# Patient Record
Sex: FEMALE | Race: WHITE | Employment: OTHER | URBAN - METROPOLITAN AREA
[De-identification: names, ages, dates, MRNs, and addresses within clinical notes are randomized per-mention and may not be internally consistent; named-entity substitution may affect disease eponyms.]

---

## 2018-01-25 ENCOUNTER — TELEPHONE (OUTPATIENT)
Dept: GASTROENTEROLOGY | Facility: CLINIC | Age: 38
End: 2018-01-25

## 2018-04-10 ENCOUNTER — TELEPHONE (OUTPATIENT)
Dept: GASTROENTEROLOGY | Facility: CLINIC | Age: 38
End: 2018-04-10

## 2018-04-11 ENCOUNTER — OFFICE VISIT (OUTPATIENT)
Dept: GASTROENTEROLOGY | Facility: CLINIC | Age: 38
End: 2018-04-11
Payer: MEDICARE

## 2018-04-11 VITALS
WEIGHT: 203.2 LBS | TEMPERATURE: 97.7 F | HEIGHT: 64 IN | BODY MASS INDEX: 34.69 KG/M2 | HEART RATE: 90 BPM | DIASTOLIC BLOOD PRESSURE: 76 MMHG | SYSTOLIC BLOOD PRESSURE: 116 MMHG | OXYGEN SATURATION: 97 %

## 2018-04-11 DIAGNOSIS — E03.9 HYPOTHYROIDISM, UNSPECIFIED TYPE: Primary | ICD-10-CM

## 2018-04-11 DIAGNOSIS — E03.9 HYPOTHYROIDISM, UNSPECIFIED TYPE: ICD-10-CM

## 2018-04-11 DIAGNOSIS — K59.03 DRUG-INDUCED CONSTIPATION: ICD-10-CM

## 2018-04-11 LAB
T4 FREE SERPL-MCNC: 1.29 NG/DL (ref 0.76–1.46)
TSH SERPL DL<=0.005 MIU/L-ACNC: 0.23 MU/L (ref 0.4–4)

## 2018-04-11 RX ORDER — POLYETHYLENE GLYCOL 3350 17 G/17G
1 POWDER, FOR SOLUTION ORAL DAILY
Qty: 510 G | Refills: 1 | Status: SHIPPED | OUTPATIENT
Start: 2018-04-11 | End: 2018-07-23

## 2018-04-11 ASSESSMENT — PAIN SCALES - GENERAL: PAINLEVEL: NO PAIN (0)
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
## 2018-04-11 NOTE — PATIENT INSTRUCTIONS
It was a pleasure taking care of you today.  I've included a brief summary of our discussion and care plan from today's visit below.  Please review this information with your primary care provider.  ______________________________________________________________________    My recommendations are summarized as follows:    -- Recommend a trial of Miralax.  This is not a stimulant laxative and is safe to take on a daily basis.  Try 3 cap(s) every day.  You can titrate this dose (increase or decrease) based on stool frequency and consistency. Do not stop this, just decrease the dose if you are experiencing loose stools.  -- Recommend soluble fiber supplementation on a daily basis (Metamucil, citrucel or benefiber).  Start with 1 tablespoon per day and if tolerated, may increase up to 2-3 tablespoons per day.  You may experience some bloating with initiation of fiber supplementation that will improve over the first month.  A good fiber trial to evaluate the effect is 3-6 months.      Return to GI Clinic in 3 months to review your progress.    ______________________________________________________________________    Who do I call with any questions after my visit?  Please be in touch if there are any further questions that arise following today's visit.  There are multiple ways to contact your gastroenterology care team.        During business hours, you may reach a Gastroenterology nurse at [phone], option 3.       To schedule or reschedule an appointment, please call [phone].       You can always send a secure message through [employer].  [employer] messages are answered by your nurse or doctor typically within 24 hours.  Please allow extra time on weekends and holidays.        For urgent/emergent questions after business hours, you may reach the on-call GI Fellow by contacting the [facility] at [phone].     How will I get the results of any tests ordered?    You will receive all of your  results.  If you have signed up for [employer], any tests ordered at your visit will be available to you after your physician reviews them.  Typically this takes 1-2 weeks.  If there are urgent results that require a change in your care plan, your physician or nurse will call you to discuss the next steps.      What is Privcaphart?  [employer] is a secure way for you to access all of your healthcare records from the [facility].  It is a web based computer program, so you can sign on to it from any location.  It also allows you to send secure messages to your care team.  I recommend signing up for [employer] access if you have not already done so and are comfortable with using a computer.      How to I schedule a follow-up visit?  If you did not schedule a follow-up visit today, please call [phone] to schedule a follow-up office visit.        Sincerely,    Aubrey Holden PA-C  [facility]  Division of Gastroenterology

## 2018-04-11 NOTE — MR AVS SNAPSHOT
After Visit Summary   4/11/2018    Jonathon Broussard    MRN: [id number]           Patient Information     Date Of Birth          1980        Visit Information        Provider Department      4/11/2018 12:00 PM Aubrey Holden PA-C [facility]        Today's Diagnoses     Hypothyroidism, unspecified type    -  1    Drug-induced constipation          Care Instructions    It was a pleasure taking care of you today.  I've included a brief summary of our discussion and care plan from today's visit below.  Please review this information with your primary care provider.  ______________________________________________________________________    My recommendations are summarized as follows:    -- Recommend a trial of Miralax.  This is not a stimulant laxative and is safe to take on a daily basis.  Try 3 cap(s) every day.  You can titrate this dose (increase or decrease) based on stool frequency and consistency. Do not stop this, just decrease the dose if you are experiencing loose stools.  -- Recommend soluble fiber supplementation on a daily basis (Metamucil, citrucel or benefiber).  Start with 1 tablespoon per day and if tolerated, may increase up to 2-3 tablespoons per day.  You may experience some bloating with initiation of fiber supplementation that will improve over the first month.  A good fiber trial to evaluate the effect is 3-6 months.      Return to GI Clinic in 3 months to review your progress.    ______________________________________________________________________    Who do I call with any questions after my visit?  Please be in touch if there are any further questions that arise following today's visit.  There are multiple ways to contact your gastroenterology care team.        During business hours, you may reach a Gastroenterology nurse at [phone], option 3.       To schedule or reschedule an appointment, please call [phone].       You can always  send a secure message through [employer].  [employer] messages are answered by your nurse or doctor typically within 24 hours.  Please allow extra time on weekends and holidays.        For urgent/emergent questions after business hours, you may reach the on-call GI Fellow by contacting the [facility]  at [phone].     How will I get the results of any tests ordered?    You will receive all of your results.  If you have signed up for [employer], any tests ordered at your visit will be available to you after your physician reviews them.  Typically this takes 1-2 weeks.  If there are urgent results that require a change in your care plan, your physician or nurse will call you to discuss the next steps.      What is [employer]?  [employer] is a secure way for you to access all of your healthcare records from the [facility].  It is a web based computer program, so you can sign on to it from any location.  It also allows you to send secure messages to your care team.  I recommend signing up for [employer] access if you have not already done so and are comfortable with using a computer.      How to I schedule a follow-up visit?  If you did not schedule a follow-up visit today, please call [phone] to schedule a follow-up office visit.        Sincerely,    Aubrey Holden PA-C  [facility]  Division of Gastroenterology                Follow-ups after your visit        Future tests that were ordered for you today     Open Future Orders        Priority Expected Expires Ordered    TSH with free T4 reflex Routine 4/11/2018 5/11/2018 4/11/2018            Who to contact     Please call your clinic at [phone] to:    Ask questions about your health    Make or cancel appointments    Discuss your medicines    Learn about your test results    Speak to your doctor            Additional Information About Your Visit        [employer] Information     [employer] is an electronic gateway that provides easy,  "online access to your medical records. With [employer], you can request a clinic appointment, read your test results, renew a prescription or communicate with your care team.     To sign up for [employer] visit the website at [email or web address]   You will be asked to enter the access code listed below, as well as some personal information. Please follow the directions to create your username and password.     Your access code is: [email or web address]  Expires: 4/15/2018  7:30 AM     Your access code will  in 90 days. If you need help or a new code, please contact your [facility] or call [phone] for assistance.        Care EveryWhere ID     This is your Care EveryWhere ID. This could be used by other organizations to access your [facility] medical records  [id number]        Your Vitals Were     Pulse Temperature Height Pulse Oximetry BMI (Body Mass Index)       90 97.7  F (36.5  C) 1.626 m (5' 4\") 97% 34.88 kg/m2        Blood Pressure from Last 3 Encounters:   18 116/76   06/05/15 112/81   06/02/15 132/81    Weight from Last 3 Encounters:   18 92.2 kg (203 lb 3.2 oz)   06/05/15 132.2 kg (291 lb 7.2 oz)   06/02/15 133.1 kg (293 lb 6.4 oz)                 Today's Medication Changes          These changes are accurate as of 18 12:32 PM.  If you have any questions, ask your nurse or doctor.               Start taking these medicines.        Dose/Directions    polyethylene glycol powder   Commonly known as:  MIRALAX   Used for:  Drug-induced constipation   Started by:  Aubrey Holden PA-C        Dose:  1 capful   Take 17 g (1 capful) by mouth daily   Quantity:  510 g   Refills:  1            Where to get your medicines      These medications were sent to [facility] - [place], [place] - [address]  [address], [place] [address]     Phone:  [phone]     polyethylene glycol powder                Primary Care Provider " Office Phone # Fax #    Eugenio Renny Levy PA-C [phone]       [facility] [address]  [place] [address]        Equal Access to Services     TYSON EUGENE : Hadii nathalie ku jareno Sojordiali, waaxda luqadaha, qaybta kaalmada adeegyada, tish alanizn josémichelle mendez viry mcdonald. So [facility] [phone].    ATENCIÓN: Si habla español, tiene a guevara disposición servicios gratuitos de asistencia lingüística. Llame al [phone].    We comply with applicable federal civil rights laws and [place] laws. We do not discriminate on the basis of race, color, national origin, age, disability, sex, sexual orientation, or gender identity.            Thank you!     Thank you for choosing [facility]  for your care. Our goal is always to provide you with excellent care. Hearing back from our patients is one way we can continue to improve our services. Please take a few minutes to complete the written survey that you may receive in the mail after your visit with us. Thank you!             Your Updated Medication List - Protect others around you: Learn how to safely use, store and throw away your medicines at www.disposemymeds.org.          This list is accurate as of 4/11/18 12:32 PM.  Always use your most recent med list.                   Brand Name Dispense Instructions for use Diagnosis    acetaminophen 500 MG tablet    TYLENOL     2 tablets po TID PRN        AMPHETAMINE SULFATE PO      Take 50 mg by mouth daily        BUSPIRONE HCL PO      Take 30 mg by mouth daily        CERTA-KONSTANTIN PO           DOXEPIN HCL PO      Take 25 mg by mouth        FLEXERIL 10 MG tablet   Generic drug:  cyclobenzaprine     20    ONE 3 TIMES DAILY prn muscle spasm, no driving or drinking on this medication    Arthritis chronic, rheumatoid, juvenile       GABAPENTIN PO      Take 1,200 mg by mouth 2 times daily Two tablets twice daily        INVEGA PO      Take 9 mg by mouth         LEVOTHYROXINE SODIUM PO      Take 112 mcg by mouth        LITHIUM CARBONATE PO      Take 1,200 mg by mouth At Bedtime        NALTREXONE HCL PO      Take 50 mg by mouth        omega 3 1000 MG Caps      Take 2 capsules by mouth 2 times daily        PANTOPRAZOLE SODIUM PO      Take 40 mg by mouth every morning (before breakfast)        polyethylene glycol powder    MIRALAX    510 g    Take 17 g (1 capful) by mouth daily    Drug-induced constipation       PROPRANOLOL HCL PO      Take 80 mg by mouth 2 times daily        VALTREX 500 MG tablet   Generic drug:  valACYclovir     60    2 TABLETS DAILY    Herpes simplex without mention of complication       VENLAFAXINE HCL PO      Take 375 mg by mouth 2 times daily ER        VITAMIN D (CHOLECALCIFEROL) PO      Take 2,000 Units by mouth daily
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
## 2018-04-11 NOTE — NURSING NOTE
"No chief complaint on file.      Vitals:    04/11/18 1206   BP: 116/76   BP Location: Left arm   Patient Position: Chair   Cuff Size: Adult Regular   Pulse: 90   Temp: 97.7  F (36.5  C)   SpO2: 97%   Weight: 203 lb 3.2 oz   Height: 5' 4\"       Body mass index is 34.88 kg/(m^2).    Angela Romero                          "

## 2018-04-12 ENCOUNTER — TELEPHONE (OUTPATIENT)
Dept: GASTROENTEROLOGY | Facility: CLINIC | Age: 38
End: 2018-04-12

## 2018-04-12 RX ORDER — POLYETHYLENE GLYCOL 3350 17 G/17G
1 POWDER, FOR SOLUTION ORAL 3 TIMES DAILY
Qty: 510 G | Refills: 1 | Status: SHIPPED | OUTPATIENT
Start: 2018-04-12 | End: 2018-12-05 | Stop reason: ALTCHOICE

## 2018-04-12 NOTE — TELEPHONE ENCOUNTER
Nurse calling from [facility] in [place]. State Rx for Miralax was sent for once daily, but pt is saying needed TID. Confirmed that KRAIG Casillas intended for Rx to be TID per her note. Nurse states he cannot take verbal order and will need a new Rx sent for TID. Yoselyn WILSON LPN

## 2018-07-17 ENCOUNTER — TELEPHONE (OUTPATIENT)
Dept: GASTROENTEROLOGY | Facility: CLINIC | Age: 38
End: 2018-07-17

## 2018-07-18 ENCOUNTER — OFFICE VISIT (OUTPATIENT)
Dept: GASTROENTEROLOGY | Facility: CLINIC | Age: 38
End: 2018-07-18
Payer: MEDICARE

## 2018-07-18 VITALS
SYSTOLIC BLOOD PRESSURE: 112 MMHG | BODY MASS INDEX: 33.59 KG/M2 | HEIGHT: 65 IN | OXYGEN SATURATION: 95 % | WEIGHT: 201.6 LBS | TEMPERATURE: 97.6 F | RESPIRATION RATE: 16 BRPM | DIASTOLIC BLOOD PRESSURE: 86 MMHG | HEART RATE: 99 BPM

## 2018-07-18 DIAGNOSIS — K59.03 DRUG-INDUCED CONSTIPATION: Primary | ICD-10-CM

## 2018-07-18 RX ORDER — OXYBUTYNIN CHLORIDE 10 MG/1
TABLET, EXTENDED RELEASE ORAL
Refills: 99 | COMMUNITY
Start: 2018-07-06

## 2018-07-18 RX ORDER — TRAZODONE HYDROCHLORIDE 50 MG/1
TABLET, FILM COATED ORAL
Refills: 4 | COMMUNITY
Start: 2018-07-03 | End: 2020-02-05

## 2018-07-18 RX ORDER — ATOMOXETINE 100 MG/1
CAPSULE ORAL
COMMUNITY
Start: 2018-04-12

## 2018-07-18 RX ORDER — NICOTINE POLACRILEX 2 MG/1
GUM, CHEWING ORAL
Refills: 16 | COMMUNITY
Start: 2018-07-17

## 2018-07-18 RX ORDER — CLOZAPINE 100 MG/1
TABLET ORAL
COMMUNITY
Start: 2018-07-05 | End: 2019-03-05

## 2018-07-18 RX ORDER — LACTOBACILLUS ACIDOPHILUS
CAPSULE ORAL
Refills: 11 | COMMUNITY
Start: 2018-06-27 | End: 2019-03-05

## 2018-07-18 RX ORDER — DIPHENHYDRAMINE HCL 50 MG/1
CAPSULE ORAL
Refills: 4 | COMMUNITY
Start: 2018-04-12 | End: 2020-02-05

## 2018-07-18 RX ORDER — CARVEDILOL 12.5 MG/1
TABLET ORAL
Refills: 11 | COMMUNITY
Start: 2018-07-17

## 2018-07-18 RX ORDER — CARBOXYMETHYLCELLULOSE SODIUM 5 MG/ML
1 SOLUTION/ DROPS OPHTHALMIC
COMMUNITY
Start: 2018-04-26 | End: 2020-02-05

## 2018-07-18 RX ORDER — DIAZEPAM 10 MG
TABLET ORAL
Refills: 4 | COMMUNITY
Start: 2018-07-11

## 2018-07-18 RX ORDER — OLOPATADINE HYDROCHLORIDE 1 MG/ML
SOLUTION/ DROPS OPHTHALMIC
Refills: 5 | COMMUNITY
Start: 2018-07-09

## 2018-07-18 RX ORDER — LISINOPRIL 5 MG/1
TABLET ORAL
Refills: 11 | COMMUNITY
Start: 2018-06-27

## 2018-07-18 RX ORDER — CLOZAPINE 100 MG/1
TABLET ORAL
COMMUNITY
Start: 2018-04-12

## 2018-07-18 RX ORDER — DOCUSATE SODIUM 100 MG/1
CAPSULE, LIQUID FILLED ORAL
Refills: 11 | COMMUNITY
Start: 2018-04-03 | End: 2020-02-05

## 2018-07-18 RX ORDER — LORAZEPAM 2 MG/1
TABLET ORAL
Refills: 4 | COMMUNITY
Start: 2018-01-30

## 2018-07-18 RX ORDER — AMLODIPINE BESYLATE 10 MG/1
TABLET ORAL
Refills: 11 | COMMUNITY
Start: 2018-07-17

## 2018-07-18 RX ORDER — CARBOXYMETHYLCELLULOSE SODIUM 0.5 G/100ML
SOLUTION/ DROPS OPHTHALMIC
Refills: 11 | COMMUNITY
Start: 2018-07-09

## 2018-07-18 RX ORDER — QUETIAPINE FUMARATE 25 MG/1
TABLET, FILM COATED ORAL
Refills: 4 | COMMUNITY
Start: 2018-07-06 | End: 2020-02-05

## 2018-07-18 RX ORDER — BENZTROPINE MESYLATE 0.5 MG/1
0.5 TABLET ORAL
COMMUNITY
Start: 2018-04-12 | End: 2019-03-05

## 2018-07-18 RX ORDER — ATOMOXETINE 25 MG/1
CAPSULE ORAL
COMMUNITY
Start: 2018-04-12

## 2018-07-18 ASSESSMENT — PAIN SCALES - GENERAL: PAINLEVEL: NO PAIN (0)

## 2018-07-18 NOTE — PATIENT INSTRUCTIONS
It was a pleasure taking care of you today.  I've included a brief summary of our discussion and care plan from today's visit below.  Please review this information with your primary care provider.  ______________________________________________________________________    My recommendations are summarized as follows:    --  stool kit today and submit sample as soon as possible  --  ONE Miralax bottle (510 g) and TWO 32 once Gatorade (64 ounces). Mix the two. Drink mixture over the course of 3-4 hours.  You should experience loose bowel movements that are watery in consistency when complete.  -- The day after the cleanout, immediately resume Miralax 2 caps full daily   -- This is not a stimulant laxative and is safe to take on a daily basis.  You can titrate this dose (increase or decrease) based on stool frequency and consistency.     -- Once you have moved to twice daily dosing of Miralax then add in the soluble fiber supplementation.  -- Recommend soluble fiber supplementation on a daily basis (Metamucil, citrucel or benefiber).  Start with 1 tablespoon per day and if tolerated, may increase up to 2-3 tablespoons per day.  You may experience some bloating with initiation of fiber supplementation that will improve over the first month.  A good fiber trial to evaluate the effect is 3-6 months.    -- Can stop Colace (continue daily Miralax)  -- Recommend your primary adjust your synthroid (thyroid medication)    Return to GI Clinic in 3 months to review your progress.    ______________________________________________________________________    Who do I call with any questions after my visit?  Please be in touch if there are any further questions that arise following today's visit.  There are multiple ways to contact your gastroenterology care team.        During business hours, you may reach a Gastroenterology nurse at [phone], option 3.       To schedule or reschedule an appointment, please call  [phone].       You can always send a secure message through codebender.  codebender messages are answered by your nurse or doctor typically within 24 hours.  Please allow extra time on weekends and holidays.        For urgent/emergent questions after business hours, you may reach the on-call GI Fellow by contacting the [facility]  at [phone].     How will I get the results of any tests ordered?    You will receive all of your results.  If you have signed up for [facility], any tests ordered at your visit will be available to you after your physician reviews them.  Typically this takes 1-2 weeks.  If there are urgent results that require a change in your care plan, your physician or nurse will call you to discuss the next steps.      What is codebender?  codebender is a secure way for you to access all of your healthcare records from the [facility].  It is a web based computer program, so you can sign on to it from any location.  It also allows you to send secure messages to your care team.  I recommend signing up for codebender access if you have not already done so and are comfortable with using a computer.      How to I schedule a follow-up visit?  If you did not schedule a follow-up visit today, please call [phone] to schedule a follow-up office visit.        Sincerely,    Aubrey Holden PA-C  [facility]  Division of Gastroenterology
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
## 2018-07-18 NOTE — NURSING NOTE
"Chief Complaint   Patient presents with     Gastrointestinal Problem     Abdominal pain and Diarrhea       Vitals:    07/18/18 1138   BP: 112/86   BP Location: Left arm   Patient Position: Sitting   Cuff Size: Adult Large   Pulse: 99   Resp: 16   Temp: 97.6  F (36.4  C)   TempSrc: Oral   SpO2: 95%   Weight: 91.4 kg (201 lb 9.6 oz)   Height: 1.65 m (5' 4.96\")       Body mass index is 33.59 kg/(m^2).      Shirley Marcum LPN                          "
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
## 2018-07-18 NOTE — MR AVS SNAPSHOT
After Visit Summary   7/18/2018    Jonathon Broussard    MRN: [id number]           Patient Information     Date Of Birth          1980        Visit Information        Provider Department      7/18/2018 12:20 PM Aubrey Holden PA-C [facility]        Care Instructions    It was a pleasure taking care of you today.  I've included a brief summary of our discussion and care plan from today's visit below.  Please review this information with your primary care provider.  ______________________________________________________________________    My recommendations are summarized as follows:    --  stool kit today and submit sample as soon as possible  --  ONE Miralax bottle (510 g) and TWO 32 once Gatorade (64 ounces). Mix the two. Drink mixture over the course of 3-4 hours.  You should experience loose bowel movements that are watery in consistency when complete.  -- The day after the cleanout, immediately resume Miralax 2 caps full daily   -- This is not a stimulant laxative and is safe to take on a daily basis.  You can titrate this dose (increase or decrease) based on stool frequency and consistency.     -- Once you have moved to twice daily dosing of Miralax then add in the soluble fiber supplementation.  -- Recommend soluble fiber supplementation on a daily basis (Metamucil, citrucel or benefiber).  Start with 1 tablespoon per day and if tolerated, may increase up to 2-3 tablespoons per day.  You may experience some bloating with initiation of fiber supplementation that will improve over the first month.  A good fiber trial to evaluate the effect is 3-6 months.    -- Can stop Colace (continue daily Miralax)  -- Recommend your primary adjust your synthroid (thyroid medication)    Return to GI Clinic in 3 months to review your progress.    ______________________________________________________________________    Who do I call with any questions after my  visit?  Please be in touch if there are any further questions that arise following today's visit.  There are multiple ways to contact your gastroenterology care team.        During business hours, you may reach a Gastroenterology nurse at [phone], option 3.       To schedule or reschedule an appointment, please call [phone].       You can always send a secure message through [employer].  [employer] messages are answered by your nurse or doctor typically within 24 hours.  Please allow extra time on weekends and holidays.        For urgent/emergent questions after business hours, you may reach the on-call GI Fellow by contacting the [facility]  at [phone].     How will I get the results of any tests ordered?    You will receive all of your results.  If you have signed up for [email or web address], any tests ordered at your visit will be available to you after your physician reviews them.  Typically this takes 1-2 weeks.  If there are urgent results that require a change in your care plan, your physician or nurse will call you to discuss the next steps.      What is [employer]?  [employer] is a secure way for you to access all of your healthcare records from the [facility].  It is a web based computer program, so you can sign on to it from any location.  It also allows you to send secure messages to your care team.  I recommend signing up for [employer] access if you have not already done so and are comfortable with using a computer.      How to I schedule a follow-up visit?  If you did not schedule a follow-up visit today, please call [phone] to schedule a follow-up office visit.        Sincerely,    Aubrey Holden PA-C  [facility]  Division of Gastroenterology                Follow-ups after your visit        Follow-up notes from your care team     Return in about 3 months (around 10/18/2018).      Who to contact     Please call your clinic at [phone] to:    Ask questions about  "your health    Make or cancel appointments    Discuss your medicines    Learn about your test results    Speak to your doctor            Additional Information About Your Visit        Care EveryWhere ID     This is your Care EveryWhere ID. This could be used by other organizations to access your [facility] medical records  [id number]        Your Vitals Were     Pulse Temperature Respirations Height Pulse Oximetry BMI (Body Mass Index)    99 97.6  F (36.4  C) (Oral) 16 1.65 m (5' 4.96\") 95% 33.59 kg/m2       Blood Pressure from Last 3 Encounters:   07/18/18 112/86   04/11/18 116/76   06/05/15 112/81    Weight from Last 3 Encounters:   07/18/18 91.4 kg (201 lb 9.6 oz)   04/11/18 92.2 kg (203 lb 3.2 oz)   06/05/15 132.2 kg (291 lb 7.2 oz)              Today, you had the following     No orders found for display       Primary Care Provider    None Specified       No primary provider on file.        Equal Access to Services     [facility]: Hadii nathalie Herrera, wapriyada kaya, qaybta kaalmada barbara, tish baires . So [facility] [phone].    ATENCIÓN: Si habla español, tiene a guevara disposición servicios gratuitos de asistencia lingüística. Llame al [phone].    We comply with applicable federal civil rights laws and [place] laws. We do not discriminate on the basis of race, color, national origin, age, disability, sex, sexual orientation, or gender identity.            Thank you!     Thank you for choosing [facility]  for your care. Our goal is always to provide you with excellent care. Hearing back from our patients is one way we can continue to improve our services. Please take a few minutes to complete the written survey that you may receive in the mail after your visit with us. Thank you!             Your Updated Medication List - Protect others around you: Learn how to safely use, store and throw away your medicines at www.disposemymeds.org. "          This list is accurate as of 7/18/18 12:36 PM.  Always use your most recent med list.                   Brand Name Dispense Instructions for use Diagnosis    acetaminophen 500 MG tablet    TYLENOL     2 tablets po TID PRN        amLODIPine 10 MG tablet    NORVASC     TAKE 1 TABLET BY MOUTH EVERY DAY DX HIGH BLOOD PRESSURE        AMPHETAMINE SULFATE PO      Take 50 mg by mouth daily        * atomoxetine 100 MG capsule    STRATTERA     One cap QAM (with one 25mg cap for total of 125mg QAM). Take after food.        * atomoxetine 25 MG capsule    STRATTERA     One cap QAM (with one 100mg cap for total of 125mg QAM). Take after food.        benztropine 0.5 MG tablet    COGENTIN     Take 0.5 mg by mouth        BUSPIRONE HCL PO      Take 30 mg by mouth daily        carvedilol 12.5 MG tablet    COREG     TAKE 3 TABLETS (37.5mg) BY MOUTH TWICE DAILY DX HIGH BLOOD PRESSURE        CERTA-KONSTANTIN PO           * cloZAPine 100 MG tablet    CLOZARIL     400mg QHS. (4 tabs QHS)        * cloZAPine 100 MG tablet    CLOZARIL          diazepam 10 MG tablet    VALIUM     TAKE 1 TAB BY MOUTH THREE TIMES DAILY.IF TOO SEDATING ,CAN TAKE 1 2 TAB (5MG)        * diphenhydrAMINE HCl 50 MG Tabs      One tab QID prn.        * BANOPHEN 50 MG capsule   Generic drug:  diphenhydrAMINE      TAKE 1 CAPSULE BY MOUTH FOUR TIMES DAILY AS NEEDED         MG capsule   Generic drug:  docusate sodium      TAKE 1 CAP BY MOUTH ONCE DAILY        DOXEPIN HCL PO      Take 25 mg by mouth        FLEXERIL 10 MG tablet   Generic drug:  cyclobenzaprine     20    ONE 3 TIMES DAILY prn muscle spasm, no driving or drinking on this medication    Arthritis chronic, rheumatoid, juvenile       FREEZE DRIED ACIDOPHILUS Caps      TAKE 1 CAP BY MOUTH ONCE DAILY        GABAPENTIN PO      Take 1,200 mg by mouth 2 times daily Two tablets twice daily        INVEGA PO      Take 9 mg by mouth        LEVOTHYROXINE SODIUM PO      Take 112 mcg by mouth        lisinopril 5 MG  tablet    PRINIVIL/ZESTRIL     TAKE 1 TAB BY MOUTH ONCE DAILY        LITHIUM CARBONATE PO      Take 1,200 mg by mouth At Bedtime        LORazepam 2 MG tablet    ATIVAN     TAKE 1 TAB BY MOUTH THREE TIMES DAILY        * carboxymethylcellulose 0.5 % Soln ophthalmic solution    REFRESH PLUS     1 drop        * LUBRICATING PLUS EYE DROPS 0.5 % Soln ophthalmic solution   Generic drug:  Carboxymethylcellulose Sod PF      INSTILL 1 DROP INTO EACH EYE FOUR TIMES DAILY        NALTREXONE HCL PO      Take 50 mg by mouth        olopatadine 0.1 % ophthalmic solution    PATANOL     INSTILL 1 DROP TO BOTH EYES TWICE A DAY        omega 3 1000 MG Caps      Take 2 capsules by mouth 2 times daily        oxybutynin 10 MG 24 hr tablet    DITROPAN-XL     TAKE 1 TAB BY MOUTH ONCE DAILY        paliperidone 234 MG/1.5ML Susp    INVEGA SUSTENNA     Inject 234 mg into the muscle        PANTOPRAZOLE SODIUM PO      Take 40 mg by mouth every morning (before breakfast)        * polyethylene glycol powder    MIRALAX    510 g    Take 17 g (1 capful) by mouth daily    Drug-induced constipation       * polyethylene glycol powder    MIRALAX    510 g    Take 17 g (1 capful) by mouth 3 times daily    Drug-induced constipation, Hypothyroidism, unspecified type       PROPRANOLOL HCL PO      Take 80 mg by mouth 2 times daily        QUEtiapine 25 MG tablet    SEROquel     TAKE 1 TAB BY MOUTH AT BEDTIME        SM NICOTINE POLACRILEX 2 MG gum   Generic drug:  nicotine polacrilex      CHEW 1 PIECE EVERY HOUR AS NEEDED FOR NICOTINE CRAVINGS        traZODone 50 MG tablet    DESYREL     TAKE 1 2 TAB (25MG) BY MOUTH AT BEDTIME        VALTREX 500 MG tablet   Generic drug:  valACYclovir     60    2 TABLETS DAILY    Herpes simplex without mention of complication       VENLAFAXINE HCL PO      Take 375 mg by mouth 2 times daily ER        VITAMIN D (CHOLECALCIFEROL) PO      Take 2,000 Units by mouth daily        * Notice:  This list has 10 medication(s) that are the  same as other medications prescribed for you. Read the directions carefully, and ask your doctor or other care provider to review them with you.

## 2018-07-20 ENCOUNTER — TELEPHONE (OUTPATIENT)
Dept: GASTROENTEROLOGY | Facility: CLINIC | Age: 38
End: 2018-07-20

## 2018-07-20 NOTE — TELEPHONE ENCOUNTER
[facility] Call Center    Phone Message    May a detailed message be left on voicemail: yes    Reason for Call: Dariusz @ [facility] called about instructions on AVS as a certain part was changed by provider and now says  ONE Miralax bottle (510 g) and TWO 32 once Gatorade (64 ounces). Mix the two. Drink mixture.   IT NOW SAYS PER DARIUSZ GET TWO BOTTLES ONE BOTTLE TO MIX AND ANOTHER ONE TO CONTINUE TO TAKE     Action Taken: Message routed to:  [facility] BREANNA

## 2018-07-23 DIAGNOSIS — R19.7 DIARRHEA, UNSPECIFIED TYPE: Primary | ICD-10-CM

## 2018-07-23 DIAGNOSIS — R19.7 DIARRHEA, UNSPECIFIED TYPE: ICD-10-CM

## 2018-07-23 DIAGNOSIS — K59.03 DRUG-INDUCED CONSTIPATION: ICD-10-CM

## 2018-07-23 LAB
C DIFF TOX B STL QL: POSITIVE
SPECIMEN SOURCE: ABNORMAL

## 2018-07-23 PROCEDURE — 87493 C DIFF AMPLIFIED PROBE: CPT | Performed by: PHYSICIAN ASSISTANT

## 2018-07-23 RX ORDER — POLYETHYLENE GLYCOL 3350 17 G/17G
1 POWDER, FOR SOLUTION ORAL DAILY
Qty: 510 G | Refills: 1 | Status: SHIPPED | OUTPATIENT
Start: 2018-07-23 | End: 2020-02-05

## 2018-07-25 ENCOUNTER — TELEPHONE (OUTPATIENT)
Dept: GASTROENTEROLOGY | Facility: CLINIC | Age: 38
End: 2018-07-25

## 2018-07-25 NOTE — TELEPHONE ENCOUNTER
Called [facility]ia [facility] to clarify vancomycin.  Pt was to take 4 times a day for 2  Weeks. Wrong quantity on rx.  Called [facility] to correct the quantity.  Order given to [facility] pharmacist.

## 2018-07-25 NOTE — TELEPHONE ENCOUNTER
[facility] Call Center    Phone Message    May a detailed message be left on voicemail: yes    Reason for Call: Other: Please follow up with Maia to verify the quanity of polyethylene glycol (MIRALAX) powder.     Action Taken: Message routed to:  [facility] ([facility]): armen gipson

## 2018-08-07 ENCOUNTER — TELEPHONE (OUTPATIENT)
Dept: GASTROENTEROLOGY | Facility: CLINIC | Age: 38
End: 2018-08-07

## 2018-08-07 NOTE — TELEPHONE ENCOUNTER
Called out to [facility] to speak with Dariusz, spoke with other nurse Maia also working with patient. Advised if patient still having loose stools, would then retest stool. Maia advised patient is still completing vancomycin dose and will call back if patient continues to have loose stools once abx is completed.

## 2018-08-27 ENCOUNTER — TELEPHONE (OUTPATIENT)
Dept: GASTROENTEROLOGY | Facility: CLINIC | Age: 38
End: 2018-08-27

## 2018-08-27 NOTE — TELEPHONE ENCOUNTER
[facility] Call Center    Phone Message    May a detailed message be left on voicemail: yes    Reason for Call: Other: Maia from pts assisted living called as pt would like another stool sample kit mailed to her, to see if the c-diff is gone.  Please mail kit to pt.   Follow up with nurse line at [phone], to verify if a stool kit will be sent to pt, as they will drop off the stool sample to [facility] [place] [place] lab.  Thank you!     Action Taken: Other: [facility]

## 2018-08-29 NOTE — TELEPHONE ENCOUNTER
Called and spoke to the nurse on call at the patient home.  Patient is reporting having 1-2 soft/ loose stools per day and wanting to know if she needs to have another c-diff test to determine if the c-diff is gone. IT was reported top the nurse that at this time another stool sample is not warranted, but if she does increase her stools to always diarrhea and 5-6 a day she should call back into the clinic. Patient nursing staffverbalizes understanding of plan and will relay the message to the patient.

## 2018-10-17 ENCOUNTER — TELEPHONE (OUTPATIENT)
Dept: GASTROENTEROLOGY | Facility: CLINIC | Age: 38
End: 2018-10-17

## 2018-10-17 NOTE — TELEPHONE ENCOUNTER
Diana-nurse from residential treatment facility where patient currently resides called to provide this information, to clarify appointment and location, and to advise patient will be coming to appointment unless transportation unavailable. Advised to call back if needing to reschedule.

## 2018-10-19 ENCOUNTER — OFFICE VISIT (OUTPATIENT)
Dept: GASTROENTEROLOGY | Facility: CLINIC | Age: 38
End: 2018-10-19
Payer: MEDICARE

## 2018-10-19 VITALS
SYSTOLIC BLOOD PRESSURE: 106 MMHG | TEMPERATURE: 97.6 F | OXYGEN SATURATION: 96 % | BODY MASS INDEX: 32.11 KG/M2 | DIASTOLIC BLOOD PRESSURE: 70 MMHG | HEART RATE: 99 BPM | WEIGHT: 192.7 LBS

## 2018-10-19 DIAGNOSIS — R19.7 DIARRHEA, UNSPECIFIED TYPE: Primary | ICD-10-CM

## 2018-10-19 RX ORDER — HYDROXYZINE HYDROCHLORIDE 50 MG/1
TABLET, FILM COATED ORAL
Refills: 4 | COMMUNITY
Start: 2018-09-13 | End: 2020-02-05

## 2018-10-19 NOTE — NURSING NOTE
Chief Complaint   Patient presents with     RECHECK     3 months follow up       Vitals:    10/19/18 1302   BP: 106/70   Pulse: 99   Temp: 97.6  F (36.4  C)   TempSrc: Oral   SpO2: 96%   Weight: 87.4 kg (192 lb 11.2 oz)       Body mass index is 32.11 kg/(m^2).      NATASHA Altman
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
## 2018-10-19 NOTE — MR AVS SNAPSHOT
After Visit Summary   10/19/2018    Jonathon Broussard    MRN: [id number]           Patient Information     Date Of Birth          1980        Visit Information        Provider Department      10/19/2018 1:00 PM Aubrey Holden PA-C [facility]        Today's Diagnoses     Diarrhea, unspecified type    -  1      Care Instructions    It was a pleasure taking care of you today.  I've included a brief summary of our discussion and care plan from today's visit below.  Please review this information with your primary care provider.  ______________________________________________________________________    My recommendations are summarized as follows:    -- Try linzess 1 pill per day (145 mcg) for constipation  -- If this is not effective, then try to add Miralax back in (1-2 caps full per day)  -- If the above is not effective, call or message the clinic and we can increase dose of Linzess  -- Ensure stay hydrated!!    Return to GI Clinic in 4-6 weeks to review your progress.    ______________________________________________________________________    Who do I call with any questions after my visit?  Please be in touch if there are any further questions that arise following today's visit.  There are multiple ways to contact your gastroenterology care team.        During business hours, you may reach a Gastroenterology nurse at [phone], option 3.       To schedule or reschedule an appointment, please call [phone].       You can always send a secure message through Dr. Scribbles.  Dr. Scribbles messages are answered by your nurse or doctor typically within 24 hours.  Please allow extra time on weekends and holidays.        For urgent/emergent questions after business hours, you may reach the on-call GI Fellow by contacting the [facility] at [phone].      In order for your refill to be processed in a timely fashion, it is your responsibility to ensure  you follow the recommendations from your provider regarding your laboratory studies and follow up appointments.       How will I get the results of any tests ordered?    You will receive all of your results.  If you have signed up for [employer], any tests ordered at your visit will be available to you after your physician reviews them.  Typically this takes 1-2 weeks.  If there are urgent results that require a change in your care plan, your physician or nurse will call you to discuss the next steps.      What is [employer]?  The [employer] is a secure way for you to access all of your healthcare records from the [facility].  It is a web based computer program, so you can sign on to it from any location.  It also allows you to send secure messages to your care team.  I recommend signing up for [employer] access if you have not already done so and are comfortable with using a computer.      How to I schedule a follow-up visit?  If you did not schedule a follow-up visit today, please call [phone] to schedule a follow-up office visit.        Sincerely,    Aubrey Holden PA-C  [facility]  Division of Gastroenterology                Follow-ups after your visit        Follow-up notes from your care team     Return in about 4 weeks (around 11/16/2018).      Who to contact     Please call your clinic at [phone] to:    Ask questions about your health    Make or cancel appointments    Discuss your medicines    Learn about your test results    Speak to your doctor            Additional Information About Your Visit        Care EveryWhere ID     This is your Care EveryWhere ID. This could be used by other organizations to access your [place] medical records  [id number]        Your Vitals Were     Pulse Temperature Pulse Oximetry BMI (Body Mass Index)          99 97.6  F (36.4  C) (Oral) 96% 32.11 kg/m2         Blood Pressure from Last 3 Encounters:   10/19/18 106/70   07/18/18 112/86   04/11/18 116/76     Weight from Last 3 Encounters:   10/19/18 87.4 kg (192 lb 11.2 oz)   07/18/18 91.4 kg (201 lb 9.6 oz)   04/11/18 92.2 kg (203 lb 3.2 oz)              Today, you had the following     No orders found for display         Today's Medication Changes          These changes are accurate as of 10/19/18  1:43 PM.  If you have any questions, ask your nurse or doctor.               Start taking these medicines.        Dose/Directions    linaclotide 145 MCG capsule   Commonly known as:  LINZESS   Used for:  Diarrhea, unspecified type   Started by:  Aubrey Holden PA-C        Dose:  145 mcg   Take 1 capsule (145 mcg) by mouth every morning (before breakfast)   Quantity:  30 capsule   Refills:  1            Where to get your medicines      These medications were sent to [id number] - [place], [place] - [address]  [address], [facility] [address]     Phone:  [phone]     linaclotide 145 MCG capsule                Primary Care Provider    None Specified       No primary provider on file.        Equal Access to Services     [facility]: Hadii nathalie Herrera, waaxda luchandra, qaybta kaaldylon jimenez, tish baires . So [facility] [phone].    ATENCIÓN: Si habla español, tiene a guevara disposición servicios gratuitos de asistencia lingüística. [facility] [phone].    We comply with applicable federal civil rights laws and [place] laws. We do not discriminate on the basis of race, color, national origin, age, disability, sex, sexual orientation, or gender identity.            Thank you!     Thank you for choosing [facility]  for your care. Our goal is always to provide you with excellent care. Hearing back from our patients is one way we can continue to improve our services. Please take a few minutes to complete the written survey that you may receive in the mail after your visit with us. Thank you!             Your  Updated Medication List - Protect others around you: Learn how to safely use, store and throw away your medicines at www.disposemymeds.org.          This list is accurate as of 10/19/18  1:43 PM.  Always use your most recent med list.                   Brand Name Dispense Instructions for use Diagnosis    acetaminophen 500 MG tablet    TYLENOL     2 tablets po TID PRN        amLODIPine 10 MG tablet    NORVASC     TAKE 1 TABLET BY MOUTH EVERY DAY DX HIGH BLOOD PRESSURE        AMPHETAMINE SULFATE PO      Take 50 mg by mouth daily        * atomoxetine 100 MG capsule    STRATTERA     One cap QAM (with one 25mg cap for total of 125mg QAM). Take after food.        * atomoxetine 25 MG capsule    STRATTERA     One cap QAM (with one 100mg cap for total of 125mg QAM). Take after food.        benztropine 0.5 MG tablet    COGENTIN     Take 0.5 mg by mouth        BUSPIRONE HCL PO      Take 30 mg by mouth daily        carvedilol 12.5 MG tablet    COREG     TAKE 3 TABLETS (37.5mg) BY MOUTH TWICE DAILY DX HIGH BLOOD PRESSURE        CERTA-KONSTANTIN PO           * cloZAPine 100 MG tablet    CLOZARIL     400mg QHS. (4 tabs QHS)        * cloZAPine 100 MG tablet    CLOZARIL          diazepam 10 MG tablet    VALIUM     TAKE 1 TAB BY MOUTH THREE TIMES DAILY.IF TOO SEDATING ,CAN TAKE 1 2 TAB (5MG)        * diphenhydrAMINE HCl 50 MG Tabs      One tab QID prn.        * BANOPHEN 50 MG capsule   Generic drug:  diphenhydrAMINE      TAKE 1 CAPSULE BY MOUTH FOUR TIMES DAILY AS NEEDED         MG capsule   Generic drug:  docusate sodium      TAKE 1 CAP BY MOUTH ONCE DAILY        DOXEPIN HCL PO      Take 25 mg by mouth        FLEXERIL 10 MG tablet   Generic drug:  cyclobenzaprine     20    ONE 3 TIMES DAILY prn muscle spasm, no driving or drinking on this medication    Arthritis chronic, rheumatoid, juvenile       FREEZE DRIED ACIDOPHILUS Caps      TAKE 1 CAP BY MOUTH ONCE DAILY        GABAPENTIN PO      Take 1,200 mg by mouth 2 times daily Two  tablets twice daily        hydrOXYzine 50 MG tablet    ATARAX     TAKE 1 TABLET BY MOUTH EVERY 4 HOURS AS NEEDED FOR ANXIETY,RESTLESSNESS,STIFF- NESS        INVEGA PO      Take 9 mg by mouth        LEVOTHYROXINE SODIUM PO      Take 112 mcg by mouth        linaclotide 145 MCG capsule    LINZESS    30 capsule    Take 1 capsule (145 mcg) by mouth every morning (before breakfast)    Diarrhea, unspecified type       lisinopril 5 MG tablet    PRINIVIL/ZESTRIL     TAKE 1 TAB BY MOUTH ONCE DAILY        LITHIUM CARBONATE PO      Take 1,200 mg by mouth At Bedtime        LORazepam 2 MG tablet    ATIVAN     TAKE 1 TAB BY MOUTH THREE TIMES DAILY        * carboxymethylcellulose 0.5 % Soln ophthalmic solution    REFRESH PLUS     1 drop        * LUBRICATING PLUS EYE DROPS 0.5 % Soln ophthalmic solution   Generic drug:  Carboxymethylcellulose Sod PF      INSTILL 1 DROP INTO EACH EYE FOUR TIMES DAILY        NALTREXONE HCL PO      Take 50 mg by mouth        olopatadine 0.1 % ophthalmic solution    PATANOL     INSTILL 1 DROP TO BOTH EYES TWICE A DAY        omega 3 1000 MG Caps      Take 2 capsules by mouth 2 times daily        oxybutynin 10 MG 24 hr tablet    DITROPAN-XL     TAKE 1 TAB BY MOUTH ONCE DAILY        paliperidone 234 MG/1.5ML Susp    INVEGA SUSTENNA     Inject 234 mg into the muscle        PANTOPRAZOLE SODIUM PO      Take 40 mg by mouth every morning (before breakfast)        * polyethylene glycol powder    MIRALAX    510 g    Take 17 g (1 capful) by mouth 3 times daily    Drug-induced constipation, Hypothyroidism, unspecified type       * polyethylene glycol powder    MIRALAX    510 g    Take 17 g (1 capful) by mouth daily    Drug-induced constipation       PROPRANOLOL HCL PO      Take 80 mg by mouth 2 times daily        QUEtiapine 25 MG tablet    SEROquel     TAKE 1 TAB BY MOUTH AT BEDTIME        SM NICOTINE POLACRILEX 2 MG gum   Generic drug:  nicotine polacrilex      CHEW 1 PIECE EVERY HOUR AS NEEDED FOR NICOTINE  CRAVINGS        traZODone 50 MG tablet    DESYREL     TAKE 1 2 TAB (25MG) BY MOUTH AT BEDTIME        VALTREX 500 MG tablet   Generic drug:  valACYclovir     60    2 TABLETS DAILY    Herpes simplex without mention of complication       vancomycin 125 MG capsule    VANCOCIN    28 capsule    Take 1 capsule (125 mg) by mouth 4 times daily    Colitis due to Clostridium difficile       VENLAFAXINE HCL PO      Take 375 mg by mouth 2 times daily ER        VITAMIN D (CHOLECALCIFEROL) PO      Take 2,000 Units by mouth daily        * Notice:  This list has 10 medication(s) that are the same as other medications prescribed for you. Read the directions carefully, and ask your doctor or other care provider to review them with you.

## 2018-10-19 NOTE — PATIENT INSTRUCTIONS
It was a pleasure taking care of you today.  I've included a brief summary of our discussion and care plan from today's visit below.  Please review this information with your primary care provider.  ______________________________________________________________________    My recommendations are summarized as follows:    -- Try linzess 1 pill per day (145 mcg) for constipation  -- If this is not effective, then try to add Miralax back in (1-2 caps full per day)  -- If the above is not effective, call or message the clinic and we can increase dose of Linzess  -- Ensure stay hydrated!!    Return to GI Clinic in 4-6 weeks to review your progress.    ______________________________________________________________________    Who do I call with any questions after my visit?  Please be in touch if there are any further questions that arise following today's visit.  There are multiple ways to contact your gastroenterology care team.        During business hours, you may reach a Gastroenterology nurse at [phone], option [phone].       To schedule or reschedule an appointment, please call [phone].       You can always send a secure message through [employer].  [employer] messages are answered by your nurse or doctor typically within 24 hours.  Please allow extra time on weekends and holidays.        For urgent/emergent questions after business hours, you may reach the on-call GI Fellow by contacting the [facility] at [phone].      In order for your refill to be processed in a timely fashion, it is your responsibility to ensure you follow the recommendations from your provider regarding your laboratory studies and follow up appointments.       How will I get the results of any tests ordered?    You will receive all of your results.  If you have signed up for [employer], any tests ordered at your visit will be available to you after your physician reviews them.  Typically this takes 1-2 weeks.  If there are  urgent results that require a change in your care plan, your physician or nurse will call you to discuss the next steps.      What is [employer]?  [email or web address] is a secure way for you to access all of your healthcare records from the [facility].  It is a web based computer program, so you can sign on to it from any location.  It also allows you to send secure messages to your care team.  I recommend signing up for [email or web address] access if you have not already done so and are comfortable with using a computer.      How to I schedule a follow-up visit?  If you did not schedule a follow-up visit today, please call [phone] to schedule a follow-up office visit.        Sincerely,    Aubrey Holden PA-C  [facility]  Division of Gastroenterology

## 2018-11-05 ENCOUNTER — TELEPHONE (OUTPATIENT)
Dept: GASTROENTEROLOGY | Facility: CLINIC | Age: 38
End: 2018-11-05

## 2018-11-05 NOTE — TELEPHONE ENCOUNTER
Health Call Center    Phone Message    May a detailed message be left on voicemail: yes    Reason for Call: Other: Ulisses, the RN at the pt's assisted living, is calling about linaclotide (LINZESS) 145 MCG capsule. The pt has had severe diarrhea with the med, so they have held the RX for Sat-Sun-Mon, pending your recomendation. The pt reports she is slightly better. Please call Ulisses at [phone]. Thanks.     Action Taken: Message routed to:  [facility]

## 2018-11-06 DIAGNOSIS — K59.03 DRUG-INDUCED CONSTIPATION: Primary | ICD-10-CM

## 2018-11-06 DIAGNOSIS — R19.7 DIARRHEA, UNSPECIFIED TYPE: ICD-10-CM

## 2018-11-06 NOTE — PROGRESS NOTES
Patient had significant diarrhea with Linzess 145 mcg (previous to starting was having one stool per week).  Will plan to decrease dose to Linzess to 72 mcg.      Aubrey Holden PA-C  Division of Gastroenterology, Hepatology and Nutrition  [facility]

## 2018-11-06 NOTE — TELEPHONE ENCOUNTER
Called patient assisted living home and reported to them that Aubrey Holden would like to decrease the dose of her medication to 72 mcg. It was also suggested to the assissted living facility that if patient has not had a bowel movement in the last day she should have a dose of miralax until she can start on the medication. Saff of the facility verbalized understanding.

## 2018-11-29 ENCOUNTER — TELEPHONE (OUTPATIENT)
Dept: GASTROENTEROLOGY | Facility: CLINIC | Age: 38
End: 2018-11-29

## 2018-12-05 ENCOUNTER — OFFICE VISIT (OUTPATIENT)
Dept: GASTROENTEROLOGY | Facility: CLINIC | Age: 38
End: 2018-12-05
Payer: MEDICARE

## 2018-12-05 VITALS
HEART RATE: 95 BPM | WEIGHT: 181.2 LBS | BODY MASS INDEX: 30.19 KG/M2 | TEMPERATURE: 97.6 F | OXYGEN SATURATION: 96 % | DIASTOLIC BLOOD PRESSURE: 65 MMHG | HEIGHT: 65 IN | SYSTOLIC BLOOD PRESSURE: 103 MMHG

## 2018-12-05 DIAGNOSIS — K59.03 DRUG-INDUCED CONSTIPATION: Primary | ICD-10-CM

## 2018-12-05 RX ORDER — MAGNESIUM CARB/ALUMINUM HYDROX 105-160MG
296 TABLET,CHEWABLE ORAL ONCE
Qty: 296 ML | Refills: 1 | Status: SHIPPED | OUTPATIENT
Start: 2018-12-05 | End: 2018-12-05

## 2018-12-05 RX ORDER — ATROPINE SULFATE 10 MG/ML
1-2 SOLUTION/ DROPS OPHTHALMIC 4 TIMES DAILY
COMMUNITY
End: 2019-03-05

## 2018-12-05 ASSESSMENT — PAIN SCALES - GENERAL: PAINLEVEL: NO PAIN (0)
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
## 2018-12-05 NOTE — NURSING NOTE
"  Chief Complaint   Patient presents with     RECHECK     4-5 week follow up     Vitals:    12/05/18 1336   BP: 103/65   Pulse: 95   Temp: 97.6  F (36.4  C)   TempSrc: Oral   SpO2: 96%   Weight: 181 lb 3.2 oz   Height: 5' 4.96\"     Body mass index is 30.19 kg/(m^2).  Janet Trinh CMA    "

## 2018-12-05 NOTE — MR AVS SNAPSHOT
After Visit Summary   12/5/2018    Jonathon Broussard    MRN: [id number]           Patient Information     Date Of Birth          1980        Visit Information        Provider Department      12/5/2018 1:40 PM Aubrey Holden PA-C [facility]        Today's Diagnoses     Drug-induced constipation    -  1      Care Instructions    It was a pleasure taking care of you today.  I've included a brief summary of our discussion and care plan from today's visit below.  Please review this information with your primary care provider.  ______________________________________________________________________    My recommendations are summarized as follows:    -- Recommend Miralax.  This is not a stimulant laxative and is safe to take on a daily basis.  Try 3 cap(s) full every day.  You can titrate this dose (increase or decrease) based on stool frequency and consistency. Do not stop, just decrease the dose if needed.    -- If not bowel movements in 72 hours, then try 1 bottle magnesium citrate.    -- Recommend soluble fiber supplementation on a daily basis (Metamucil, citrucel or benefiber).  Start with 1 tablespoon per day and if tolerated, may increase up to 2-3 tablespoons per day.  You may experience some bloating with initiation of fiber supplementation that will improve over the first month.  A good fiber trial to evaluate the effect is 3-6 months.    Return to GI Clinic in 3 months to review your progress.    ______________________________________________________________________    Who do I call with any questions after my visit?  Please be in touch if there are any further questions that arise following today's visit.  There are multiple ways to contact your gastroenterology care team.        During business hours, you may reach a Gastroenterology nurse at [phone], option 3.       To schedule or reschedule an appointment, please call [phone].       You can always  send a secure message through [employer].  [employer] messages are answered by your nurse or doctor typically within 24 hours.  Please allow extra time on weekends and holidays.        For urgent/emergent questions after business hours, you may reach the on-call GI Fellow by contacting the [facility]  at [phone].      In order for your refill to be processed in a timely fashion, it is your responsibility to ensure you follow the recommendations from your provider regarding your laboratory studies and follow up appointments.       How will I get the results of any tests ordered?    You will receive all of your results.  If you have signed up for [employer], any tests ordered at your visit will be available to you after your physician reviews them.  Typically this takes 1-2 weeks.  If there are urgent results that require a change in your care plan, your physician or nurse will call you to discuss the next steps.      What is [employer]?  [employer] is a secure way for you to access all of your healthcare records from the [facility].  It is a web based computer program, so you can sign on to it from any location.  It also allows you to send secure messages to your care team.  I recommend signing up for [employer] access if you have not already done so and are comfortable with using a computer.      How to I schedule a follow-up visit?  If you did not schedule a follow-up visit today, please call [phone] to schedule a follow-up office visit.        Sincerely,    Aubrey Holden PA-C  [facility]  Division of Gastroenterology                Follow-ups after your visit        Follow-up notes from your care team     Return in about 3 months (around 3/5/2019).      Who to contact     Please call your clinic at [phone] to:    Ask questions about your health    Make or cancel appointments    Discuss your medicines    Learn about your test results    Speak to your doctor             "Additional Information About Your Visit        Care EveryWhere ID     This is your Care EveryWhere ID. This could be used by other organizations to access your [facility] medical records  [id number]        Your Vitals Were     Pulse Temperature Height Pulse Oximetry BMI (Body Mass Index)       95 97.6  F (36.4  C) (Oral) 1.65 m (5' 4.96\") 96% 30.19 kg/m2        Blood Pressure from Last 3 Encounters:   12/05/18 103/65   10/19/18 106/70   07/18/18 112/86    Weight from Last 3 Encounters:   12/05/18 82.2 kg (181 lb 3.2 oz)   10/19/18 87.4 kg (192 lb 11.2 oz)   07/18/18 91.4 kg (201 lb 9.6 oz)              Today, you had the following     No orders found for display         Today's Medication Changes          These changes are accurate as of 12/5/18  2:13 PM.  If you have any questions, ask your nurse or doctor.               Start taking these medicines.        Dose/Directions    magnesium citrate 1.745 GM/30ML solution   Used for:  Drug-induced constipation   Started by:  Aubrey Holden PA-C        Dose:  296 mL   Take 296 mLs by mouth once for 1 dose   Quantity:  296 mL   Refills:  1            Where to get your medicines      These medications were sent to [id number] - [place], [place] - [address]  [address] [place], [address] [facility] [address], [facility] [address]     Phone:  [phone]     magnesium citrate 1.745 GM/30ML solution                Primary Care Provider    None Specified       No primary provider on file.        Equal Access to Services     CHINO EUGENE : Hadmary Herrera, swathi kirkpatrick, qawill katish louis. So [facility] [phone].    ATENCIÓN: Si habla español, tiene a guevara disposición servicios gratuitos de asistencia lingüística. Llame al [phone].    We comply with applicable federal civil rights laws and [place] laws. We do not discriminate on the basis of race, color, national origin, age, disability, sex, sexual " orientation, or gender identity.            Thank you!     Thank you for choosing [facility]  for your care. Our goal is always to provide you with excellent care. Hearing back from our patients is one way we can continue to improve our services. Please take a few minutes to complete the written survey that you may receive in the mail after your visit with us. Thank you!             Your Updated Medication List - Protect others around you: Learn how to safely use, store and throw away your medicines at www.disposemymeds.org.          This list is accurate as of 12/5/18  2:13 PM.  Always use your most recent med list.                   Brand Name Dispense Instructions for use Diagnosis    acetaminophen 500 MG tablet    TYLENOL     2 tablets po TID PRN        amLODIPine 10 MG tablet    NORVASC     TAKE 1 TABLET BY MOUTH EVERY DAY DX HIGH BLOOD PRESSURE        AMPHETAMINE SULFATE PO      Take 50 mg by mouth daily        * atomoxetine 100 MG capsule    STRATTERA     One cap QAM (with one 25mg cap for total of 125mg QAM). Take after food.        * atomoxetine 25 MG capsule    STRATTERA     One cap QAM (with one 100mg cap for total of 125mg QAM). Take after food.        atropine 1 % ophthalmic solution      1-2 drops 4 times daily        benztropine 0.5 MG tablet    COGENTIN     Take 0.5 mg by mouth        BUSPIRONE HCL PO      Take 30 mg by mouth daily        carvedilol 12.5 MG tablet    COREG     TAKE 3 TABLETS (37.5mg) BY MOUTH TWICE DAILY DX HIGH BLOOD PRESSURE        CERTA-KONSTANTIN PO           * cloZAPine 100 MG tablet    CLOZARIL     400mg QHS. (4 tabs QHS)        * cloZAPine 100 MG tablet    CLOZARIL          diazepam 10 MG tablet    VALIUM     TAKE 1 TAB BY MOUTH THREE TIMES DAILY.IF TOO SEDATING ,CAN TAKE 1 2 TAB (5MG)        * diphenhydrAMINE 50 MG tablet    BENADRYL     One tab QID prn.        * BANOPHEN 50 MG capsule   Generic drug:  diphenhydrAMINE      TAKE 1 CAPSULE BY MOUTH FOUR  TIMES DAILY AS NEEDED         MG capsule   Generic drug:  docusate sodium      TAKE 1 CAP BY MOUTH ONCE DAILY        DOXEPIN HCL PO      Take 25 mg by mouth        FLEXERIL 10 MG tablet   Generic drug:  cyclobenzaprine     20    ONE 3 TIMES DAILY prn muscle spasm, no driving or drinking on this medication    Arthritis chronic, rheumatoid, juvenile       FREEZE DRIED ACIDOPHILUS Caps      TAKE 1 CAP BY MOUTH ONCE DAILY        GABAPENTIN PO      Take 1,200 mg by mouth 2 times daily Two tablets twice daily        hydrOXYzine 50 MG tablet    ATARAX     TAKE 1 TABLET BY MOUTH EVERY 4 HOURS AS NEEDED FOR ANXIETY,RESTLESSNESS,STIFF- NESS        INVEGA PO      Take 9 mg by mouth        LEVOTHYROXINE SODIUM PO      Take 112 mcg by mouth        * linaclotide 145 MCG capsule    LINZESS    30 capsule    Take 1 capsule (145 mcg) by mouth every morning (before breakfast)    Diarrhea, unspecified type       * linaclotide 72 MCG capsule    LINZESS    30 capsule    Take 1 capsule (72 mcg) by mouth every morning (before breakfast)    Drug-induced constipation       lisinopril 5 MG tablet    PRINIVIL/ZESTRIL     TAKE 1 TAB BY MOUTH ONCE DAILY        LITHIUM CARBONATE PO      Take 1,200 mg by mouth At Bedtime        LORazepam 2 MG tablet    ATIVAN     TAKE 1 TAB BY MOUTH THREE TIMES DAILY        * carboxymethylcellulose 0.5 % Soln ophthalmic solution    REFRESH PLUS     1 drop        * LUBRICATING PLUS EYE DROPS 0.5 % Soln ophthalmic solution   Generic drug:  Carboxymethylcellulose Sod PF      INSTILL 1 DROP INTO EACH EYE FOUR TIMES DAILY        magnesium citrate 1.745 GM/30ML solution     296 mL    Take 296 mLs by mouth once for 1 dose    Drug-induced constipation       NALTREXONE HCL PO      Take 50 mg by mouth        olopatadine 0.1 % ophthalmic solution    PATANOL     INSTILL 1 DROP TO BOTH EYES TWICE A DAY        omega 3 1000 MG Caps      Take 2 capsules by mouth 2 times daily        oxybutynin ER 10 MG 24 hr tablet     DITROPAN-XL     TAKE 1 TAB BY MOUTH ONCE DAILY        paliperidone 234 MG/1.5ML Susp    INVEGA SUSTENNA     Inject 234 mg into the muscle        PANTOPRAZOLE SODIUM PO      Take 40 mg by mouth every morning (before breakfast)        * polyethylene glycol powder    MIRALAX    510 g    Take 17 g (1 capful) by mouth 3 times daily    Drug-induced constipation, Hypothyroidism, unspecified type       * polyethylene glycol powder    MIRALAX    510 g    Take 17 g (1 capful) by mouth daily    Drug-induced constipation       PROPRANOLOL HCL PO      Take 80 mg by mouth 2 times daily        psyllium 58.6 % powder    METAMUCIL/KONSYL     Take by mouth daily        QUEtiapine 25 MG tablet    SEROquel     TAKE 1 TAB BY MOUTH AT BEDTIME        SM NICOTINE POLACRILEX 2 MG gum   Generic drug:  nicotine      CHEW 1 PIECE EVERY HOUR AS NEEDED FOR NICOTINE CRAVINGS        traZODone 50 MG tablet    DESYREL     TAKE 1 2 TAB (25MG) BY MOUTH AT BEDTIME        VALTREX 500 MG tablet   Generic drug:  valACYclovir     60    2 TABLETS DAILY    Herpes simplex without mention of complication       vancomycin 125 MG capsule    VANCOCIN    28 capsule    Take 1 capsule (125 mg) by mouth 4 times daily    Colitis due to Clostridium difficile       VENLAFAXINE HCL PO      Take 375 mg by mouth 2 times daily ER        VITAMIN D (CHOLECALCIFEROL) PO      Take 2,000 Units by mouth daily        * Notice:  This list has 12 medication(s) that are the same as other medications prescribed for you. Read the directions carefully, and ask your doctor or other care provider to review them with you.
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
## 2019-03-04 ENCOUNTER — TELEPHONE (OUTPATIENT)
Dept: GASTROENTEROLOGY | Facility: CLINIC | Age: 39
End: 2019-03-04

## 2019-03-05 ENCOUNTER — OFFICE VISIT (OUTPATIENT)
Dept: GASTROENTEROLOGY | Facility: CLINIC | Age: 39
End: 2019-03-05
Payer: MEDICARE

## 2019-03-05 VITALS
DIASTOLIC BLOOD PRESSURE: 77 MMHG | HEIGHT: 65 IN | HEART RATE: 103 BPM | WEIGHT: 182.3 LBS | BODY MASS INDEX: 30.37 KG/M2 | SYSTOLIC BLOOD PRESSURE: 111 MMHG | RESPIRATION RATE: 16 BRPM | TEMPERATURE: 98.4 F | OXYGEN SATURATION: 94 %

## 2019-03-05 DIAGNOSIS — K58.1 IRRITABLE BOWEL SYNDROME WITH CONSTIPATION: Primary | ICD-10-CM

## 2019-03-05 RX ORDER — ATOMOXETINE 25 MG/1
25 CAPSULE ORAL
COMMUNITY
Start: 2018-12-13 | End: 2019-03-05

## 2019-03-05 RX ORDER — TRIAMCINOLONE ACETONIDE 0.25 MG/G
CREAM TOPICAL
Refills: 1 | COMMUNITY
Start: 2019-02-15

## 2019-03-05 RX ORDER — BENZOCAINE/MENTHOL 6 MG-10 MG
LOZENGE MUCOUS MEMBRANE
COMMUNITY
Start: 2017-09-28

## 2019-03-05 RX ORDER — CLOZAPINE 100 MG/1
400 TABLET ORAL
COMMUNITY
Start: 2018-12-13 | End: 2019-03-05

## 2019-03-05 RX ORDER — IBUPROFEN 600 MG/1
1 TABLET, FILM COATED ORAL
COMMUNITY
Start: 2011-03-11 | End: 2020-02-05

## 2019-03-05 RX ORDER — SENNOSIDES 8.6 MG
8.6 TABLET ORAL
COMMUNITY
Start: 2017-04-18 | End: 2020-02-05

## 2019-03-05 RX ORDER — CARVEDILOL 12.5 MG/1
37.5 TABLET ORAL
COMMUNITY
Start: 2017-04-18 | End: 2019-03-05

## 2019-03-05 RX ORDER — BENZTROPINE MESYLATE 0.5 MG/1
0.5 TABLET ORAL
COMMUNITY
Start: 2018-12-13

## 2019-03-05 RX ORDER — NORGESTIMATE AND ETHINYL ESTRADIOL 0.25-0.035
1 KIT ORAL EVERY 24 HOURS
COMMUNITY
Start: 2011-06-28 | End: 2019-03-05 | Stop reason: ALTCHOICE

## 2019-03-05 RX ORDER — AMMONIUM LACTATE 12 G/100G
LOTION TOPICAL
COMMUNITY
Start: 2017-04-18 | End: 2020-02-05

## 2019-03-05 RX ORDER — ULTRASOUND COUPLING MEDIUM
GEL (GRAM) TOPICAL
COMMUNITY

## 2019-03-05 RX ORDER — LANSOPRAZOLE 30 MG/1
1 CAPSULE, DELAYED RELEASE ORAL EVERY 24 HOURS
COMMUNITY
Start: 2011-06-28 | End: 2020-02-05

## 2019-03-05 RX ORDER — LACTOBACILLUS RHAMNOSUS GG 15B CELL
1 CAPSULE, SPRINKLE ORAL
COMMUNITY
End: 2020-02-05

## 2019-03-05 RX ORDER — CARBOXYMETHYLCELLULOSE SODIUM 5 MG/ML
1 SOLUTION/ DROPS OPHTHALMIC
COMMUNITY
Start: 2018-04-26 | End: 2019-03-05

## 2019-03-05 RX ORDER — ATROPINE SULFATE 10 MG/ML
SOLUTION/ DROPS OPHTHALMIC
COMMUNITY
Start: 2019-02-27 | End: 2020-02-05

## 2019-03-05 RX ORDER — ATOMOXETINE 100 MG/1
100 CAPSULE ORAL
COMMUNITY
Start: 2018-12-13 | End: 2019-03-05

## 2019-03-05 RX ORDER — SIMETHICONE 125 MG/1
125 GEL ORAL
Refills: 0 | COMMUNITY
Start: 2019-02-08 | End: 2020-02-05

## 2019-03-05 RX ORDER — ALBUTEROL SULFATE 90 UG/1
2 AEROSOL, METERED RESPIRATORY (INHALATION)
COMMUNITY
Start: 2017-04-18 | End: 2020-02-05

## 2019-03-05 RX ORDER — MULTIVITAMIN
1 TABLET ORAL EVERY 24 HOURS
COMMUNITY
Start: 2011-06-28 | End: 2019-03-05

## 2019-03-05 ASSESSMENT — ENCOUNTER SYMPTOMS
ABDOMINAL PAIN: 1
NAIL CHANGES: 0
TROUBLE SWALLOWING: 0
RECTAL PAIN: 0
NAUSEA: 0
SINUS PAIN: 0
HEARTBURN: 0
INSOMNIA: 0
DIARRHEA: 1
BACK PAIN: 0
POOR WOUND HEALING: 0
BLOATING: 1
SINUS CONGESTION: 0
BLOOD IN STOOL: 0
CONSTIPATION: 1
SKIN CHANGES: 0
DEPRESSION: 1
NERVOUS/ANXIOUS: 1
VOMITING: 0
JAUNDICE: 0
SORE THROAT: 0
BOWEL INCONTINENCE: 1
HOARSE VOICE: 1

## 2019-03-05 ASSESSMENT — MIFFLIN-ST. JEOR: SCORE: 1500.91

## 2019-03-05 ASSESSMENT — PAIN SCALES - GENERAL: PAINLEVEL: NO PAIN (0)

## 2019-03-05 NOTE — PATIENT INSTRUCTIONS
It was a pleasure taking care of you today.  I've included a brief summary of our discussion and care plan from today's visit below.  Please review this information with your primary care provider.  ______________________________________________________________________    My recommendations are summarized as follows:    -- Recommend a trial of Miralax.  This is not a stimulant laxative and is safe to take on a daily basis.  Try 1-3 cap(s) full every day.  You can titrate this dose (increase or decrease) based on stool frequency and consistency.    -- Recommend soluble fiber supplementation on a daily basis (Metamucil, citrucel or benefiber).  Start with 1 tablespoon per day and if tolerated, may increase up to 2-3 tablespoons per day.  You may experience some bloating with initiation of fiber supplementation that will improve over the first month.  A good fiber trial to evaluate the effect is 3-6 months.    Return to GI Clinic in 6-9 months to review your progress.    ______________________________________________________________________    Who do I call with any questions after my visit?  Please be in touch if there are any further questions that arise following today's visit.  There are multiple ways to contact your gastroenterology care team.        During business hours, you may reach a Gastroenterology nurse at [phone], option 3.       To schedule or reschedule an appointment, please call [phone].       You can always send a secure message through [employer].  [employer] messages are answered by your nurse or doctor typically within 24 hours.  Please allow extra time on weekends and holidays.        For urgent/emergent questions after business hours, you may reach the on-call GI Fellow by contacting the [facility] at [phone].      In order for your refill to be processed in a timely fashion, it is your responsibility to ensure you follow the recommendations from your provider  regarding your laboratory studies and follow up appointments.       How will I get the results of any tests ordered?    You will receive all of your results.  If you have signed up for [employer], any tests ordered at your visit will be available to you after your physician reviews them.  Typically this takes 1-2 weeks.  If there are urgent results that require a change in your care plan, your physician or nurse will call you to discuss the next steps.      What is [employer]?  [employer] is a secure way for you to access all of your healthcare records from the [facility].  It is a web based computer program, so you can sign on to it from any location.  It also allows you to send secure messages to your care team.  I recommend signing up for [employer]rooms access if you have not already done so and are comfortable with using a computer.      How to I schedule a follow-up visit?  If you did not schedule a follow-up visit today, please call [phone] to schedule a follow-up office visit.        Sincerely,    Aubrey Holden PA-C  [facility]  Division of Gastroenterology

## 2019-03-05 NOTE — NURSING NOTE
"Chief Complaint   Patient presents with     Gastrointestinal Problem     constipation and abdominal pain       Vitals:    03/05/19 1353   BP: 111/77   BP Location: Left arm   Patient Position: Sitting   Cuff Size: Adult Regular   Pulse: 103   Resp: 16   Temp: 98.4  F (36.9  C)   TempSrc: Oral   SpO2: 94%   Weight: 82.7 kg (182 lb 4.8 oz)   Height: 1.64 m (5' 4.57\")       Body mass index is 30.74 kg/m .      Shirley Marcum LPN                          "
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
## 2019-11-05 ENCOUNTER — OFFICE VISIT (OUTPATIENT)
Dept: GASTROENTEROLOGY | Facility: CLINIC | Age: 39
End: 2019-11-05
Payer: MEDICARE

## 2019-11-05 ENCOUNTER — DOCUMENTATION ONLY (OUTPATIENT)
Dept: CARE COORDINATION | Facility: CLINIC | Age: 39
End: 2019-11-05

## 2019-11-05 VITALS
OXYGEN SATURATION: 96 % | TEMPERATURE: 97.5 F | HEART RATE: 99 BPM | WEIGHT: 186.6 LBS | HEIGHT: 65 IN | DIASTOLIC BLOOD PRESSURE: 69 MMHG | SYSTOLIC BLOOD PRESSURE: 111 MMHG | BODY MASS INDEX: 31.09 KG/M2

## 2019-11-05 DIAGNOSIS — K59.03 DRUG-INDUCED CONSTIPATION: Primary | ICD-10-CM

## 2019-11-05 ASSESSMENT — PAIN SCALES - GENERAL: PAINLEVEL: MODERATE PAIN (4)

## 2019-11-05 ASSESSMENT — MIFFLIN-ST. JEOR: SCORE: 1515.46
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
## 2019-11-05 NOTE — PATIENT INSTRUCTIONS
It was a pleasure taking care of you today.  I've included a brief summary of our discussion and care plan from today's visit below.  Please review this information with your primary care provider.  ______________________________________________________________________    My recommendations are summarized as follows:     -- Start Linzess 72 mcg daily. Anticipate loose stool when she starts this. Do not stop the medication because needs to clear out stool and will take some time to regulate. Please call our office with questions or concerns about this.     -- Recommend a trial of Miralax.  This is not a stimulant laxative and is safe to take on a daily basis.  Try 1-3 cap(s) full every day. She can take this every day if needed if linzess is not allowing for regular bowel movements. She can have this in her apartment. You can titrate this dose (increase or decrease) based on stool frequency and consistency.     -- Recommend soluble fiber supplementation on a daily basis (Metamucil, citrucel or benefiber).  Start with 1 tablespoon per day and if tolerated, may increase up to 2-3 tablespoons per day.  You may experience some bloating with initiation of fiber supplementation that will improve over the first month.  A good fiber trial to evaluate the effect is 3-6 months.    Return to GI Clinic in 3 months to review your progress.    ______________________________________________________________________    Who do I call with any questions after my visit?  Please be in touch if there are any further questions that arise following today's visit.  There are multiple ways to contact your gastroenterology care team.        During business hours, you may reach a Gastroenterology nurse at [phone], option 3.       To schedule or reschedule an appointment, please call [phone].       You can always send a secure message through [employer].  [employer] messages are answered by your nurse or doctor typically within 24 hours.   Please allow extra time on weekends and holidays.        For urgent/emergent questions after business hours, you may reach the on-call GI Fellow by contacting the [facility]  at [phone].      In order for your refill to be processed in a timely fashion, it is your responsibility to ensure you follow the recommendations from your provider regarding your laboratory studies and follow up appointments.       How will I get the results of any tests ordered?    You will receive all of your results.  If you have signed up for [employer], any tests ordered at your visit will be available to you after your physician reviews them.  Typically this takes 1-2 weeks.  If there are urgent results that require a change in your care plan, your physician or nurse will call you to discuss the next steps.      What is [employer]?  [employer] is a secure way for you to access all of your healthcare records from the [facility].  It is a web based computer program, so you can sign on to it from any location.  It also allows you to send secure messages to your care team.  I recommend signing up for [employer] access if you have not already done so and are comfortable with using a computer.      How to I schedule a follow-up visit?  If you did not schedule a follow-up visit today, please call [phone] to schedule a follow-up office visit.        Sincerely,    Aubrey Holden PA-C  [facility]  Division of Gastroenterology

## 2019-11-05 NOTE — NURSING NOTE
"Chief Complaint   Patient presents with     RECHECK     follow up        Vitals:    11/05/19 1312   BP: 111/69   Pulse: 99   Temp: 97.5  F (36.4  C)   TempSrc: Oral   SpO2: 96%   Weight: 84.6 kg (186 lb 9.6 oz)   Height: 1.64 m (5' 4.57\")       Body mass index is 31.47 kg/m .    Renee Chandler CMA    "

## 2019-11-08 ENCOUNTER — TELEPHONE (OUTPATIENT)
Dept: GASTROENTEROLOGY | Facility: CLINIC | Age: 39
End: 2019-11-08

## 2019-11-08 DIAGNOSIS — K58.1 IRRITABLE BOWEL SYNDROME WITH CONSTIPATION: Primary | ICD-10-CM

## 2019-11-08 NOTE — TELEPHONE ENCOUNTER
Spoke with pt's assisted living facility to confirm the fiber desired. Metamucil will be ordered accordingly.

## 2019-11-08 NOTE — TELEPHONE ENCOUNTER
[facility] Call Center    Phone Message    May a detailed message be left on voicemail: yes    Reason for Call: Medication Refill Request    Has the patient contacted the pharmacy for the refill? Yes   Name of medication being requested: Aubrey Holden recommend that the patient starting taking soluble fiber supplementation on a daily basis at her last appt. Jessica RN at patient's assisted living stated that the patient's insurance will cover this if is is prescribed. They are requesting a Rx be written, and sent to [id number] [place] [facility] [address]. Please call [phone] to verify with nurses.  Provider who prescribed the medication: Aubrey Holden  Pharmacy: [id number] [place] [facility] [address].  Date medication is needed: asap     Action Taken: Message routed to:  [facility] ([facility] GI

## 2019-11-13 NOTE — TELEPHONE ENCOUNTER
Patients assisted living facility called stating that medication for fiber is not covered and it needs to be exactly Fiber Therapy Powder Orange flavor generic name is methylcellulose. Please send correct order, Supplier is [facility]. Please return call to Jessica at [phone] thank you. National Drug Code for medication is 70820372779.

## 2020-02-04 ENCOUNTER — TELEPHONE (OUTPATIENT)
Dept: GASTROENTEROLOGY | Facility: CLINIC | Age: 40
End: 2020-02-04

## 2020-02-04 NOTE — TELEPHONE ENCOUNTER
Called and left message for patient reminding of appointment scheduled on 2/5/20 at 1220 with [facility] GI clinic. Patient to arrive 15 min early. To reschedule or cancel patient to call [phone].      NATASHA Altman

## 2020-02-05 ENCOUNTER — OFFICE VISIT (OUTPATIENT)
Dept: GASTROENTEROLOGY | Facility: CLINIC | Age: 40
End: 2020-02-05
Payer: MEDICAID

## 2020-02-05 VITALS
BODY MASS INDEX: 33.55 KG/M2 | TEMPERATURE: 98.9 F | HEART RATE: 103 BPM | OXYGEN SATURATION: 95 % | SYSTOLIC BLOOD PRESSURE: 110 MMHG | DIASTOLIC BLOOD PRESSURE: 63 MMHG | HEIGHT: 65 IN | WEIGHT: 201.4 LBS

## 2020-02-05 DIAGNOSIS — K59.03 DRUG-INDUCED CONSTIPATION: ICD-10-CM

## 2020-02-05 DIAGNOSIS — K21.9 GASTROESOPHAGEAL REFLUX DISEASE, ESOPHAGITIS PRESENCE NOT SPECIFIED: Primary | ICD-10-CM

## 2020-02-05 RX ORDER — POLYETHYLENE GLYCOL 3350 17 G/17G
1 POWDER, FOR SOLUTION ORAL DAILY
Qty: 510 G | Refills: 11 | Status: SHIPPED | OUTPATIENT
Start: 2020-02-05

## 2020-02-05 RX ORDER — LANSOPRAZOLE 30 MG/1
30 CAPSULE, DELAYED RELEASE ORAL EVERY 24 HOURS
Qty: 90 CAPSULE | Refills: 3 | Status: SHIPPED | OUTPATIENT
Start: 2020-02-05 | End: 2021-02-05

## 2020-02-05 ASSESSMENT — PAIN SCALES - GENERAL: PAINLEVEL: NO PAIN (0)

## 2020-02-05 ASSESSMENT — MIFFLIN-ST. JEOR: SCORE: 1582.59

## 2020-02-05 NOTE — NURSING NOTE
"Chief Complaint   Patient presents with     RECHECK     3 month follow up       Vitals:    02/05/20 1206   BP: 110/63   Pulse: 103   Temp: 98.9  F (37.2  C)   TempSrc: Oral   SpO2: 95%   Weight: 91.4 kg (201 lb 6.4 oz)   Height: 1.64 m (5' 4.57\")       Body mass index is 33.96 kg/m .    Renee Chandler CMA    "

## 2020-02-05 NOTE — PATIENT INSTRUCTIONS
It was a pleasure taking care of you today.  I've included a brief summary of our discussion and care plan from today's visit below.  Please review this information with your primary care provider.  ______________________________________________________________________    My recommendations are summarized as follows:    -- Recommend a trial of Miralax.  This is not a stimulant laxative and is safe to take on a daily basis. Take at least 2 cap(s) full every single day. She can have this in her apartment. You can titrate this dose (increase or decrease) based on stool frequency and consistency.    -- Start lansoprazole 30 mg daily, take this on an empty stomach for heartburn relief.     Return to GI Clinic in 3 months to review your progress.    ______________________________________________________________________    Who do I call with any questions after my visit?  Please be in touch if there are any further questions that arise following today's visit.  There are multiple ways to contact your gastroenterology care team.        During business hours, you may reach a Gastroenterology nurse at [phone], option [phone].       To schedule or reschedule an appointment, please call [phone].       You can always send a secure message through [employer].  [employer] messages are answered by your nurse or doctor typically within 24 hours.  Please allow extra time on weekends and holidays.        For urgent/emergent questions after business hours, you may reach the on-call GI Fellow by contacting the [facility] at [phone].      In order for your refill to be processed in a timely fashion, it is your responsibility to ensure you follow the recommendations from your provider regarding your laboratory studies and follow up appointments.       How will I get the results of any tests ordered?    You will receive all of your results.  If you have signed up for [employer], any tests ordered at your visit will be  available to you after your physician reviews them.  Typically this takes 1-2 weeks.  If there are urgent results that require a change in your care plan, your physician or nurse will call you to discuss the next steps.      What is [employer]?  [employer] is a secure way for you to access all of your healthcare records from the [facility].  It is a web based computer program, so you can sign on to it from any location.  It also allows you to send secure messages to your care team.  I recommend signing up for [employer] access if you have not already done so and are comfortable with using a computer.      How to I schedule a follow-up visit?  If you did not schedule a follow-up visit today, please call [phone] to schedule a follow-up office visit.        Sincerely,    Aubrey Holden PA-C  [facility]  Division of Gastroenterology
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
## 2020-04-03 NOTE — TELEPHONE ENCOUNTER
Continue home medications    Monitor closely [facility] Call Center    Phone Message    May a detailed message be left on voicemail: yes    Reason for Call: Other: Dariusz at [facility] would like to know if Pt should do another stool test to make sure the infection is gone     Action Taken: Message routed to:  [facility] ([facility]): Please call him at the nurse  line [phone]

## 2020-05-05 ENCOUNTER — TELEPHONE (OUTPATIENT)
Dept: GASTROENTEROLOGY | Facility: CLINIC | Age: 40
End: 2020-05-05

## 2020-05-12 ENCOUNTER — TRANSFERRED RECORDS (OUTPATIENT)
Dept: HEALTH INFORMATION MANAGEMENT | Facility: CLINIC | Age: 40
End: 2020-05-12

## 2020-05-12 ENCOUNTER — VIRTUAL VISIT (OUTPATIENT)
Dept: GASTROENTEROLOGY | Facility: CLINIC | Age: 40
End: 2020-05-12
Payer: MEDICAID

## 2020-05-12 DIAGNOSIS — K59.03 DRUG-INDUCED CONSTIPATION: Primary | ICD-10-CM

## 2020-05-12 ASSESSMENT — PAIN SCALES - GENERAL: PAINLEVEL: NO PAIN (0)

## 2020-05-12 NOTE — PATIENT INSTRUCTIONS
It was a pleasure taking care of you today.  I've included a brief summary of our discussion and care plan from today's visit below.  Please review this information with your primary care provider.  ______________________________________________________________________    My recommendations are summarized as follows:    -- if you are skipping days without Bowel movements, restart miralax on regular basis.     Return to GI Clinic in 6 months to review your progress.    ______________________________________________________________________    Who do I call with any questions after my visit?  Please be in touch if there are any further questions that arise following today's visit.  There are multiple ways to contact your gastroenterology care team.        During business hours, you may reach a Gastroenterology nurse at [phone], option [phone].       To schedule or reschedule an appointment, please call [phone].       You can always send a secure message through [employer].  [employer] messages are answered by your nurse or doctor typically within 24 hours.  Please allow extra time on weekends and holidays.        For urgent/emergent questions after business hours, you may reach the on-call GI Fellow by contacting the [facility]  at [phone].      In order for your refill to be processed in a timely fashion, it is your responsibility to ensure you follow the recommendations from your provider regarding your laboratory studies and follow up appointments.       How will I get the results of any tests ordered?    You will receive all of your results.  If you have signed up for [employer], any tests ordered at your visit will be available to you after your physician reviews them.  Typically this takes 1-2 weeks.  If there are urgent results that require a change in your care plan, your physician or nurse will call you to discuss the next steps.      What is [employer]?  [employer] is a secure way for you to  access all of your healthcare records from the [facility].  It is a web based computer program, so you can sign on to it from any location.  It also allows you to send secure messages to your care team.  I recommend signing up for [employer] access if you have not already done so and are comfortable with using a computer.      How to I schedule a follow-up visit?  If you did not schedule a follow-up visit today, please call [phone] to schedule a follow-up office visit.        Sincerely,    Aubrey Holden PA-C  [facility]  Division of Gastroenterology

## 2020-05-12 NOTE — NURSING NOTE
Chief Complaint   Patient presents with     RECHECK     follow up       There were no vitals filed for this visit.    There is no height or weight on file to calculate BMI.    Renee Hedrick, CMA
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
## 2020-06-23 ENCOUNTER — TELEPHONE (OUTPATIENT)
Dept: SURGERY | Facility: CLINIC | Age: 40
End: 2020-06-23

## 2020-06-23 NOTE — TELEPHONE ENCOUNTER
Patient interested in weight loss surgery    Jonathon Broussard    Scheduled to see CNP or FIDE at 1015.    Spoke to patient: regarding appt on wed June 24th.    Can find information on [employer]atrix products at: [email or web address]    Instructed to view seminar and complete pre-visit questionnaire prior to visit at [email or web address].    [phone] contact center phone number      Lupe Colon, EMT

## 2020-06-24 ENCOUNTER — VIRTUAL VISIT (OUTPATIENT)
Dept: SURGERY | Facility: CLINIC | Age: 40
End: 2020-06-24
Payer: MEDICAID

## 2020-06-24 VITALS — BODY MASS INDEX: 37.99 KG/M2 | HEIGHT: 65 IN | WEIGHT: 228 LBS

## 2020-06-24 DIAGNOSIS — E66.01 CLASS 2 SEVERE OBESITY DUE TO EXCESS CALORIES WITH SERIOUS COMORBIDITY AND BODY MASS INDEX (BMI) OF 38.0 TO 38.9 IN ADULT (H): Primary | ICD-10-CM

## 2020-06-24 DIAGNOSIS — E66.812 CLASS 2 SEVERE OBESITY DUE TO EXCESS CALORIES WITH SERIOUS COMORBIDITY AND BODY MASS INDEX (BMI) OF 38.0 TO 38.9 IN ADULT (H): Primary | ICD-10-CM

## 2020-06-24 ASSESSMENT — MIFFLIN-ST. JEOR: SCORE: 1698.25

## 2020-06-24 ASSESSMENT — PAIN SCALES - GENERAL: PAINLEVEL: NO PAIN (0)

## 2020-06-24 NOTE — Clinical Note
Can you please call pt to schedule RD visit soon and in 1 month following?  She also needs to see me in 1-2 months. Thanks Jennifer

## 2020-06-24 NOTE — Clinical Note
Maegan can you fax labs to this pts assisted living? [facility].  [phone] is fax number.  Please ask them to fax results back to us.  Thanks!

## 2020-06-24 NOTE — NURSING NOTE
"Chief Complaint   Patient presents with     Consult     New appointment.       Vitals:    06/24/20 0826   Weight: 103.4 kg (228 lb)   Height: 1.64 m (5' 4.57\")       Body mass index is 38.45 kg/m .                            CHRISTINA MAURER, EMT      "

## 2020-06-24 NOTE — LETTER
"2020       RE: Jonathon Broussard  [address]  [address]  [facility] [address]     Dear Colleague,    Thank you for referring your patient, Jonathon Broussadr, to the [facility] SURGICAL WEIGHT MANAGEMENT at [facility]. Please see a copy of my visit note below.    Jonathon Broussard is a 40 year old female who is being evaluated via a billable telephone visit.        Phone call duration: 15 minutes    Jennifer Sanchez PA-C         New Medical Weight Management Consult    PATIENT:  Jonathon Broussard  MRN:         [id number]  :         1980  PO:         2020    Dear Primary Care Provider,    I had the pleasure of seeing your patient, Jonathon Broussard. Full intake/assessment was done to determine barriers to weight loss success and develop a treatment plan. Jonathon Broussard is a 40 year old female interested in treatment of medical problems associated with excess weight. She has a height of 5' 4.57\", a weight of 228 lbs 0 oz, and the calculated Body mass index is 38.45 kg/m .    Interested in Ozempic.  Her friend is taking it and lost 50 lbs.  She has had weight gain due to psychiatric medications.      She has the following co-morbidities:       2020   I have the following health issues associated with obesity: High Blood Pressure, Sleep Apnea, GERD (Reflux), Asthma   I have the following symptoms associated with obesity: None of the above       Patient Goals 2020   I am interested in having a healthier weight to diminish current health problems: Yes   I am interested in having a healthier weight in order to prevent future health problems: Yes   I am interested in having a healthier weight in order to have a future surgery: No       Referring Provider 2020   Please name the provider who referred you to Medical Weight Management.  If you do not know, please answer: \"I Don't Know\". friend       Weight History 2020   How concerned are " you about your weight? Very Concerned   Would you describe your weight gain as gradual? Yes   I became overweight: As an Adult   The following factors have contributed to my weight gain:  Started on Medication that Caused Weight Gain, A Health Crisis   I have tried the following methods to lose weight: Fasting   My lowest weight since age 18 was: 140   My highest weight since age 18 was: 265   The most weight I have ever lost was: (lbs) 30   Has anyone in your family had weight loss surgery? No   How has your weight changed over the last year?  Gained   How many pounds? 27       Diet Recall Review with Patient 6/24/2020   Do you typically eat breakfast? No   Do you typically eat lunch? Yes   If you do eat lunch, what types of food do you typically eat?  sandwich   Do you typically eat supper? Yes   If you do eat supper, what types of food do you typically eat? meat, casserole   Do you typically eat snacks? Yes   If you do snack, what types of food do you typically eat? chips or chocolate   Do you like vegetables?  Yes   Do you drink water? Yes   How many glasses of juice do you drink in a typical day? 0   How many of glasses of milk do you drink in a typical day? 0   How many 8oz glasses of sugar containing drinks such as Carlos-Aid/sweet tea do you drink in a day? 0   How many cans/bottles of sugar pop/soda/tea/sports drinks do you drink in a day? 3   How many cans/bottles of diet pop/soda/tea or sports drink do you drink in a day? 0   How often do you have a drink of alcohol? Never       Eating Habits 6/24/2020   Generally, my meals include foods like these: bread, pasta, rice, potatoes, corn, crackers, sweet dessert, pop, or juice. Almost Everyday   Generally, my meals include foods like these: fried meats, brats, burgers, french fries, pizza, cheese, chips, or ice cream. Never   Eat fast food (like [employer], [employer], [employer]). Never   Eat at a buffet or sit-down restaurant. Less Than Weekly   Eat most of my  meals in front of the TV or computer. Everyday   Often skip meals, eat at random times, have no regular eating times. Never   Rarely sit down for a meal but snack or graze throughout.  Never   Eat extra snacks between meals. Almost Everyday   Eat most of my food at the end of the day. Never   Eat in the middle of the night or wake up at night to eat. A Few Times a Week   Eat extra snacks to prevent or correct low blood sugar. Never   Eat to prevent acid reflux or stomach pain. Never   Worry about not having enough food to eat. Never   I eat when I am depressed. A Few Times a Week   I eat when I am stressed. A Few Times a Week   I eat when I am bored. A Few Times a Week   I eat when I am anxious. A Few Times a Week   I eat when I am happy or as a reward. A Few Times a Week   I feel hungry all the time even if I just have eaten. A Few Times a Week   Feeling full is important to me. Almost Everyday   I finish all the food on my plate even if I am already full. Almost Everyday   I can't resist eating delicious food or walk past the good food/smell. Never   I eat/snack without noticing that I am eating. Never   I eat when I am preparing the meal. A Few Times a Week   I eat more than usual when I see others eating. Never   I have trouble not eating sweets, ice cream, cookies, or chips if they are around the house. A Few Times a Week   I think about food all day. Almost Everyday   What foods, if any, do you crave? Sweets/Candy/Chocolate   Please list any other foods you crave? pasta       Amount of Food 6/24/2020   I make myself vomit what I have eaten or use laxatives to get rid of food. Never   I eat a large amount of food, like a loaf of bread, a box of cookies, a pint/quart of ice cream, all at once. Monthly   I eat a large amount of food even when I am not hungry. Monthly   I eat rapidly. Monthly   I eat alone because I feel embarrassed and do not want others to see how much I have eaten. Never   I eat until I am  uncomfortably full. Monthly   I feel bad, disgusted, or guilty after I overeat. Weekly   I make myself vomit what I have eaten or use laxatives to get rid of food. Never       Activity/Exercise History 6/24/2020   How much of a typical 12 hour day do you spend sitting? Most of the Day   How much of a typical 12 hour day do you spend lying down? Less Than Half the Day   How much of a typical day do you spend walking/standing? Less Than Half the Day   How many hours (not including work) do you spend on the TV/Video Games/Computer/Tablet/Phone? 6 Hours or More   How many times a week are you active for the purpose of exercise? 2-3 Times a Week   What keeps you from being more active? Other   How many total minutes do you spend doing some activity for the purpose of exercising when you exercise? 15-30 Minutes       PAST MEDICAL HISTORY:  Past Medical History:   Diagnosis Date     ADHD (attention deficit hyperactivity disorder)      Bipolar I disorder, most recent episode (or current) unspecified '98    initially dx as depression, then bipolar     Calculus of gallbladder without mention of cholecystitis or obstruction 5/2015     Cannabis dependence, in remission (H) 3/04    treatment x2, last use 6/06     Fibromyalgia      Gastro-oesophageal reflux disease      HTN (hypertension)      Juvenile idiopathic arthritis (H)      OCD (obsessive compulsive disorder)      Snores      Thyroid disease     hypothyroidism       Work/Social History Reviewed With Patient 6/24/2020   My employment status is: Disabled   What is your marital status? Single   If in a relationship, is your significant other overweight? N/A   If you have children, are they overweight? No   Who do you live with?  self   Who does the food shopping?  self       Mental Health History Reviewed With Patient 6/24/2020   Have you ever been physically or sexually abused? No   If yes, do you feel that the abuse is affecting your weight? N/A   If yes, would you like to  talk to a counselor about the abuse? N/A   How often in the past 2 weeks have you felt little interest or pleasure in doing things? Not at all   Over the past 2 weeks how often have you felt down, depressed, or hopeless? Not at all       Sleep History Reviewed With Patient 6/24/2020   How many hours do you sleep at night? 11   Do you think that you snore loudly or has anybody ever heard you snore loudly (louder than talking or so loud it can be heard behind a shut door)? No   Has anyone seen or heard you stop breathing during your sleep? No   Do you often feel tired, fatigued, or sleepy during the day? No   Do you have a TV/Computer in your bedroom? No       MEDICATIONS:   Current Outpatient Medications   Medication Sig Dispense Refill     amLODIPine (NORVASC) 10 MG tablet TAKE 1 TABLET BY MOUTH EVERY DAY DX HIGH BLOOD PRESSURE  11     atomoxetine (STRATTERA) 100 MG capsule One cap QAM (with one 25mg cap for total of 125mg QAM). Take after food.       atomoxetine (STRATTERA) 25 MG capsule One cap QAM (with one 100mg cap for total of 125mg QAM). Take after food.       benztropine (COGENTIN) 0.5 MG tablet Take 0.5 mg by mouth       carvedilol (COREG) 12.5 MG tablet TAKE 3 TABLETS (37.5mg) BY MOUTH TWICE DAILY DX HIGH BLOOD PRESSURE  11     cholecalciferol (VITAMIN D-1000 MAX ST) 1000 units TABS Take 1 tablet by mouth every 24 hours       cloZAPine (CLOZARIL) 100 MG tablet 400mg QHS. (4 tabs QHS)       diazepam (VALIUM) 10 MG tablet TAKE 1 TAB BY MOUTH THREE TIMES DAILY.IF TOO SEDATING ,CAN TAKE 1 2 TAB (5MG)  4     diphenhydrAMINE HCl 50 MG TABS One tab QID prn.       GABAPENTIN PO Take 1,200 mg by mouth 2 times daily Two tablets twice daily       hydrocortisone (CORTAID) 1 % external cream        LANsoprazole (PREVACID) 30 MG DR capsule Take 1 capsule (30 mg) by mouth every 24 hours 90 capsule 3     LEVOTHYROXINE SODIUM PO Take 112 mcg by mouth       lisinopril (PRINIVIL/ZESTRIL) 5 MG tablet TAKE 1 TAB BY MOUTH ONCE  DAILY  11     LORazepam (ATIVAN) 2 MG tablet TAKE 1 TAB BY MOUTH THREE TIMES DAILY  4     Lubricants (SURGICAL LUBRICANT) external gel        LUBRICATING PLUS EYE DROPS 0.5 % SOLN ophthalmic solution INSTILL 1 DROP INTO EACH EYE FOUR TIMES DAILY  11     Multiple Vitamins-Minerals (CERTA-KONSTANTIN PO)        olopatadine (PATANOL) 0.1 % ophthalmic solution INSTILL 1 DROP TO BOTH EYES TWICE A DAY  5     oxybutynin (DITROPAN-XL) 10 MG 24 hr tablet TAKE 1 TAB BY MOUTH ONCE DAILY  99     Paliperidone (INVEGA PO) Take 9 mg by mouth       paliperidone (INVEGA SUSTENNA) 234 MG/1.5ML SUSP Inject 234 mg into the muscle       polyethylene glycol (MIRALAX) powder Take 17 g (1 capful) by mouth daily 510 g 11     SM NICOTINE POLACRILEX 2 MG gum CHEW 1 PIECE EVERY HOUR AS NEEDED FOR NICOTINE CRAVINGS  16     triamcinolone (KENALOG) 0.025 % cream APPLY TWICE DAILY TO RASH ON WRIST AND UNDER ARMPITS  1     VENLAFAXINE HCL PO Take 375 mg by mouth 2 times daily ER       VITAMIN D, CHOLECALCIFEROL, PO Take 2,000 Units by mouth daily          ALLERGIES:   Allergies   Allergen Reactions     Haldol [Haloperidol] Tinnitus     Zyprexa [Olanzapine] Visual Disturbance and Other (See Comments)     akathesia       ASSESSMENT/PLAN: New MWM consult with BMI 38.  She is interested in medical weight management.  She has struggled with weight gain since starting psychiatric medications years ago. She struggles with increased hunger all day.  Her food is provided by assisted living facility and delivered to her room.  She also has access to many snacks and high carbohydrate/processed foods.    Consider Ozempic 0.25mg weekly for 4 week and then 0.5 mg after that.  Will recheck A1C first  Lab orders sent to assisted living facility today  If A1C preDM or DM start Ozempic.  Nursing facility can administer weekly injections  Follow up Jennifer Sanchez 1 month after starting medication. Call [phone] to schedule  Schedule phone visit with dietitian as soon as  possible and in 1 month. Call [phone] to schedule  Discussed minimizing/eliminating high carbohydrate and processed foods and snacks.  When given meals focus on meats and non starchy vegetables and eliminate potatoes/rice/pasta/corn.  Avoid having snacks in your apartment which makes it difficult to avoid eating them.          FOLLOW-UP:   4 weeks.      Sincerely,    Jennifer Sanchez PA-C

## 2020-06-24 NOTE — PROGRESS NOTES
"Jonathon Broussard is a 40 year old female who is being evaluated via a billable telephone visit.      The patient has been notified of following:     \"This telephone visit will be conducted via a call between you and your physician/provider. We have found that certain health care needs can be provided without the need for a physical exam.  This service lets us provide the care you need with a short phone conversation.  If a prescription is necessary we can send it directly to your pharmacy.  If lab work is needed we can place an order for that and you can then stop by our lab to have the test done at a later time.    Telephone visits are billed at different rates depending on your insurance coverage. During this emergency period, for some insurers they may be billed the same as an in-person visit.  Please reach out to your insurance provider with any questions.    If during the course of the call the physician/provider feels a telephone visit is not appropriate, you will not be charged for this service.\"    Patient has given verbal consent for Telephone visit?  Yes    What phone number would you like to be contacted at? [phone]    How would you like to obtain your AVS? Mail a copy    Phone call duration: 15 minutes    Jennifer Sanchez PA-C         New Medical Weight Management Consult    PATIENT:  Jonathon Broussard  MRN:         [id number]  :         1980  PO:         2020    Dear Primary Care Provider,    I had the pleasure of seeing your patient, Jonathon Broussard. Full intake/assessment was done to determine barriers to weight loss success and develop a treatment plan. Jonathon Broussard is a 40 year old female interested in treatment of medical problems associated with excess weight. She has a height of 5' 4.57\", a weight of 228 lbs 0 oz, and the calculated Body mass index is 38.45 kg/m .    Interested in Ozempic.  Her friend is taking it and lost 50 lbs.  She has had weight gain due to " "psychiatric medications.      She has the following co-morbidities:       6/24/2020   I have the following health issues associated with obesity: High Blood Pressure, Sleep Apnea, GERD (Reflux), Asthma   I have the following symptoms associated with obesity: None of the above       Patient Goals 6/24/2020   I am interested in having a healthier weight to diminish current health problems: Yes   I am interested in having a healthier weight in order to prevent future health problems: Yes   I am interested in having a healthier weight in order to have a future surgery: No       Referring Provider 6/24/2020   Please name the provider who referred you to Medical Weight Management.  If you do not know, please answer: \"I Don't Know\". friend       Weight History 6/24/2020   How concerned are you about your weight? Very Concerned   Would you describe your weight gain as gradual? Yes   I became overweight: As an Adult   The following factors have contributed to my weight gain:  Started on Medication that Caused Weight Gain, A Health Crisis   I have tried the following methods to lose weight: Fasting   My lowest weight since age 18 was: 140   My highest weight since age 18 was: 265   The most weight I have ever lost was: (lbs) 30   Has anyone in your family had weight loss surgery? No   How has your weight changed over the last year?  Gained   How many pounds? 27       Diet Recall Review with Patient 6/24/2020   Do you typically eat breakfast? No   Do you typically eat lunch? Yes   If you do eat lunch, what types of food do you typically eat?  sandwich   Do you typically eat supper? Yes   If you do eat supper, what types of food do you typically eat? meat, casserole   Do you typically eat snacks? Yes   If you do snack, what types of food do you typically eat? chips or chocolate   Do you like vegetables?  Yes   Do you drink water? Yes   How many glasses of juice do you drink in a typical day? 0   How many of glasses of milk do " you drink in a typical day? 0   How many 8oz glasses of sugar containing drinks such as [employer]-Aid/sweet tea do you drink in a day? 0   How many cans/bottles of sugar pop/soda/tea/sports drinks do you drink in a day? 3   How many cans/bottles of diet pop/soda/tea or sports drink do you drink in a day? 0   How often do you have a drink of alcohol? Never       Eating Habits 6/24/2020   Generally, my meals include foods like these: bread, pasta, rice, potatoes, corn, crackers, sweet dessert, pop, or juice. Almost Everyday   Generally, my meals include foods like these: fried meats, brats, burgers, french fries, pizza, cheese, chips, or ice cream. Never   Eat fast food (like [employer], [employer], [employer]aco [employer]). Never   Eat at a buffet or sit-down restaurant. Less Than Weekly   Eat most of my meals in front of the TV or computer. Everyday   Often skip meals, eat at random times, have no regular eating times. Never   Rarely sit down for a meal but snack or graze throughout.  Never   Eat extra snacks between meals. Almost Everyday   Eat most of my food at the end of the day. Never   Eat in the middle of the night or wake up at night to eat. A Few Times a Week   Eat extra snacks to prevent or correct low blood sugar. Never   Eat to prevent acid reflux or stomach pain. Never   Worry about not having enough food to eat. Never   I eat when I am depressed. A Few Times a Week   I eat when I am stressed. A Few Times a Week   I eat when I am bored. A Few Times a Week   I eat when I am anxious. A Few Times a Week   I eat when I am happy or as a reward. A Few Times a Week   I feel hungry all the time even if I just have eaten. A Few Times a Week   Feeling full is important to me. Almost Everyday   I finish all the food on my plate even if I am already full. Almost Everyday   I can't resist eating delicious food or walk past the good food/smell. Never   I eat/snack without noticing that I am eating. Never   I eat when I am preparing the  meal. A Few Times a Week   I eat more than usual when I see others eating. Never   I have trouble not eating sweets, ice cream, cookies, or chips if they are around the house. A Few Times a Week   I think about food all day. Almost Everyday   What foods, if any, do you crave? Sweets/Candy/Chocolate   Please list any other foods you crave? pasta       Amount of Food 6/24/2020   I make myself vomit what I have eaten or use laxatives to get rid of food. Never   I eat a large amount of food, like a loaf of bread, a box of cookies, a pint/quart of ice cream, all at once. Monthly   I eat a large amount of food even when I am not hungry. Monthly   I eat rapidly. Monthly   I eat alone because I feel embarrassed and do not want others to see how much I have eaten. Never   I eat until I am uncomfortably full. Monthly   I feel bad, disgusted, or guilty after I overeat. Weekly   I make myself vomit what I have eaten or use laxatives to get rid of food. Never       Activity/Exercise History 6/24/2020   How much of a typical 12 hour day do you spend sitting? Most of the Day   How much of a typical 12 hour day do you spend lying down? Less Than Half the Day   How much of a typical day do you spend walking/standing? Less Than Half the Day   How many hours (not including work) do you spend on the TV/Video Games/Computer/Tablet/Phone? 6 Hours or More   How many times a week are you active for the purpose of exercise? 2-3 Times a Week   What keeps you from being more active? Other   How many total minutes do you spend doing some activity for the purpose of exercising when you exercise? 15-30 Minutes       PAST MEDICAL HISTORY:  Past Medical History:   Diagnosis Date     ADHD (attention deficit hyperactivity disorder)      Bipolar I disorder, most recent episode (or current) unspecified '98    initially dx as depression, then bipolar     Calculus of gallbladder without mention of cholecystitis or obstruction 5/2015     Cannabis  dependence, in remission (H) 3/04    treatment x2, last use 6/06     Fibromyalgia      Gastro-oesophageal reflux disease      HTN (hypertension)      Juvenile idiopathic arthritis (H)      OCD (obsessive compulsive disorder)      Snores      Thyroid disease     hypothyroidism       Work/Social History Reviewed With Patient 6/24/2020   My employment status is: Disabled   What is your marital status? Single   If in a relationship, is your significant other overweight? N/A   If you have children, are they overweight? No   Who do you live with?  self   Who does the food shopping?  self       Mental Health History Reviewed With Patient 6/24/2020   Have you ever been physically or sexually abused? No   If yes, do you feel that the abuse is affecting your weight? N/A   If yes, would you like to talk to a counselor about the abuse? N/A   How often in the past 2 weeks have you felt little interest or pleasure in doing things? Not at all   Over the past 2 weeks how often have you felt down, depressed, or hopeless? Not at all       Sleep History Reviewed With Patient 6/24/2020   How many hours do you sleep at night? 11   Do you think that you snore loudly or has anybody ever heard you snore loudly (louder than talking or so loud it can be heard behind a shut door)? No   Has anyone seen or heard you stop breathing during your sleep? No   Do you often feel tired, fatigued, or sleepy during the day? No   Do you have a TV/Computer in your bedroom? No       MEDICATIONS:   Current Outpatient Medications   Medication Sig Dispense Refill     amLODIPine (NORVASC) 10 MG tablet TAKE 1 TABLET BY MOUTH EVERY DAY DX HIGH BLOOD PRESSURE  11     atomoxetine (STRATTERA) 100 MG capsule One cap QAM (with one 25mg cap for total of 125mg QAM). Take after food.       atomoxetine (STRATTERA) 25 MG capsule One cap QAM (with one 100mg cap for total of 125mg QAM). Take after food.       benztropine (COGENTIN) 0.5 MG tablet Take 0.5 mg by mouth        carvedilol (COREG) 12.5 MG tablet TAKE 3 TABLETS (37.5mg) BY MOUTH TWICE DAILY DX HIGH BLOOD PRESSURE  11     cholecalciferol (VITAMIN D-1000 MAX ST) 1000 units TABS Take 1 tablet by mouth every 24 hours       cloZAPine (CLOZARIL) 100 MG tablet 400mg QHS. (4 tabs QHS)       diazepam (VALIUM) 10 MG tablet TAKE 1 TAB BY MOUTH THREE TIMES DAILY.IF TOO SEDATING ,CAN TAKE 1 2 TAB (5MG)  4     diphenhydrAMINE HCl 50 MG TABS One tab QID prn.       GABAPENTIN PO Take 1,200 mg by mouth 2 times daily Two tablets twice daily       hydrocortisone (CORTAID) 1 % external cream        LANsoprazole (PREVACID) 30 MG DR capsule Take 1 capsule (30 mg) by mouth every 24 hours 90 capsule 3     LEVOTHYROXINE SODIUM PO Take 112 mcg by mouth       lisinopril (PRINIVIL/ZESTRIL) 5 MG tablet TAKE 1 TAB BY MOUTH ONCE DAILY  11     LORazepam (ATIVAN) 2 MG tablet TAKE 1 TAB BY MOUTH THREE TIMES DAILY  4     Lubricants (SURGICAL LUBRICANT) external gel        LUBRICATING PLUS EYE DROPS 0.5 % SOLN ophthalmic solution INSTILL 1 DROP INTO EACH EYE FOUR TIMES DAILY  11     Multiple Vitamins-Minerals (CERTA-KONSTANTIN PO)        olopatadine (PATANOL) 0.1 % ophthalmic solution INSTILL 1 DROP TO BOTH EYES TWICE A DAY  5     oxybutynin (DITROPAN-XL) 10 MG 24 hr tablet TAKE 1 TAB BY MOUTH ONCE DAILY  99     Paliperidone (INVEGA PO) Take 9 mg by mouth       paliperidone (INVEGA SUSTENNA) 234 MG/1.5ML SUSP Inject 234 mg into the muscle       polyethylene glycol (MIRALAX) powder Take 17 g (1 capful) by mouth daily 510 g 11     SM NICOTINE POLACRILEX 2 MG gum CHEW 1 PIECE EVERY HOUR AS NEEDED FOR NICOTINE CRAVINGS  16     triamcinolone (KENALOG) 0.025 % cream APPLY TWICE DAILY TO RASH ON WRIST AND UNDER ARMPITS  1     VENLAFAXINE HCL PO Take 375 mg by mouth 2 times daily ER       VITAMIN D, CHOLECALCIFEROL, PO Take 2,000 Units by mouth daily          ALLERGIES:   Allergies   Allergen Reactions     Haldol [Haloperidol] Tinnitus     Zyprexa [Olanzapine] Visual  Disturbance and Other (See Comments)     akathesia       ASSESSMENT/PLAN: New MWM consult with BMI 38.  She is interested in medical weight management.  She has struggled with weight gain since starting psychiatric medications years ago. She struggles with increased hunger all day.  Her food is provided by assisted living facility and delivered to her room.  She also has access to many snacks and high carbohydrate/processed foods.    Consider Ozempic 0.25mg weekly for 4 week and then 0.5 mg after that.  Will recheck A1C first  Lab orders sent to assisted living facility today  If A1C preDM or DM start Ozempic.  Nursing facility can administer weekly injections  Follow up Jennifer Sanchez 1 month after starting medication. Call [phone] to schedule  Schedule phone visit with dietitian as soon as possible and in 1 month. Call [phone] to schedule  Discussed minimizing/eliminating high carbohydrate and processed foods and snacks.  When given meals focus on meats and non starchy vegetables and eliminate potatoes/rice/pasta/corn.  Avoid having snacks in your apartment which makes it difficult to avoid eating them.          FOLLOW-UP:   4 weeks.      Sincerely,    Jennifer Sanchez PA-C

## 2020-06-25 ENCOUNTER — TRANSFERRED RECORDS (OUTPATIENT)
Dept: HEALTH INFORMATION MANAGEMENT | Facility: CLINIC | Age: 40
End: 2020-06-25

## 2020-06-26 ENCOUNTER — TELEPHONE (OUTPATIENT)
Dept: SURGERY | Facility: CLINIC | Age: 40
End: 2020-06-26

## 2020-06-26 NOTE — TELEPHONE ENCOUNTER
LVM to schedule appts (RD visit soon and in 1 month following?  She also needs to see me in 1-2 months) provided call center number -[facility]

## 2020-06-26 NOTE — TELEPHONE ENCOUNTER
Dariusz, nurse, at assisted living facility would like pts lab orders faxed to them at [phone]    [facility] direct number  [phone]

## 2020-07-03 ENCOUNTER — TELEPHONE (OUTPATIENT)
Dept: ENDOCRINOLOGY | Facility: CLINIC | Age: 40
End: 2020-07-03

## 2020-08-05 ENCOUNTER — TELEPHONE (OUTPATIENT)
Dept: SURGERY | Facility: CLINIC | Age: 40
End: 2020-08-05

## 2020-08-05 ENCOUNTER — VIRTUAL VISIT (OUTPATIENT)
Dept: SURGERY | Facility: CLINIC | Age: 40
End: 2020-08-05
Payer: MEDICAID

## 2020-08-05 ENCOUNTER — TELEPHONE (OUTPATIENT)
Dept: ENDOCRINOLOGY | Facility: CLINIC | Age: 40
End: 2020-08-05

## 2020-08-05 VITALS — BODY MASS INDEX: 36.32 KG/M2 | HEIGHT: 65 IN | WEIGHT: 218 LBS

## 2020-08-05 DIAGNOSIS — E66.01 CLASS 2 SEVERE OBESITY DUE TO EXCESS CALORIES WITH SERIOUS COMORBIDITY AND BODY MASS INDEX (BMI) OF 38.0 TO 38.9 IN ADULT (H): Primary | ICD-10-CM

## 2020-08-05 DIAGNOSIS — E66.812 CLASS 2 SEVERE OBESITY DUE TO EXCESS CALORIES WITH SERIOUS COMORBIDITY AND BODY MASS INDEX (BMI) OF 38.0 TO 38.9 IN ADULT (H): Primary | ICD-10-CM

## 2020-08-05 ASSESSMENT — PAIN SCALES - GENERAL: PAINLEVEL: NO PAIN (0)

## 2020-08-05 ASSESSMENT — MIFFLIN-ST. JEOR: SCORE: 1652.89

## 2020-08-05 NOTE — TELEPHONE ENCOUNTER
Call to Senthil Montez's office to request approval for patient to start Topiramate for weight loss.  LM for office to call back with decision.

## 2020-08-05 NOTE — NURSING NOTE
"Chief Complaint   Patient presents with     RECHECK     Follow up appointment.       Vitals:    08/05/20 1102   Weight: 98.9 kg (218 lb)   Height: 1.64 m (5' 4.57\")       Body mass index is 36.76 kg/m .                            CHRISTINA MAURER, EMT    "

## 2020-08-05 NOTE — PATIENT INSTRUCTIONS
Varinder Holt,     It was nice to talk to you again!    Call for an appt at any [facility] lab to get labs checked. To find a lab location near you, please call [phone].    See dietitian soon.  Schedule should call you but if they don't reach you please call [phone] to schedule your next appointments    See [facility] pharmacist in 1 month to follow up medications.

## 2020-08-05 NOTE — PROGRESS NOTES
"Jonathon Broussard is a 40 year old female who is being evaluated via a billable telephone visit.      The patient has been notified of following:     \"This telephone visit will be conducted via a call between you and your physician/provider. We have found that certain health care needs can be provided without the need for a physical exam.  This service lets us provide the care you need with a short phone conversation.  If a prescription is necessary we can send it directly to your pharmacy.  If lab work is needed we can place an order for that and you can then stop by our lab to have the test done at a later time.    Telephone visits are billed at different rates depending on your insurance coverage. During this emergency period, for some insurers they may be billed the same as an in-person visit.  Please reach out to your insurance provider with any questions.    If during the course of the call the physician/provider feels a telephone visit is not appropriate, you will not be charged for this service.\"    Patient has given verbal consent for Telephone visit?  Yes    What phone number would you like to be contacted at? [phone]    How would you like to obtain your AVS? Mail a copy    Phone call duration: 11 minutes    Jennifer Sanchez PA-C         Return Medical Weight Management Note     Jonathon Broussard  MRN:  [id number]  :  1980  PO:  2020    Dear Primary Care Provider,    I had the pleasure of seeing your patient Jonathon Broussard. She is a 40 year old female who I am continuing to see for treatment of obesity related to:       2020   I have the following health issues associated with obesity: High Blood Pressure, Sleep Apnea, GERD (Reflux), Asthma   I have the following symptoms associated with obesity: None of the above       INTERVAL HISTORY:  New MWM consult 20 and discussed possible ozempic.  Hasn't been able to get labs checked yet to check A1C, plans to do soon  Wants " "to discuss other options.  Never tried topiramate.  She has lost 10 lbs by cutting down on snacks and soda.  No hx of kidney stones or glaucoma    CURRENT WEIGHT:   218 lbs 0 oz    Initial Weight (lbs): 228 lbs  Last Visits Weight: 103.4 kg (228 lb)  Cumulative weight loss (lbs): 10  Weight Loss Percentage: 4.39%    Changes and Difficulties 8/5/2020   I have made the following changes to my diet since my last visit: no changes   With regards to my diet, I am still struggling with: none   I have made the following changes to my activity/exercise since my last visit: walking on weekends wih boyfriend   With regards to my activity/exercise, I am still struggling with: exercise room closed in building, trying to get out to walk more       VITALS:  Ht 1.64 m (5' 4.57\")   Wt 98.9 kg (218 lb)   BMI 36.76 kg/m      MEDICATIONS:   Current Outpatient Medications   Medication Sig Dispense Refill     amLODIPine (NORVASC) 10 MG tablet TAKE 1 TABLET BY MOUTH EVERY DAY DX HIGH BLOOD PRESSURE  11     atomoxetine (STRATTERA) 100 MG capsule One cap QAM (with one 25mg cap for total of 125mg QAM). Take after food.       atomoxetine (STRATTERA) 25 MG capsule One cap QAM (with one 100mg cap for total of 125mg QAM). Take after food.       benztropine (COGENTIN) 0.5 MG tablet Take 0.5 mg by mouth       carvedilol (COREG) 12.5 MG tablet TAKE 3 TABLETS (37.5mg) BY MOUTH TWICE DAILY DX HIGH BLOOD PRESSURE  11     cholecalciferol (VITAMIN D-1000 MAX ST) 1000 units TABS Take 1 tablet by mouth every 24 hours       cloZAPine (CLOZARIL) 100 MG tablet 400mg QHS. (4 tabs QHS)       diazepam (VALIUM) 10 MG tablet TAKE 1 TAB BY MOUTH THREE TIMES DAILY.IF TOO SEDATING ,CAN TAKE 1 2 TAB (5MG)  4     diphenhydrAMINE HCl 50 MG TABS One tab QID prn.       GABAPENTIN PO Take 1,200 mg by mouth 2 times daily Two tablets twice daily       hydrocortisone (CORTAID) 1 % external cream        LANsoprazole (PREVACID) 30 MG DR capsule Take 1 capsule (30 mg) by " mouth every 24 hours 90 capsule 3     LEVOTHYROXINE SODIUM PO Take 112 mcg by mouth       lisinopril (PRINIVIL/ZESTRIL) 5 MG tablet TAKE 1 TAB BY MOUTH ONCE DAILY  11     LORazepam (ATIVAN) 2 MG tablet TAKE 1 TAB BY MOUTH THREE TIMES DAILY  4     Lubricants (SURGICAL LUBRICANT) external gel        LUBRICATING PLUS EYE DROPS 0.5 % SOLN ophthalmic solution INSTILL 1 DROP INTO EACH EYE FOUR TIMES DAILY  11     Multiple Vitamins-Minerals (CERTA-KONSTANTIN PO)        olopatadine (PATANOL) 0.1 % ophthalmic solution INSTILL 1 DROP TO BOTH EYES TWICE A DAY  5     oxybutynin (DITROPAN-XL) 10 MG 24 hr tablet TAKE 1 TAB BY MOUTH ONCE DAILY  99     Paliperidone (INVEGA PO) Take 9 mg by mouth       paliperidone (INVEGA SUSTENNA) 234 MG/1.5ML SUSP Inject 234 mg into the muscle       polyethylene glycol (MIRALAX) powder Take 17 g (1 capful) by mouth daily 510 g 11     SM NICOTINE POLACRILEX 2 MG gum CHEW 1 PIECE EVERY HOUR AS NEEDED FOR NICOTINE CRAVINGS  16     triamcinolone (KENALOG) 0.025 % cream APPLY TWICE DAILY TO RASH ON WRIST AND UNDER ARMPITS  1     VENLAFAXINE HCL PO Take 375 mg by mouth 2 times daily ER       VITAMIN D, CHOLECALCIFEROL, PO Take 2,000 Units by mouth daily          Weight Loss Medication History Reviewed With Patient 8/5/2020   Which weight loss medications are you currently taking on a regular basis?  None       ASSESSMENT/PLAN:  MWM follow up.  No meds yet, needs to get labs.  She is interested in ozempic but we need A1C check.      Call for an appt at any [place] lab to get labs checked. To find a lab location near you, please call [phone].  See dietitian soon.   should call you but if they don't reach you please call [phone] to schedule your next appointments  See [facility] pharmacist in 1 month to follow up medications.  Will call psychiatrist to ask about possibly using topiramate. Dr Senthil Montez       Sincerely,    Jennifer Sanchez PA-C

## 2020-08-05 NOTE — TELEPHONE ENCOUNTER
[facility] would like lab order for diabetes test which pt said was talked about today at appt  [phone], fax

## 2020-08-05 NOTE — LETTER
"2020       RE: Jonathon Broussard  [address]  [address]  [facility] [address]     Dear Colleague,    Thank you for referring your patient, Jonathon Broussard, to the [facility] SURGICAL WEIGHT MANAGEMENT at [facility]. Please see a copy of my visit note below.    Jonathon Broussard is a 40 year old female who is being evaluated via a billable telephone visit.        Phone call duration: 11 minutes    Jennifer Sanchez PA-C         Return Medical Weight Management Note     Jonathon Broussard  MRN:  [id number]  :  1980  PO:  2020    Dear Primary Care Provider,    I had the pleasure of seeing your patient Jonathon Broussard. She is a 40 year old female who I am continuing to see for treatment of obesity related to:       2020   I have the following health issues associated with obesity: High Blood Pressure, Sleep Apnea, GERD (Reflux), Asthma   I have the following symptoms associated with obesity: None of the above       INTERVAL HISTORY:  New MWM consult 20 and discussed possible ozempic.  Hasn't been able to get labs checked yet to check A1C, plans to do soon  Wants to discuss other options.  Never tried topiramate.  She has lost 10 lbs by cutting down on snacks and soda.  No hx of kidney stones or glaucoma    CURRENT WEIGHT:   218 lbs 0 oz    Initial Weight (lbs): 228 lbs  Last Visits Weight: 103.4 kg (228 lb)  Cumulative weight loss (lbs): 10  Weight Loss Percentage: 4.39%    Changes and Difficulties 2020   I have made the following changes to my diet since my last visit: no changes   With regards to my diet, I am still struggling with: none   I have made the following changes to my activity/exercise since my last visit: walking on weekends wih boyfriend   With regards to my activity/exercise, I am still struggling with: exercise room closed in building, trying to get out to walk more       VITALS:  Ht 1.64 m (5' 4.57\")   Wt 98.9 kg (218 lb)  "  BMI 36.76 kg/m      MEDICATIONS:   Current Outpatient Medications   Medication Sig Dispense Refill     amLODIPine (NORVASC) 10 MG tablet TAKE 1 TABLET BY MOUTH EVERY DAY DX HIGH BLOOD PRESSURE  11     atomoxetine (STRATTERA) 100 MG capsule One cap QAM (with one 25mg cap for total of 125mg QAM). Take after food.       atomoxetine (STRATTERA) 25 MG capsule One cap QAM (with one 100mg cap for total of 125mg QAM). Take after food.       benztropine (COGENTIN) 0.5 MG tablet Take 0.5 mg by mouth       carvedilol (COREG) 12.5 MG tablet TAKE 3 TABLETS (37.5mg) BY MOUTH TWICE DAILY DX HIGH BLOOD PRESSURE  11     cholecalciferol (VITAMIN D-1000 MAX ST) 1000 units TABS Take 1 tablet by mouth every 24 hours       cloZAPine (CLOZARIL) 100 MG tablet 400mg QHS. (4 tabs QHS)       diazepam (VALIUM) 10 MG tablet TAKE 1 TAB BY MOUTH THREE TIMES DAILY.IF TOO SEDATING ,CAN TAKE 1 2 TAB (5MG)  4     diphenhydrAMINE HCl 50 MG TABS One tab QID prn.       GABAPENTIN PO Take 1,200 mg by mouth 2 times daily Two tablets twice daily       hydrocortisone (CORTAID) 1 % external cream        LANsoprazole (PREVACID) 30 MG DR capsule Take 1 capsule (30 mg) by mouth every 24 hours 90 capsule 3     LEVOTHYROXINE SODIUM PO Take 112 mcg by mouth       lisinopril (PRINIVIL/ZESTRIL) 5 MG tablet TAKE 1 TAB BY MOUTH ONCE DAILY  11     LORazepam (ATIVAN) 2 MG tablet TAKE 1 TAB BY MOUTH THREE TIMES DAILY  4     Lubricants (SURGICAL LUBRICANT) external gel        LUBRICATING PLUS EYE DROPS 0.5 % SOLN ophthalmic solution INSTILL 1 DROP INTO EACH EYE FOUR TIMES DAILY  11     Multiple Vitamins-Minerals (CERTA-KONSTANTIN PO)        olopatadine (PATANOL) 0.1 % ophthalmic solution INSTILL 1 DROP TO BOTH EYES TWICE A DAY  5     oxybutynin (DITROPAN-XL) 10 MG 24 hr tablet TAKE 1 TAB BY MOUTH ONCE DAILY  99     Paliperidone (INVEGA PO) Take 9 mg by mouth       paliperidone (INVEGA SUSTENNA) 234 MG/1.5ML SUSP Inject 234 mg into the muscle       polyethylene glycol  (MIRALAX) powder Take 17 g (1 capful) by mouth daily 510 g 11     SM NICOTINE POLACRILEX 2 MG gum CHEW 1 PIECE EVERY HOUR AS NEEDED FOR NICOTINE CRAVINGS  16     triamcinolone (KENALOG) 0.025 % cream APPLY TWICE DAILY TO RASH ON WRIST AND UNDER ARMPITS  1     VENLAFAXINE HCL PO Take 375 mg by mouth 2 times daily ER       VITAMIN D, CHOLECALCIFEROL, PO Take 2,000 Units by mouth daily          Weight Loss Medication History Reviewed With Patient 8/5/2020   Which weight loss medications are you currently taking on a regular basis?  None       ASSESSMENT/PLAN:  MWM follow up.  No meds yet, needs to get labs.  She is interested in ozempic but we need A1C check.      Call for an appt at any [place] lab to get labs checked. To find a lab location near you, please call [phone].  See dietitian soon.   should call you but if they don't reach you please call [phone] to schedule your next appointments  See Roya DEL REAL pharmacist in 1 month to follow up medications.  Will call psychiatrist to ask about possibly using topiramate. Dr Senthil Montez       Sincerely,    Jennifer Sanchez PA-C

## 2020-08-14 ENCOUNTER — TELEPHONE (OUTPATIENT)
Dept: SURGERY | Facility: CLINIC | Age: 40
End: 2020-08-14

## 2020-08-14 NOTE — TELEPHONE ENCOUNTER
Pt has questions about a prescription Topiramate    [phone]  **ok to leave a detailed message

## 2020-08-17 NOTE — TELEPHONE ENCOUNTER
Called and left message for patient in regards to Topiramate prescription. Advised that a message was out to Senthil Montez's office to get approval to start medication. Gave contact center number for call back if patient has update on that or further questions.

## 2020-08-21 ENCOUNTER — TELEPHONE (OUTPATIENT)
Dept: ENDOCRINOLOGY | Facility: CLINIC | Age: 40
End: 2020-08-21

## 2020-08-21 NOTE — TELEPHONE ENCOUNTER
"Sorry in advance if not the most concise TE you get today, but this was left as a VM so...    Pt seems to be asking for Jennifer to sent a script (maybe refill order?)   For: \"Zoperamate'  To be faxed to her psychologist, 'Mary Montez\" at:  FX: [phone]    'and if he approves it, please use [facility] in [place]\".      Hope that makes some sense.  "

## 2020-08-26 ENCOUNTER — TELEPHONE (OUTPATIENT)
Dept: SURGERY | Facility: CLINIC | Age: 40
End: 2020-08-26

## 2020-08-26 NOTE — TELEPHONE ENCOUNTER
Not sure how she only gets our voicemail, but pt has called again about this issue. Can someone please call her soon?    [phone]  *OK to leave detailed VM**

## 2020-08-27 ENCOUNTER — TELEPHONE (OUTPATIENT)
Dept: SURGERY | Facility: CLINIC | Age: 40
End: 2020-08-27

## 2020-08-27 DIAGNOSIS — E66.812 CLASS 2 SEVERE OBESITY DUE TO EXCESS CALORIES WITH SERIOUS COMORBIDITY AND BODY MASS INDEX (BMI) OF 38.0 TO 38.9 IN ADULT (H): Primary | ICD-10-CM

## 2020-08-27 DIAGNOSIS — E66.01 CLASS 2 SEVERE OBESITY DUE TO EXCESS CALORIES WITH SERIOUS COMORBIDITY AND BODY MASS INDEX (BMI) OF 38.0 TO 38.9 IN ADULT (H): Primary | ICD-10-CM

## 2020-08-27 RX ORDER — TOPIRAMATE 25 MG/1
TABLET, FILM COATED ORAL
Qty: 90 TABLET | Refills: 1 | Status: SHIPPED | OUTPATIENT
Start: 2020-08-27 | End: 2020-09-23

## 2020-08-27 NOTE — PATIENT INSTRUCTIONS
MEDICATION STARTED AT THIS APPOINTMENT  We are starting topiramate at bedtime.  Start one tab, 25 mg, for a week. Go up to 50 mg (2 tabs) for the next week. At the third week, take   3 tabs (75 mg).  Stay at 3 tabs until you are seen again. Call the nurse at [phone] if you have any questions or concerns. (Do not stop taking it if you don't think it's working. For some people it works even though they do not feel much different.)    Topiramate (Topamax) is a medication that is used most often to treat migraine headaches or for seizures. It has also been found to help with weight loss. Although it's not currently FDA approved for weight loss, it has been used safely for a number of years to help people who are carrying extra weight.     Just how topiramate helps with weight loss has not been exactly determined. However it seems to work on areas of the brain to quiet down signals related to eating.      Topiramate may make you:    >feel less interest in eating in between meals   >think less about food and eating   >find it easier to push the plate away   >find giving up pop easier    >have an easier time eating less    For some of our patients, the pills work right away. They feel and think quite differently about food. Other patients don't feel much of a change but find in fact they have lost weight! Like all weight loss medications, topiramate works best when you help it work.  This means:    1) Have less tempting high calorie (fattening) food around the house or office    2) Have lower calorie food (fruits, vegetables,low fat meats and dairy) for snacks    3) Eat out only one time or less each week.   4) Eat your meals at a table with the TV or computer off.    Side-effects. Topiramate is generally well tolerated. The main side-effects we see are:   Tingling in hands,feet, or face (usually not very troublesome)   Mental confusion and word finding trouble (about 10% of patients have this.)     Feeling sleepy  or a bit dopey- this goes away very soon after starting.    One of the dangers of topiramate is the possibility of birth defects--if you get pregnant when you are on it, there is the risk that your baby will be born with a cleft lip or palate.  If you are on topiramate and of child bearing age, you need to be on a reliable form of birth control or refrain from sexual intercourse.     Please refer to the pharmacy insert for more information on side-effects. Since many pharmacists are not familiar with the use of topiramate in weight loss, calling the clinic will get you the most accurate information on the use of this medication for weight loss.     In order to get refills of this or any medication we prescribe you must be seen in the medical weight mgmt clinic every 2-3 months. Please have your pharmacy fax a refill request to [phone].

## 2020-08-27 NOTE — TELEPHONE ENCOUNTER
Dr. Senthil Montez is okay with patient starting on Topiramate.  Information relayed to Jennifer Sanchez PA-C for approval.

## 2020-09-02 ENCOUNTER — VIRTUAL VISIT (OUTPATIENT)
Dept: SURGERY | Facility: CLINIC | Age: 40
End: 2020-09-02
Payer: MEDICAID

## 2020-09-02 DIAGNOSIS — E66.812 CLASS 2 SEVERE OBESITY DUE TO EXCESS CALORIES WITH SERIOUS COMORBIDITY AND BODY MASS INDEX (BMI) OF 38.0 TO 38.9 IN ADULT (H): Primary | ICD-10-CM

## 2020-09-02 DIAGNOSIS — E66.01 CLASS 2 SEVERE OBESITY DUE TO EXCESS CALORIES WITH SERIOUS COMORBIDITY AND BODY MASS INDEX (BMI) OF 38.0 TO 38.9 IN ADULT (H): Primary | ICD-10-CM

## 2020-09-02 NOTE — PATIENT INSTRUCTIONS
Nutrition Goals  1) Increase fruits and vegetable   - Ask the kitchen to send meals with more vegetables than past or potatoes   - Try to have a piece of fruit for breakfast or as a snack instead of chips   2) Decrease soda intake by 50%  3) Decrease Chocolate intake by 50%   4) continues to go for walks on the weekends.     Initial Consult / Weight Loss    My Plate Planner_English - Pt Education  [email or web address]    Protein Sources for Weight Loss  [email or web address]     Carbohydrates  [email or web address]     Follow up with RD in one month    Alis Ascencio, MS, RD, LD  If you need to schedule or reschedule with a dietitian please call [phone].

## 2020-09-02 NOTE — PROGRESS NOTES
"Jonathon Broussard is a 40 year old female who is being evaluated via a billable telephone visit.      The patient has been notified of following:     \"This telephone visit will be conducted via a call between you and your physician/provider. We have found that certain health care needs can be provided without the need for a physical exam.  This service lets us provide the care you need with a short phone conversation.  If a prescription is necessary we can send it directly to your pharmacy.  If lab work is needed we can place an order for that and you can then stop by our lab to have the test done at a later time.    Telephone visits are billed at different rates depending on your insurance coverage. During this emergency period, for some insurers they may be billed the same as an in-person visit.  Please reach out to your insurance provider with any questions.    If during the course of the call the physician/provider feels a telephone visit is not appropriate, you will not be charged for this service.\"    Patient has given verbal consent for Telephone visit?  Yes    What phone number would you like to be contacted at? [phone]    How would you like to obtain your AVS? [employer]    Phone call duration: 21 minutes      New Weight Management Nutrition Consultation    Jonathon Broussard is a 40 year old female presents today for new weight management nutrition consultation.  Patient referred by KRAIG Zepeda on September 2, 2020.    Patient with Co-morbidities of obesity including:  Type II DM no  Renal Failure no  Sleep apnea yes  Hypertension yes   Dyslipidemia no  Joint pain no  Back pain no  GERD yes     Anthropometrics:  Estimated body mass index is 36.76 kg/m  as calculated from the following:    Height as of 8/5/20: 1.64 m (5' 4.57\").    Weight as of 8/5/20: 98.9 kg (218 lb).    Medications for Weight Loss:  Topamax    NUTRITION HISTORY  See PA note for further details.    Get meals delivered to her " apartment, from her apartment complex. She does not choose her meals.     Recent food recall:  Breakfast: skip   Lunch: tuna salad   Dinner: meat and pasta/potatoes  Snacks: chips, chocolate  Beverages: Milk (1%) and water, regular pop    Alcohol: None  Dining out: None    Physical Activity:  Walking the week    MALNUTRITION  Telephone visit: NFPE unable to be performed at this time.    % Intake: No decreased intake noted  % Weight Loss: None noted  Subcutaneous Fat Loss: Unable to assess  Muscle Loss: Unable to assess  Fluid Accumulation/Edema: Unable to assess  Malnutrition Diagnosis: Unable to determine due to inability to perform NFPE    Nutrition Prescription  Recommended energy/nutrient modification.     Nutrition Diagnosis  Obesity r/t long history of self-monitoring deficit and excessive energy intake aeb BMI >30.    Nutrition Intervention  Materials/education provided on Volumetric eating to help satiety level on fewer calories; portion control and healthy food choices, recommended decreasing soda intake, dicussed increasing fruits and vegetable intake.     Patient Understanding: good  Expected Compliance: fair-good  Follow-Up Plans: the plate method      Nutrition Goals  1) Increase fruits and vegetable   - Ask the kitchen to send meals with more vegetables than past or potatoes   - Try to have a piece of fruit for breakfast or as a snack instead of chips   2) Decrease soda intake by 50%  3) Decrease Chocolate intake by 50%   4) continues to go for walks on the weekends.     Initial Consult / Weight Loss    My Plate Planner_English - Pt Education  [email or web address]    Protein Sources for Weight Loss  [email or web address]     Carbohydrates  [email or web address]       Follow-Up:4-6 weeks     Time spent with patient: 21 minutes.  Alis Ascencio, MS, RD, LD

## 2020-09-02 NOTE — LETTER
"9/2/2020       RE: Jonathon Broussard  [address]  [address]  [facility] [address]     Dear Colleague,    Thank you for referring your patient, Jonathon Broussard, to the [facility] SURGICAL WEIGHT MANAGEMENT at [facility]. Please see a copy of my visit note below.    Jonathon Broussard is a 40 year old female who is being evaluated via a billable telephone visit.      The patient has been notified of following:     \"This telephone visit will be conducted via a call between you and your physician/provider. We have found that certain health care needs can be provided without the need for a physical exam.  This service lets us provide the care you need with a short phone conversation.  If a prescription is necessary we can send it directly to your pharmacy.  If lab work is needed we can place an order for that and you can then stop by our lab to have the test done at a later time.    Telephone visits are billed at different rates depending on your insurance coverage. During this emergency period, for some insurers they may be billed the same as an in-person visit.  Please reach out to your insurance provider with any questions.    If during the course of the call the physician/provider feels a telephone visit is not appropriate, you will not be charged for this service.\"    Patient has given verbal consent for Telephone visit?  Yes    What phone number would you like to be contacted at? [phone]    How would you like to obtain your AVS? [employer]    Phone call duration: 21 minutes      New Weight Management Nutrition Consultation    Jonathon Broussard is a 40 year old female presents today for new weight management nutrition consultation.  Patient referred by KRAIG Zepeda on September 2, 2020.    Patient with Co-morbidities of obesity including:  Type II DM no  Renal Failure no  Sleep apnea yes  Hypertension yes   Dyslipidemia no  Joint pain no  Back pain no  GERD yes " "    Anthropometrics:  Estimated body mass index is 36.76 kg/m  as calculated from the following:    Height as of 8/5/20: 1.64 m (5' 4.57\").    Weight as of 8/5/20: 98.9 kg (218 lb).    Medications for Weight Loss:  Topamax    NUTRITION HISTORY  See PA note for further details.    Get meals delivered to her apartment, from her apartment complex. She does not choose her meals.     Recent food recall:  Breakfast: skip   Lunch: tuna salad   Dinner: meat and pasta/potatoes  Snacks: chips, chocolate  Beverages: Milk (1%) and water, regular pop    Alcohol: None  Dining out: None    Physical Activity:  Walking the week    MALNUTRITION  Telephone visit: NFPE unable to be performed at this time.    % Intake: No decreased intake noted  % Weight Loss: None noted  Subcutaneous Fat Loss: Unable to assess  Muscle Loss: Unable to assess  Fluid Accumulation/Edema: Unable to assess  Malnutrition Diagnosis: Unable to determine due to inability to perform NFPE    Nutrition Prescription  Recommended energy/nutrient modification.     Nutrition Diagnosis  Obesity r/t long history of self-monitoring deficit and excessive energy intake aeb BMI >30.    Nutrition Intervention  Materials/education provided on Volumetric eating to help satiety level on fewer calories; portion control and healthy food choices, recommended decreasing soda intake, dicussed increasing fruits and vegetable intake.     Patient Understanding: good  Expected Compliance: fair-good  Follow-Up Plans: the plate method      Nutrition Goals  1) Increase fruits and vegetable   - Ask the kitchen to send meals with more vegetables than past or potatoes   - Try to have a piece of fruit for breakfast or as a snack instead of chips   2) Decrease soda intake by 50%  3) Decrease Chocolate intake by 50%   4) continues to go for walks on the weekends.     Initial Consult / Weight Loss    My Plate Planner_English - Pt Education  [email or web address]    Protein Sources " for Weight Loss  [email or web address]     Carbohydrates  [email or web address]       Follow-Up:4-6 weeks     Time spent with patient: 21 minutes.  Alis Ascencio MS, RD, LD            Again, thank you for allowing me to participate in the care of your patient.      Sincerely,    Alis Ascencio RD

## 2020-09-02 NOTE — LETTER
"9/2/2020       RE: Jonathon Broussard  [address]  [address]  [facility] [address]     Dear Colleague,    Thank you for referring your patient, Jonathon Broussard, to the [facility] SURGICAL WEIGHT MANAGEMENT at [facility]. Please see a copy of my visit note below.    New Weight Management Nutrition Consultation    Jonathon Broussard is a 40 year old female presents today for new weight management nutrition consultation.  Patient referred by KRAIG Zepeda on September 2, 2020.    Patient with Co-morbidities of obesity including:  Type II DM no  Renal Failure no  Sleep apnea yes  Hypertension yes   Dyslipidemia no  Joint pain no  Back pain no  GERD yes     Anthropometrics:  Estimated body mass index is 36.76 kg/m  as calculated from the following:    Height as of 8/5/20: 1.64 m (5' 4.57\").    Weight as of 8/5/20: 98.9 kg (218 lb).    Medications for Weight Loss:  Topamax    NUTRITION HISTORY  See PA note for further details.    Get meals delivered to her apartment, from her apartment complex. She does not choose her meals.     Recent food recall:  Breakfast: skip   Lunch: tuna salad   Dinner: meat and pasta/potatoes  Snacks: chips, chocolate  Beverages: Milk (1%) and water, regular pop    Alcohol: None  Dining out: None    Physical Activity:  Walking the week    MALNUTRITION  Telephone visit: NFPE unable to be performed at this time.    % Intake: No decreased intake noted  % Weight Loss: None noted  Subcutaneous Fat Loss: Unable to assess  Muscle Loss: Unable to assess  Fluid Accumulation/Edema: Unable to assess  Malnutrition Diagnosis: Unable to determine due to inability to perform NFPE    Nutrition Prescription  Recommended energy/nutrient modification.     Nutrition Diagnosis  Obesity r/t long history of self-monitoring deficit and excessive energy intake aeb BMI >30.    Nutrition Intervention  Materials/education provided on Volumetric eating to help satiety level on " fewer calories; portion control and healthy food choices, recommended decreasing soda intake, dicussed increasing fruits and vegetable intake.     Patient Understanding: good  Expected Compliance: fair-good  Follow-Up Plans: the plate method      Nutrition Goals  1) Increase fruits and vegetable   - Ask the kitchen to send meals with more vegetables than past or potatoes   - Try to have a piece of fruit for breakfast or as a snack instead of chips   2) Decrease soda intake by 50%  3) Decrease Chocolate intake by 50%   4) continues to go for walks on the weekends.     Initial Consult / Weight Loss    My Plate Planner_English - Pt Education  [email or web address]    Protein Sources for Weight Loss  [email or web address]     Carbohydrates  [email or web address]       Follow-Up:4-6 weeks     Time spent with patient: 21 minutes.      Again, thank you for allowing me to participate in the care of your patient.  Sincerely,    Alis Ascencio, MS, RD, LD

## 2020-09-08 ENCOUNTER — TRANSFERRED RECORDS (OUTPATIENT)
Dept: HEALTH INFORMATION MANAGEMENT | Facility: CLINIC | Age: 40
End: 2020-09-08

## 2020-09-23 ENCOUNTER — TELEPHONE (OUTPATIENT)
Dept: SURGERY | Facility: CLINIC | Age: 40
End: 2020-09-23

## 2020-09-23 ENCOUNTER — VIRTUAL VISIT (OUTPATIENT)
Dept: SURGERY | Facility: CLINIC | Age: 40
End: 2020-09-23
Payer: MEDICAID

## 2020-09-23 VITALS — BODY MASS INDEX: 36.32 KG/M2 | HEIGHT: 65 IN | WEIGHT: 218 LBS

## 2020-09-23 DIAGNOSIS — E66.812 CLASS 2 SEVERE OBESITY DUE TO EXCESS CALORIES WITH SERIOUS COMORBIDITY AND BODY MASS INDEX (BMI) OF 38.0 TO 38.9 IN ADULT (H): ICD-10-CM

## 2020-09-23 DIAGNOSIS — E66.01 CLASS 2 SEVERE OBESITY DUE TO EXCESS CALORIES WITH SERIOUS COMORBIDITY AND BODY MASS INDEX (BMI) OF 38.0 TO 38.9 IN ADULT (H): ICD-10-CM

## 2020-09-23 RX ORDER — TOPIRAMATE 25 MG/1
50 TABLET, FILM COATED ORAL 2 TIMES DAILY
Qty: 120 TABLET | Refills: 3 | Status: SHIPPED | OUTPATIENT
Start: 2020-09-23 | End: 2020-10-29

## 2020-09-23 ASSESSMENT — MIFFLIN-ST. JEOR: SCORE: 1652.84

## 2020-09-23 ASSESSMENT — PAIN SCALES - GENERAL: PAINLEVEL: NO PAIN (0)

## 2020-09-23 NOTE — Clinical Note
Can you please send AVS to pt by mail? Can you also call to schedule one month with me by phone.  Thanks

## 2020-09-23 NOTE — NURSING NOTE
"(   Chief Complaint   Patient presents with     Follow Up     7 week weight management follow up.    )    ( Weight: 98.9 kg (218 lb)(Pt reported) )  ( Height: 164 cm (5' 4.57\") )  ( BMI (Calculated): 36.76 )  (   )  ( Cumulative weight loss (lbs): 10 )  ( Last Visits Weight: 98.9 kg (218 lb)(Pt reported) )  ( Wt change since last visit (lbs): 0 )  (   )  (   )    (   )  (   )  (   )  (   )  (   )  (   )  (   )    (   Patient Active Problem List   Diagnosis     Bipolar I disorder, most recent episode (or current) unspecified     Cannabis dependence in remission (H)     Herpes simplex virus (HSV) infection     Arthritis     Juvenile chronic arthritis (H)     HTN (hypertension)     Heartburn     CARDIOVASCULAR SCREENING; LDL GOAL LESS THAN 130     Gallstones    )  (   Current Outpatient Medications   Medication Sig Dispense Refill     amLODIPine (NORVASC) 10 MG tablet TAKE 1 TABLET BY MOUTH EVERY DAY DX HIGH BLOOD PRESSURE  11     atomoxetine (STRATTERA) 100 MG capsule One cap QAM (with one 25mg cap for total of 125mg QAM). Take after food.       atomoxetine (STRATTERA) 25 MG capsule One cap QAM (with one 100mg cap for total of 125mg QAM). Take after food.       benztropine (COGENTIN) 0.5 MG tablet Take 0.5 mg by mouth       carvedilol (COREG) 12.5 MG tablet TAKE 3 TABLETS (37.5mg) BY MOUTH TWICE DAILY DX HIGH BLOOD PRESSURE  11     cholecalciferol (VITAMIN D-1000 MAX ST) 1000 units TABS Take 1 tablet by mouth every 24 hours       cloZAPine (CLOZARIL) 100 MG tablet 400mg QHS. (4 tabs QHS)       diazepam (VALIUM) 10 MG tablet TAKE 1 TAB BY MOUTH THREE TIMES DAILY.IF TOO SEDATING ,CAN TAKE 1 2 TAB (5MG)  4     diphenhydrAMINE HCl 50 MG TABS One tab QID prn.       GABAPENTIN PO Take 1,200 mg by mouth 2 times daily Two tablets twice daily       hydrocortisone (CORTAID) 1 % external cream        LANsoprazole (PREVACID) 30 MG DR capsule Take 1 capsule (30 mg) by mouth every 24 hours 90 capsule 3     LEVOTHYROXINE SODIUM PO " Take 112 mcg by mouth       lisinopril (PRINIVIL/ZESTRIL) 5 MG tablet TAKE 1 TAB BY MOUTH ONCE DAILY  11     LORazepam (ATIVAN) 2 MG tablet TAKE 1 TAB BY MOUTH THREE TIMES DAILY  4     Lubricants (SURGICAL LUBRICANT) external gel        LUBRICATING PLUS EYE DROPS 0.5 % SOLN ophthalmic solution INSTILL 1 DROP INTO EACH EYE FOUR TIMES DAILY  11     Multiple Vitamins-Minerals (CERTA-KONSTANTIN PO)        olopatadine (PATANOL) 0.1 % ophthalmic solution INSTILL 1 DROP TO BOTH EYES TWICE A DAY  5     oxybutynin (DITROPAN-XL) 10 MG 24 hr tablet TAKE 1 TAB BY MOUTH ONCE DAILY  99     Paliperidone (INVEGA PO) Take 9 mg by mouth       paliperidone (INVEGA SUSTENNA) 234 MG/1.5ML SUSP Inject 234 mg into the muscle       polyethylene glycol (MIRALAX) powder Take 17 g (1 capful) by mouth daily 510 g 11     SM NICOTINE POLACRILEX 2 MG gum CHEW 1 PIECE EVERY HOUR AS NEEDED FOR NICOTINE CRAVINGS  16     topiramate (TOPAMAX) 25 MG tablet 25mg at bedtime for week 1, 50mg at bedtime for 1 week, and 75mg at bedtime thereafter 90 tablet 1     triamcinolone (KENALOG) 0.025 % cream APPLY TWICE DAILY TO RASH ON WRIST AND UNDER ARMPITS  1     VENLAFAXINE HCL PO Take 375 mg by mouth 2 times daily ER       VITAMIN D, CHOLECALCIFEROL, PO Take 2,000 Units by mouth daily       )  ( Diabetes Eval:    )    ( Pain Eval:  No Pain (0) )    ( Wound Eval:       )    (   History   Smoking Status     Former Smoker     Years: 8.00     Types: Cigarettes     Start date: 1/1/1998     Quit date: 12/1/2018   Smokeless Tobacco     Former User     Comment: 3/4 ppd/E CIG    )    ( Signed By:  Annemarie Rivers CMA; September 23, 2020; 12:41 PM )

## 2020-09-23 NOTE — LETTER
"2020       RE: Jonathon Broussard  [address]  [address]  [facility] [address]     Dear Colleague,    Thank you for referring your patient, Jonathon Broussard, to the [facility] SURGICAL WEIGHT MANAGEMENT at [facility]. Please see a copy of my visit note below.  Return Medical Weight Management Note     Jonathon Broussard  MRN:  [id number]  :  1980  PO:  2020    Dear No primary care provider on file.,    I had the pleasure of seeing your patient Jonathon Broussard. She is a 40 year old female who I am continuing to see for treatment of obesity related to:       2020   I have the following health issues associated with obesity: High Blood Pressure, Sleep Apnea, GERD (Reflux), Asthma   I have the following symptoms associated with obesity: None of the above       INTERVAL HISTORY:  Last seen 20 and started topiramate, approved on psychiatrist Senthil Montez  She is taking topiramate 75mg   No mood changes, mood good recently      CURRENT WEIGHT:   218 lbs 0 oz    Initial Weight (lbs): 228 lbs  Last Visits Weight: 98.9 kg (218 lb)(Pt reported)  Cumulative weight loss (lbs): 10  Weight Loss Percentage: 4.39%    Changes and Difficulties 2020   I have made the following changes to my diet since my last visit: no changes   With regards to my diet, I am still struggling with: none   I have made the following changes to my activity/exercise since my last visit: walking on weekends wih boyfriend   With regards to my activity/exercise, I am still struggling with: exercise room closed in building, trying to get out to walk more     VITALS:  Ht 1.64 m (5' 4.57\")   Wt 98.9 kg (218 lb)   BMI 36.77 kg/m      MEDICATIONS:   Current Outpatient Medications   Medication Sig Dispense Refill     amLODIPine (NORVASC) 10 MG tablet TAKE 1 TABLET BY MOUTH EVERY DAY DX HIGH BLOOD PRESSURE  11     atomoxetine (STRATTERA) 100 MG capsule One cap QAM (with one 25mg cap for total " of 125mg QAM). Take after food.       atomoxetine (STRATTERA) 25 MG capsule One cap QAM (with one 100mg cap for total of 125mg QAM). Take after food.       benztropine (COGENTIN) 0.5 MG tablet Take 0.5 mg by mouth       carvedilol (COREG) 12.5 MG tablet TAKE 3 TABLETS (37.5mg) BY MOUTH TWICE DAILY DX HIGH BLOOD PRESSURE  11     cholecalciferol (VITAMIN D-1000 MAX ST) 1000 units TABS Take 1 tablet by mouth every 24 hours       cloZAPine (CLOZARIL) 100 MG tablet 400mg QHS. (4 tabs QHS)       diazepam (VALIUM) 10 MG tablet TAKE 1 TAB BY MOUTH THREE TIMES DAILY.IF TOO SEDATING ,CAN TAKE 1 2 TAB (5MG)  4     diphenhydrAMINE HCl 50 MG TABS One tab QID prn.       GABAPENTIN PO Take 1,200 mg by mouth 2 times daily Two tablets twice daily       hydrocortisone (CORTAID) 1 % external cream        LANsoprazole (PREVACID) 30 MG DR capsule Take 1 capsule (30 mg) by mouth every 24 hours 90 capsule 3     LEVOTHYROXINE SODIUM PO Take 112 mcg by mouth       lisinopril (PRINIVIL/ZESTRIL) 5 MG tablet TAKE 1 TAB BY MOUTH ONCE DAILY  11     LORazepam (ATIVAN) 2 MG tablet TAKE 1 TAB BY MOUTH THREE TIMES DAILY  4     Lubricants (SURGICAL LUBRICANT) external gel        LUBRICATING PLUS EYE DROPS 0.5 % SOLN ophthalmic solution INSTILL 1 DROP INTO EACH EYE FOUR TIMES DAILY  11     Multiple Vitamins-Minerals (CERTA-KONSTANTIN PO)        olopatadine (PATANOL) 0.1 % ophthalmic solution INSTILL 1 DROP TO BOTH EYES TWICE A DAY  5     oxybutynin (DITROPAN-XL) 10 MG 24 hr tablet TAKE 1 TAB BY MOUTH ONCE DAILY  99     Paliperidone (INVEGA PO) Take 9 mg by mouth       paliperidone (INVEGA SUSTENNA) 234 MG/1.5ML SUSP Inject 234 mg into the muscle       polyethylene glycol (MIRALAX) powder Take 17 g (1 capful) by mouth daily 510 g 11     SM NICOTINE POLACRILEX 2 MG gum CHEW 1 PIECE EVERY HOUR AS NEEDED FOR NICOTINE CRAVINGS  16     topiramate (TOPAMAX) 25 MG tablet 25mg at bedtime for week 1, 50mg at bedtime for 1 week, and 75mg at bedtime thereafter 90  tablet 1     triamcinolone (KENALOG) 0.025 % cream APPLY TWICE DAILY TO RASH ON WRIST AND UNDER ARMPITS  1     VENLAFAXINE HCL PO Take 375 mg by mouth 2 times daily ER       VITAMIN D, CHOLECALCIFEROL, PO Take 2,000 Units by mouth daily          Weight Loss Medication History Reviewed With Patient 8/5/2020   Which weight loss medications are you currently taking on a regular basis?  None     ASSESSMENT/PLAN: MWM follow up doing well on topiramate 75mg daily and wants to increase.  She feels it has started to help and she is tolerating it well.    Increase topiramate to 50mg twice daily    FOLLOW-UP:    1-2 months     Again, thank you for allowing me to participate in the care of your patient.  Sincerely,    Jennifer Sanchez PA-C

## 2020-09-23 NOTE — PROGRESS NOTES
"Jonathon Broussard is a 40 year old female who is being evaluated via a billable telephone visit.      The patient has been notified of following:     \"This telephone visit will be conducted via a call between you and your physician/provider. We have found that certain health care needs can be provided without the need for a physical exam.  This service lets us provide the care you need with a short phone conversation.  If a prescription is necessary we can send it directly to your pharmacy.  If lab work is needed we can place an order for that and you can then stop by our lab to have the test done at a later time.    Telephone visits are billed at different rates depending on your insurance coverage. During this emergency period, for some insurers they may be billed the same as an in-person visit.  Please reach out to your insurance provider with any questions.    If during the course of the call the physician/provider feels a telephone visit is not appropriate, you will not be charged for this service.\"    Patient has given verbal consent for Telephone visit?  Yes    What phone number would you like to be contacted at? [phone]    How would you like to obtain your AVS? Mail a copy    Phone call duration: 5 minutes    Jennifer Sanchez PA-C        Return Medical Weight Management Note     Jonathon Broussard  MRN:  [id number]  :  1980  PO:  2020    Dear No primary care provider on file.,    I had the pleasure of seeing your patient Jonathon Broussard. She is a 40 year old female who I am continuing to see for treatment of obesity related to:       2020   I have the following health issues associated with obesity: High Blood Pressure, Sleep Apnea, GERD (Reflux), Asthma   I have the following symptoms associated with obesity: None of the above       INTERVAL HISTORY:  Last seen 20 and started topiramate, approved on psychiatrist Senthil Montez  She is taking topiramate 75mg   No mood " "changes, mood good recently      CURRENT WEIGHT:   218 lbs 0 oz    Initial Weight (lbs): 228 lbs  Last Visits Weight: 98.9 kg (218 lb)(Pt reported)  Cumulative weight loss (lbs): 10  Weight Loss Percentage: 4.39%    Changes and Difficulties 8/5/2020   I have made the following changes to my diet since my last visit: no changes   With regards to my diet, I am still struggling with: none   I have made the following changes to my activity/exercise since my last visit: walking on weekends wih boyfriend   With regards to my activity/exercise, I am still struggling with: exercise room closed in building, trying to get out to walk more       VITALS:  Ht 1.64 m (5' 4.57\")   Wt 98.9 kg (218 lb)   BMI 36.77 kg/m      MEDICATIONS:   Current Outpatient Medications   Medication Sig Dispense Refill     amLODIPine (NORVASC) 10 MG tablet TAKE 1 TABLET BY MOUTH EVERY DAY DX HIGH BLOOD PRESSURE  11     atomoxetine (STRATTERA) 100 MG capsule One cap QAM (with one 25mg cap for total of 125mg QAM). Take after food.       atomoxetine (STRATTERA) 25 MG capsule One cap QAM (with one 100mg cap for total of 125mg QAM). Take after food.       benztropine (COGENTIN) 0.5 MG tablet Take 0.5 mg by mouth       carvedilol (COREG) 12.5 MG tablet TAKE 3 TABLETS (37.5mg) BY MOUTH TWICE DAILY DX HIGH BLOOD PRESSURE  11     cholecalciferol (VITAMIN D-1000 MAX ST) 1000 units TABS Take 1 tablet by mouth every 24 hours       cloZAPine (CLOZARIL) 100 MG tablet 400mg QHS. (4 tabs QHS)       diazepam (VALIUM) 10 MG tablet TAKE 1 TAB BY MOUTH THREE TIMES DAILY.IF TOO SEDATING ,CAN TAKE 1 2 TAB (5MG)  4     diphenhydrAMINE HCl 50 MG TABS One tab QID prn.       GABAPENTIN PO Take 1,200 mg by mouth 2 times daily Two tablets twice daily       hydrocortisone (CORTAID) 1 % external cream        LANsoprazole (PREVACID) 30 MG DR capsule Take 1 capsule (30 mg) by mouth every 24 hours 90 capsule 3     LEVOTHYROXINE SODIUM PO Take 112 mcg by mouth       lisinopril " (PRINIVIL/ZESTRIL) 5 MG tablet TAKE 1 TAB BY MOUTH ONCE DAILY  11     LORazepam (ATIVAN) 2 MG tablet TAKE 1 TAB BY MOUTH THREE TIMES DAILY  4     Lubricants (SURGICAL LUBRICANT) external gel        LUBRICATING PLUS EYE DROPS 0.5 % SOLN ophthalmic solution INSTILL 1 DROP INTO EACH EYE FOUR TIMES DAILY  11     Multiple Vitamins-Minerals (CERTA-KONSTANTIN PO)        olopatadine (PATANOL) 0.1 % ophthalmic solution INSTILL 1 DROP TO BOTH EYES TWICE A DAY  5     oxybutynin (DITROPAN-XL) 10 MG 24 hr tablet TAKE 1 TAB BY MOUTH ONCE DAILY  99     Paliperidone (INVEGA PO) Take 9 mg by mouth       paliperidone (INVEGA SUSTENNA) 234 MG/1.5ML SUSP Inject 234 mg into the muscle       polyethylene glycol (MIRALAX) powder Take 17 g (1 capful) by mouth daily 510 g 11     SM NICOTINE POLACRILEX 2 MG gum CHEW 1 PIECE EVERY HOUR AS NEEDED FOR NICOTINE CRAVINGS  16     topiramate (TOPAMAX) 25 MG tablet 25mg at bedtime for week 1, 50mg at bedtime for 1 week, and 75mg at bedtime thereafter 90 tablet 1     triamcinolone (KENALOG) 0.025 % cream APPLY TWICE DAILY TO RASH ON WRIST AND UNDER ARMPITS  1     VENLAFAXINE HCL PO Take 375 mg by mouth 2 times daily ER       VITAMIN D, CHOLECALCIFEROL, PO Take 2,000 Units by mouth daily          Weight Loss Medication History Reviewed With Patient 8/5/2020   Which weight loss medications are you currently taking on a regular basis?  None       ASSESSMENT/PLAN: MWM follow up doing well on topiramate 75mg daily and wants to increase.  She feels it has started to help and she is tolerating it well.    Increase topiramate to 50mg twice daily      FOLLOW-UP:    1-2 months       Sincerely,    Jennifer Sanchez PA-C

## 2020-09-23 NOTE — LETTER
"2020       RE: Jonathon Broussard  [address]  [address]  [facility] [address]     Dear Colleague,    Thank you for referring your patient, Jonathon Broussard, to the [facility] SURGICAL WEIGHT MANAGEMENT at [facility]. Please see a copy of my visit note below.    Jonathon Broussard is a 40 year old female who is being evaluated via a billable telephone visit.      The patient has been notified of following:     \"This telephone visit will be conducted via a call between you and your physician/provider. We have found that certain health care needs can be provided without the need for a physical exam.  This service lets us provide the care you need with a short phone conversation.  If a prescription is necessary we can send it directly to your pharmacy.  If lab work is needed we can place an order for that and you can then stop by our lab to have the test done at a later time.    Telephone visits are billed at different rates depending on your insurance coverage. During this emergency period, for some insurers they may be billed the same as an in-person visit.  Please reach out to your insurance provider with any questions.    If during the course of the call the physician/provider feels a telephone visit is not appropriate, you will not be charged for this service.\"    Patient has given verbal consent for Telephone visit?  Yes    What phone number would you like to be contacted at? [phone]    How would you like to obtain your AVS? Mail a copy    Phone call duration: 5 minutes    Jennifer Sanchez PA-C        Return Medical Weight Management Note     Jonathon Broussard  MRN:  [id number]  :  1980  PO:  2020    Dear No primary care provider on file.,    I had the pleasure of seeing your patient Jonathon Broussard. She is a 40 year old female who I am continuing to see for treatment of obesity related to:       2020   I have the following health issues " "associated with obesity: High Blood Pressure, Sleep Apnea, GERD (Reflux), Asthma   I have the following symptoms associated with obesity: None of the above       INTERVAL HISTORY:  Last seen 8/5/20 and started topiramate, approved on psychiatrist Senthil Montez  She is taking topiramate 75mg   No mood changes, mood good recently      CURRENT WEIGHT:   218 lbs 0 oz    Initial Weight (lbs): 228 lbs  Last Visits Weight: 98.9 kg (218 lb)(Pt reported)  Cumulative weight loss (lbs): 10  Weight Loss Percentage: 4.39%    Changes and Difficulties 8/5/2020   I have made the following changes to my diet since my last visit: no changes   With regards to my diet, I am still struggling with: none   I have made the following changes to my activity/exercise since my last visit: walking on weekends wih boyfriend   With regards to my activity/exercise, I am still struggling with: exercise room closed in building, trying to get out to walk more       VITALS:  Ht 1.64 m (5' 4.57\")   Wt 98.9 kg (218 lb)   BMI 36.77 kg/m      MEDICATIONS:   Current Outpatient Medications   Medication Sig Dispense Refill     amLODIPine (NORVASC) 10 MG tablet TAKE 1 TABLET BY MOUTH EVERY DAY DX HIGH BLOOD PRESSURE  11     atomoxetine (STRATTERA) 100 MG capsule One cap QAM (with one 25mg cap for total of 125mg QAM). Take after food.       atomoxetine (STRATTERA) 25 MG capsule One cap QAM (with one 100mg cap for total of 125mg QAM). Take after food.       benztropine (COGENTIN) 0.5 MG tablet Take 0.5 mg by mouth       carvedilol (COREG) 12.5 MG tablet TAKE 3 TABLETS (37.5mg) BY MOUTH TWICE DAILY DX HIGH BLOOD PRESSURE  11     cholecalciferol (VITAMIN D-1000 MAX ST) 1000 units TABS Take 1 tablet by mouth every 24 hours       cloZAPine (CLOZARIL) 100 MG tablet 400mg QHS. (4 tabs QHS)       diazepam (VALIUM) 10 MG tablet TAKE 1 TAB BY MOUTH THREE TIMES DAILY.IF TOO SEDATING ,CAN TAKE 1 2 TAB (5MG)  4     diphenhydrAMINE HCl 50 MG TABS One tab QID prn.       " GABAPENTIN PO Take 1,200 mg by mouth 2 times daily Two tablets twice daily       hydrocortisone (CORTAID) 1 % external cream        LANsoprazole (PREVACID) 30 MG DR capsule Take 1 capsule (30 mg) by mouth every 24 hours 90 capsule 3     LEVOTHYROXINE SODIUM PO Take 112 mcg by mouth       lisinopril (PRINIVIL/ZESTRIL) 5 MG tablet TAKE 1 TAB BY MOUTH ONCE DAILY  11     LORazepam (ATIVAN) 2 MG tablet TAKE 1 TAB BY MOUTH THREE TIMES DAILY  4     Lubricants (SURGICAL LUBRICANT) external gel        LUBRICATING PLUS EYE DROPS 0.5 % SOLN ophthalmic solution INSTILL 1 DROP INTO EACH EYE FOUR TIMES DAILY  11     Multiple Vitamins-Minerals (CERTA-KONSTANTIN PO)        olopatadine (PATANOL) 0.1 % ophthalmic solution INSTILL 1 DROP TO BOTH EYES TWICE A DAY  5     oxybutynin (DITROPAN-XL) 10 MG 24 hr tablet TAKE 1 TAB BY MOUTH ONCE DAILY  99     Paliperidone (INVEGA PO) Take 9 mg by mouth       paliperidone (INVEGA SUSTENNA) 234 MG/1.5ML SUSP Inject 234 mg into the muscle       polyethylene glycol (MIRALAX) powder Take 17 g (1 capful) by mouth daily 510 g 11     SM NICOTINE POLACRILEX 2 MG gum CHEW 1 PIECE EVERY HOUR AS NEEDED FOR NICOTINE CRAVINGS  16     topiramate (TOPAMAX) 25 MG tablet 25mg at bedtime for week 1, 50mg at bedtime for 1 week, and 75mg at bedtime thereafter 90 tablet 1     triamcinolone (KENALOG) 0.025 % cream APPLY TWICE DAILY TO RASH ON WRIST AND UNDER ARMPITS  1     VENLAFAXINE HCL PO Take 375 mg by mouth 2 times daily ER       VITAMIN D, CHOLECALCIFEROL, PO Take 2,000 Units by mouth daily          Weight Loss Medication History Reviewed With Patient 8/5/2020   Which weight loss medications are you currently taking on a regular basis?  None       ASSESSMENT/PLAN: MWM follow up doing well on topiramate 75mg daily and wants to increase.  She feels it has started to help and she is tolerating it well.    Increase topiramate to 50mg twice daily      FOLLOW-UP:    1-2 months       Sincerely,    Jennifer Sanchez,  FIDE        Again, thank you for allowing me to participate in the care of your patient.      Sincerely,    Jennifer Sanchez PA-C

## 2020-10-28 ENCOUNTER — VIRTUAL VISIT (OUTPATIENT)
Dept: ENDOCRINOLOGY | Facility: CLINIC | Age: 40
End: 2020-10-28
Payer: MEDICARE

## 2020-10-28 VITALS — BODY MASS INDEX: 37.94 KG/M2 | HEIGHT: 64 IN | WEIGHT: 222.2 LBS

## 2020-10-28 DIAGNOSIS — E66.812 CLASS 2 OBESITY DUE TO EXCESS CALORIES WITH BODY MASS INDEX (BMI) OF 38.0 TO 38.9 IN ADULT, UNSPECIFIED WHETHER SERIOUS COMORBIDITY PRESENT: Primary | ICD-10-CM

## 2020-10-28 DIAGNOSIS — E66.09 CLASS 2 OBESITY DUE TO EXCESS CALORIES WITH BODY MASS INDEX (BMI) OF 38.0 TO 38.9 IN ADULT, UNSPECIFIED WHETHER SERIOUS COMORBIDITY PRESENT: Primary | ICD-10-CM

## 2020-10-28 DIAGNOSIS — E66.01 CLASS 2 SEVERE OBESITY DUE TO EXCESS CALORIES WITH SERIOUS COMORBIDITY AND BODY MASS INDEX (BMI) OF 38.0 TO 38.9 IN ADULT (H): ICD-10-CM

## 2020-10-28 DIAGNOSIS — E66.812 CLASS 2 SEVERE OBESITY DUE TO EXCESS CALORIES WITH SERIOUS COMORBIDITY AND BODY MASS INDEX (BMI) OF 38.0 TO 38.9 IN ADULT (H): ICD-10-CM

## 2020-10-28 PROCEDURE — 99442 PR PHYSICIAN TELEPHONE EVALUATION 11-20 MIN: CPT | Mod: 95 | Performed by: PHYSICIAN ASSISTANT

## 2020-10-28 ASSESSMENT — PAIN SCALES - GENERAL: PAINLEVEL: NO PAIN (0)

## 2020-10-28 ASSESSMENT — MIFFLIN-ST. JEOR: SCORE: 1662.89

## 2020-10-28 NOTE — Clinical Note
Hi scheduling team,  can you please set up appt with Jessica in 1 month to follow up increase of topiramate?     Jessica, She lives at [facility] and the nurse # there is [phone] if you have any ?'s. Her psychiatrist is Senthil Montez and his # is [phone]. I'm going to reach out to him to see if he is ok with me increasing her topiramate to 75mg BID.  She thinks it is helping and reports recent 6 lb wt loss although nursing documentation from beg of Sept to beg of oct actually showed 218 to 228 wt gain so progress is questionnable.  She does feel it is helping her to cut portions and sugar but she still eats too many carbs with rice, pasta, potatoes etc at the apt she lives.

## 2020-10-28 NOTE — LETTER
"10/28/2020       RE: Jonathon Broussard  [address]  [address]  [facility] [address]     Dear Colleague,    Thank you for referring your patient, Jonathon Broussard, to the [facility]NA[facility] [place] at [facility]. Please see a copy of my visit note below.    Jonathon Broussard is a 40 year old female who is being evaluated via a billable telephone visit.      The patient has been notified of following:     \"This telephone visit will be conducted via a call between you and your physician/provider. We have found that certain health care needs can be provided without the need for a physical exam.  This service lets us provide the care you need with a short phone conversation.  If a prescription is necessary we can send it directly to your pharmacy.  If lab work is needed we can place an order for that and you can then stop by our lab to have the test done at a later time.    Telephone visits are billed at different rates depending on your insurance coverage. During this emergency period, for some insurers they may be billed the same as an in-person visit.  Please reach out to your insurance provider with any questions.    If during the course of the call the physician/provider feels a telephone visit is not appropriate, you will not be charged for this service.\"    Patient has given verbal consent for Telephone visit?  Yes    What phone number would you like to be contacted at? [phone]    How would you like to obtain your AVS? Mail a copy    During this virtual visit the patient is located in [place], patient verifies this as the location during the entirety of this visit.   Phone call duration: 15 minutes    Jennifer Sanchez PA-C     Return Medical Weight Management Note     Jonathon Broussard  MRN:  [id number]  :  1980  PO:  10/28/2020    Dear No primary care provider on file.,    I had the pleasure of seeing your patient Jonathon TEMPLETON" "Aarti. She is a 40 year old female who I am continuing to see for treatment of obesity related to:       6/24/2020   I have the following health issues associated with obesity: High Blood Pressure, Sleep Apnea, GERD (Reflux), Asthma   I have the following symptoms associated with obesity: None of the above       INTERVAL HISTORY:  MWM follow up. Last visit we increased to 50 mg twice daily  Last visit 9/23/20 and she has gained from 218 to 228 lbs from beginning of Sept to beginning of Oct   Per nurse manic symptoms intermittently and possibly worse recently.    Labs not done yet, can do at any  labs To find a lab location near you, please call [phone].    Per pt she has cut down on sugar    Nurse # [phone]   Fax labs to nurse at [phone] attn nursing.  [facility]     Psychiatrist: Senthil Montez [phone]    CURRENT WEIGHT:   222 lbs 3.2 oz    Initial Weight (lbs): 228 lbs  Last Visits Weight: 98.9 kg (218 lb)  Cumulative weight loss (lbs): 5.8  Weight Loss Percentage: 2.54%    Changes and Difficulties 10/28/2020   I have made the following changes to my diet since my last visit: snacking less, cut down on sugar intake   With regards to my diet, I am still struggling with: nothing   I have made the following changes to my activity/exercise since my last visit: none   With regards to my activity/exercise, I am still struggling with: nothing       VITALS:  Ht 1.626 m (5' 4\")   Wt 100.8 kg (222 lb 3.2 oz)   BMI 38.14 kg/m      MEDICATIONS:   Current Outpatient Medications   Medication Sig Dispense Refill     amLODIPine (NORVASC) 10 MG tablet TAKE 1 TABLET BY MOUTH EVERY DAY DX HIGH BLOOD PRESSURE  11     atomoxetine (STRATTERA) 100 MG capsule One cap QAM (with one 25mg cap for total of 125mg QAM). Take after food.       atomoxetine (STRATTERA) 25 MG capsule One cap QAM (with one 100mg cap for total of 125mg QAM). Take after food.       benztropine (COGENTIN) 0.5 MG " tablet Take 0.5 mg by mouth       carvedilol (COREG) 12.5 MG tablet TAKE 3 TABLETS (37.5mg) BY MOUTH TWICE DAILY DX HIGH BLOOD PRESSURE  11     cholecalciferol (VITAMIN D-1000 MAX ST) 1000 units TABS Take 1 tablet by mouth every 24 hours       cloZAPine (CLOZARIL) 100 MG tablet 400mg QHS. (4 tabs QHS)       diazepam (VALIUM) 10 MG tablet TAKE 1 TAB BY MOUTH THREE TIMES DAILY.IF TOO SEDATING ,CAN TAKE 1 2 TAB (5MG)  4     diphenhydrAMINE HCl 50 MG TABS One tab QID prn.       GABAPENTIN PO Take 1,200 mg by mouth 2 times daily Two tablets twice daily       hydrocortisone (CORTAID) 1 % external cream        LANsoprazole (PREVACID) 30 MG DR capsule Take 1 capsule (30 mg) by mouth every 24 hours 90 capsule 3     LEVOTHYROXINE SODIUM PO Take 112 mcg by mouth       lisinopril (PRINIVIL/ZESTRIL) 5 MG tablet TAKE 1 TAB BY MOUTH ONCE DAILY  11     Lubricants (SURGICAL LUBRICANT) external gel        LUBRICATING PLUS EYE DROPS 0.5 % SOLN ophthalmic solution INSTILL 1 DROP INTO EACH EYE FOUR TIMES DAILY  11     Multiple Vitamins-Minerals (CERTA-KONSTANTIN PO)        olopatadine (PATANOL) 0.1 % ophthalmic solution INSTILL 1 DROP TO BOTH EYES TWICE A DAY  5     oxybutynin (DITROPAN-XL) 10 MG 24 hr tablet TAKE 1 TAB BY MOUTH ONCE DAILY  99     Paliperidone (INVEGA PO) Take 9 mg by mouth       paliperidone (INVEGA SUSTENNA) 234 MG/1.5ML SUSP Inject 234 mg into the muscle       polyethylene glycol (MIRALAX) powder Take 17 g (1 capful) by mouth daily 510 g 11     SM NICOTINE POLACRILEX 2 MG gum CHEW 1 PIECE EVERY HOUR AS NEEDED FOR NICOTINE CRAVINGS  16     topiramate (TOPAMAX) 25 MG tablet Take 2 tablets (50 mg) by mouth 2 times daily 120 tablet 3     VITAMIN D, CHOLECALCIFEROL, PO Take 2,000 Units by mouth daily        LORazepam (ATIVAN) 2 MG tablet TAKE 1 TAB BY MOUTH THREE TIMES DAILY  4     triamcinolone (KENALOG) 0.025 % cream APPLY TWICE DAILY TO RASH ON WRIST AND UNDER ARMPITS  1     VENLAFAXINE HCL PO Take 375 mg by mouth 2 times  daily ER         Weight Loss Medication History Reviewed With Patient 10/28/2020   Which weight loss medications are you currently taking on a regular basis?  Topamax (topiramate)   Are you having any side effects from the weight loss medication that we have prescribed you? No       ASSESSMENT/PLAN:    MWM follow up. Taking topiramate 50mg twice daily.  She feels it is helping her to reduce sugar.  She is still eating a lot of carbs with her meals.      I will contact her psychiatrist Senthil Montez to see if he is ok with continuing to increase topiramate to 75mg twice daily.  Per nursing at [facility] Jonathon may be displaying more manic symptoms recently.  Next psych appt 11/10 with Dr Montez.    Jennifer Sanchez PA-C     FOLLOW-UP:    4 weeks Jennifer Sanchez PA-C

## 2020-10-28 NOTE — PROGRESS NOTES
"Jonathon Broussard is a 40 year old female who is being evaluated via a billable telephone visit.      The patient has been notified of following:     \"This telephone visit will be conducted via a call between you and your physician/provider. We have found that certain health care needs can be provided without the need for a physical exam.  This service lets us provide the care you need with a short phone conversation.  If a prescription is necessary we can send it directly to your pharmacy.  If lab work is needed we can place an order for that and you can then stop by our lab to have the test done at a later time.    Telephone visits are billed at different rates depending on your insurance coverage. During this emergency period, for some insurers they may be billed the same as an in-person visit.  Please reach out to your insurance provider with any questions.    If during the course of the call the physician/provider feels a telephone visit is not appropriate, you will not be charged for this service.\"    Patient has given verbal consent for Telephone visit?  Yes    What phone number would you like to be contacted at? [phone]    How would you like to obtain your AVS? Mail a copy    During this virtual visit the patient is located in [place], patient verifies this as the location during the entirety of this visit.   Phone call duration: 15 minutes    Jennifer Sanchez PA-C         Return Medical Weight Management Note     Jonathon Broussard  MRN:  [id number]  :  1980  PO:  10/28/2020    Dear No primary care provider on file.,    I had the pleasure of seeing your patient Jonathon Broussard. She is a 40 year old female who I am continuing to see for treatment of obesity related to:       2020   I have the following health issues associated with obesity: High Blood Pressure, Sleep Apnea, GERD (Reflux), Asthma   I have the following symptoms associated with obesity: None of the above       INTERVAL " "HISTORY:  [facility] follow up. Last visit we increased to 50 mg twice daily  Last visit 9/23/20 and she has gained from 218 to 228 lbs from beginning of Sept to beginning of Oct   Per nurse manic symptoms intermittently and possibly worse recently.    Labs not done yet, can do at any  labs To find a lab location near you, please call [phone].    Per pt she has cut down on sugar    Nurse # [phone]   Fax labs to nurse at [phone] attn nursing.  [facility]     Psychiatrist: Senthil Montez [phone]    CURRENT WEIGHT:   222 lbs 3.2 oz    Initial Weight (lbs): 228 lbs  Last Visits Weight: 98.9 kg (218 lb)  Cumulative weight loss (lbs): 5.8  Weight Loss Percentage: 2.54%    Changes and Difficulties 10/28/2020   I have made the following changes to my diet since my last visit: snacking less, cut down on sugar intake   With regards to my diet, I am still struggling with: nothing   I have made the following changes to my activity/exercise since my last visit: none   With regards to my activity/exercise, I am still struggling with: nothing       VITALS:  Ht 1.626 m (5' 4\")   Wt 100.8 kg (222 lb 3.2 oz)   BMI 38.14 kg/m      MEDICATIONS:   Current Outpatient Medications   Medication Sig Dispense Refill     amLODIPine (NORVASC) 10 MG tablet TAKE 1 TABLET BY MOUTH EVERY DAY DX HIGH BLOOD PRESSURE  11     atomoxetine (STRATTERA) 100 MG capsule One cap QAM (with one 25mg cap for total of 125mg QAM). Take after food.       atomoxetine (STRATTERA) 25 MG capsule One cap QAM (with one 100mg cap for total of 125mg QAM). Take after food.       benztropine (COGENTIN) 0.5 MG tablet Take 0.5 mg by mouth       carvedilol (COREG) 12.5 MG tablet TAKE 3 TABLETS (37.5mg) BY MOUTH TWICE DAILY DX HIGH BLOOD PRESSURE  11     cholecalciferol (VITAMIN D-1000 MAX ST) 1000 units TABS Take 1 tablet by mouth every 24 hours       cloZAPine (CLOZARIL) 100 MG tablet 400mg QHS. (4 tabs QHS)       diazepam (VALIUM) " 10 MG tablet TAKE 1 TAB BY MOUTH THREE TIMES DAILY.IF TOO SEDATING ,CAN TAKE 1 2 TAB (5MG)  4     diphenhydrAMINE HCl 50 MG TABS One tab QID prn.       GABAPENTIN PO Take 1,200 mg by mouth 2 times daily Two tablets twice daily       hydrocortisone (CORTAID) 1 % external cream        LANsoprazole (PREVACID) 30 MG DR capsule Take 1 capsule (30 mg) by mouth every 24 hours 90 capsule 3     LEVOTHYROXINE SODIUM PO Take 112 mcg by mouth       lisinopril (PRINIVIL/ZESTRIL) 5 MG tablet TAKE 1 TAB BY MOUTH ONCE DAILY  11     Lubricants (SURGICAL LUBRICANT) external gel        LUBRICATING PLUS EYE DROPS 0.5 % SOLN ophthalmic solution INSTILL 1 DROP INTO EACH EYE FOUR TIMES DAILY  11     Multiple Vitamins-Minerals (CERTA-KONSTANTIN PO)        olopatadine (PATANOL) 0.1 % ophthalmic solution INSTILL 1 DROP TO BOTH EYES TWICE A DAY  5     oxybutynin (DITROPAN-XL) 10 MG 24 hr tablet TAKE 1 TAB BY MOUTH ONCE DAILY  99     Paliperidone (INVEGA PO) Take 9 mg by mouth       paliperidone (INVEGA SUSTENNA) 234 MG/1.5ML SUSP Inject 234 mg into the muscle       polyethylene glycol (MIRALAX) powder Take 17 g (1 capful) by mouth daily 510 g 11     SM NICOTINE POLACRILEX 2 MG gum CHEW 1 PIECE EVERY HOUR AS NEEDED FOR NICOTINE CRAVINGS  16     topiramate (TOPAMAX) 25 MG tablet Take 2 tablets (50 mg) by mouth 2 times daily 120 tablet 3     VITAMIN D, CHOLECALCIFEROL, PO Take 2,000 Units by mouth daily        LORazepam (ATIVAN) 2 MG tablet TAKE 1 TAB BY MOUTH THREE TIMES DAILY  4     triamcinolone (KENALOG) 0.025 % cream APPLY TWICE DAILY TO RASH ON WRIST AND UNDER ARMPITS  1     VENLAFAXINE HCL PO Take 375 mg by mouth 2 times daily ER         Weight Loss Medication History Reviewed With Patient 10/28/2020   Which weight loss medications are you currently taking on a regular basis?  Topamax (topiramate)   Are you having any side effects from the weight loss medication that we have prescribed you? No       ASSESSMENT/PLAN:    MWM follow up. Taking  topiramate 50mg twice daily.  She feels it is helping her to reduce sugar.  She is still eating a lot of carbs with her meals.      I will contact her psychiatrist Senthil Montez to see if he is ok with continuing to increase topiramate to 75mg twice daily.  Per nursing at [facility] Jonathon may be displaying more manic symptoms recently.  Next psych appt 11/10 with Dr Montez.    Jennifer Sanchez PA-C     FOLLOW-UP:    4 weeks Jennifer Sanchez PA-C        Sincerely,    Jennifer Sanchez PA-C

## 2020-10-28 NOTE — Clinical Note
Lupe and Maegan, can one of you print labs ordered on this pt 6/24/20 by me and fax to [phone] attn nursing?  This is to [facility].  Thanks! Jennifer

## 2020-10-28 NOTE — NURSING NOTE
"Chief Complaint   Patient presents with     Follow Up     [facility] follow up       Vitals:    10/28/20 1210   Weight: 100.8 kg (222 lb 3.2 oz)   Height: 1.626 m (5' 4\")       Body mass index is 38.14 kg/m .                            "

## 2020-10-29 ENCOUNTER — TELEPHONE (OUTPATIENT)
Dept: ENDOCRINOLOGY | Facility: CLINIC | Age: 40
End: 2020-10-29

## 2020-10-29 ENCOUNTER — CARE COORDINATION (OUTPATIENT)
Dept: ENDOCRINOLOGY | Facility: CLINIC | Age: 40
End: 2020-10-29

## 2020-10-29 RX ORDER — TOPIRAMATE 25 MG/1
75 TABLET, FILM COATED ORAL 2 TIMES DAILY
Qty: 180 TABLET | Refills: 3 | Status: SHIPPED | OUTPATIENT
Start: 2020-10-29 | End: 2020-11-19

## 2020-10-29 NOTE — TELEPHONE ENCOUNTER
Patient to increase Topirmate to 75 mg twice a day.  Left message for patient to call the office regarding dosage change.

## 2020-10-30 ENCOUNTER — TELEPHONE (OUTPATIENT)
Dept: ENDOCRINOLOGY | Facility: CLINIC | Age: 40
End: 2020-10-30

## 2020-11-19 ENCOUNTER — VIRTUAL VISIT (OUTPATIENT)
Dept: ENDOCRINOLOGY | Facility: CLINIC | Age: 40
End: 2020-11-19
Payer: MEDICARE

## 2020-11-19 VITALS — WEIGHT: 222.3 LBS | BODY MASS INDEX: 37.04 KG/M2 | HEIGHT: 65 IN

## 2020-11-19 DIAGNOSIS — E66.812 CLASS 2 SEVERE OBESITY DUE TO EXCESS CALORIES WITH SERIOUS COMORBIDITY AND BODY MASS INDEX (BMI) OF 38.0 TO 38.9 IN ADULT (H): ICD-10-CM

## 2020-11-19 DIAGNOSIS — E66.01 CLASS 2 SEVERE OBESITY DUE TO EXCESS CALORIES WITH SERIOUS COMORBIDITY AND BODY MASS INDEX (BMI) OF 38.0 TO 38.9 IN ADULT (H): ICD-10-CM

## 2020-11-19 DIAGNOSIS — E66.01 MORBID OBESITY (H): ICD-10-CM

## 2020-11-19 PROCEDURE — 99441 PR PHYSICIAN TELEPHONE EVALUATION 5-10 MIN: CPT | Mod: 95 | Performed by: PHYSICIAN ASSISTANT

## 2020-11-19 RX ORDER — TOPIRAMATE 25 MG/1
100 TABLET, FILM COATED ORAL 2 TIMES DAILY
Qty: 240 TABLET | Refills: 3 | Status: SHIPPED | OUTPATIENT
Start: 2020-11-19 | End: 2021-02-02

## 2020-11-19 ASSESSMENT — PAIN SCALES - GENERAL: PAINLEVEL: NO PAIN (0)

## 2020-11-19 ASSESSMENT — MIFFLIN-ST. JEOR: SCORE: 1672.4

## 2020-11-19 NOTE — LETTER
"2020       RE: Jonathon Broussard  [address]  [address]  [facility] [address]     Dear Colleague,    Thank you for referring your patient, Jonathon Broussard, to the [facility]NAG[facility] [place] at [facility]. Please see a copy of my visit note below.    Jonathon Broussard is a 40 year old female who is being evaluated via a billable telephone visit.      The patient has been notified of following:     \"This telephone visit will be conducted via a call between you and your physician/provider. We have found that certain health care needs can be provided without the need for a physical exam.  This service lets us provide the care you need with a short phone conversation.  If a prescription is necessary we can send it directly to your pharmacy.  If lab work is needed we can place an order for that and you can then stop by our lab to have the test done at a later time.    Telephone visits are billed at different rates depending on your insurance coverage. During this emergency period, for some insurers they may be billed the same as an in-person visit.  Please reach out to your insurance provider with any questions.    If during the course of the call the physician/provider feels a telephone visit is not appropriate, you will not be charged for this service.\"    Patient has given verbal consent for Telephone visit?  Yes    What phone number would you like to be contacted at? [phone]    How would you like to obtain your AVS? Remotium    Phone call duration: 10 minutes    Jennifer Sanchez PA-C         Return Medical Weight Management Note     Jonathon Broussard  MRN:  [id number]  :  1980  PO:  2020    Dear No primary care provider on file.,    I had the pleasure of seeing your patient Jonathon Broussard. She is a 40 year old female who I am continuing to see for treatment of obesity related to:       2020   I have the " "following health issues associated with obesity: High Blood Pressure, Sleep Apnea, GERD (Reflux), Asthma   I have the following symptoms associated with obesity: None of the above       INTERVAL HISTORY:  Last visit we increased topiramate to 75mg twice daily and she is tolerating it.  Weight stable since last visit.    CURRENT WEIGHT:   222 lbs 4.8 oz    Initial Weight (lbs): 228 lbs  Last Visits Weight: 100.8 kg (222 lb 3.2 oz)  Cumulative weight loss (lbs): 5.7  Weight Loss Percentage: 2.5%    Changes and Difficulties 11/19/2020   I have made the following changes to my diet since my last visit: no changes   With regards to my diet, I am still struggling with: a bit of a sweet tooth   I have made the following changes to my activity/exercise since my last visit: none   With regards to my activity/exercise, I am still struggling with: getting exercise, haven't been getting much       VITALS:  Ht 1.64 m (5' 4.57\")   Wt 100.8 kg (222 lb 4.8 oz)   BMI 37.49 kg/m      MEDICATIONS:   Current Outpatient Medications   Medication Sig Dispense Refill     amLODIPine (NORVASC) 10 MG tablet TAKE 1 TABLET BY MOUTH EVERY DAY DX HIGH BLOOD PRESSURE  11     atomoxetine (STRATTERA) 100 MG capsule One cap QAM (with one 25mg cap for total of 125mg QAM). Take after food.       atomoxetine (STRATTERA) 25 MG capsule One cap QAM (with one 100mg cap for total of 125mg QAM). Take after food.       benztropine (COGENTIN) 0.5 MG tablet Take 0.5 mg by mouth       carvedilol (COREG) 12.5 MG tablet TAKE 3 TABLETS (37.5mg) BY MOUTH TWICE DAILY DX HIGH BLOOD PRESSURE  11     cholecalciferol (VITAMIN D-1000 MAX ST) 1000 units TABS Take 1 tablet by mouth every 24 hours       cloZAPine (CLOZARIL) 100 MG tablet 400mg QHS. (4 tabs QHS)       diazepam (VALIUM) 10 MG tablet TAKE 1 TAB BY MOUTH THREE TIMES DAILY.IF TOO SEDATING ,CAN TAKE 1 2 TAB (5MG)  4     diphenhydrAMINE HCl 50 MG TABS One tab QID prn.       GABAPENTIN PO Take 1,200 mg by mouth 2 " times daily Two tablets twice daily       hydrocortisone (CORTAID) 1 % external cream        LANsoprazole (PREVACID) 30 MG DR capsule Take 1 capsule (30 mg) by mouth every 24 hours 90 capsule 3     LEVOTHYROXINE SODIUM PO Take 112 mcg by mouth       lisinopril (PRINIVIL/ZESTRIL) 5 MG tablet TAKE 1 TAB BY MOUTH ONCE DAILY  11     LORazepam (ATIVAN) 2 MG tablet TAKE 1 TAB BY MOUTH THREE TIMES DAILY  4     Lubricants (SURGICAL LUBRICANT) external gel        LUBRICATING PLUS EYE DROPS 0.5 % SOLN ophthalmic solution INSTILL 1 DROP INTO EACH EYE FOUR TIMES DAILY  11     Multiple Vitamins-Minerals (CERTA-KONSTANTIN PO)        olopatadine (PATANOL) 0.1 % ophthalmic solution INSTILL 1 DROP TO BOTH EYES TWICE A DAY  5     oxybutynin (DITROPAN-XL) 10 MG 24 hr tablet TAKE 1 TAB BY MOUTH ONCE DAILY  99     Paliperidone (INVEGA PO) Take 9 mg by mouth       paliperidone (INVEGA SUSTENNA) 234 MG/1.5ML SUSP Inject 234 mg into the muscle       polyethylene glycol (MIRALAX) powder Take 17 g (1 capful) by mouth daily 510 g 11     SM NICOTINE POLACRILEX 2 MG gum CHEW 1 PIECE EVERY HOUR AS NEEDED FOR NICOTINE CRAVINGS  16     topiramate (TOPAMAX) 25 MG tablet Take 3 tablets (75 mg) by mouth 2 times daily 180 tablet 3     triamcinolone (KENALOG) 0.025 % cream APPLY TWICE DAILY TO RASH ON WRIST AND UNDER ARMPITS  1     VENLAFAXINE HCL PO Take 375 mg by mouth 2 times daily ER       VITAMIN D, CHOLECALCIFEROL, PO Take 2,000 Units by mouth daily          Weight Loss Medication History Reviewed With Patient 11/19/2020   Which weight loss medications are you currently taking on a regular basis?  Topamax (topiramate)   Are you having any side effects from the weight loss medication that we have prescribed you? No       ASSESSMENT/PLAN:    MWM follow up.  Tolerating topiramate 75mg twice daily. Wt stable since last visit.  She feels she is making good food changes and hunger is more controlled.    Increase topiramate to 100mg twice  daily        FOLLOW-UP:    12 weeks or earlier if any issues with higher dose.      Sincerely,    Jennifer Sanchez PA-C

## 2020-11-19 NOTE — NURSING NOTE
"Chief Complaint   Patient presents with     RECHECK     Follow up mw appt.       Vitals:    11/19/20 1133   Height: 1.64 m (5' 4.57\")       Body mass index is 37.47 kg/m .                            CHRISTINA MAURER, EMT    "

## 2020-11-19 NOTE — PROGRESS NOTES
"Jonathon Broussard is a 40 year old female who is being evaluated via a billable telephone visit.      The patient has been notified of following:     \"This telephone visit will be conducted via a call between you and your physician/provider. We have found that certain health care needs can be provided without the need for a physical exam.  This service lets us provide the care you need with a short phone conversation.  If a prescription is necessary we can send it directly to your pharmacy.  If lab work is needed we can place an order for that and you can then stop by our lab to have the test done at a later time.    Telephone visits are billed at different rates depending on your insurance coverage. During this emergency period, for some insurers they may be billed the same as an in-person visit.  Please reach out to your insurance provider with any questions.    If during the course of the call the physician/provider feels a telephone visit is not appropriate, you will not be charged for this service.\"    Patient has given verbal consent for Telephone visit?  Yes    What phone number would you like to be contacted at? [phone]    How would you like to [facility] your [facility]? [facility]    Phone call duration: 10 minutes    Jennifer Sanchez PA-C         Return Medical Weight Management Note     Jonathon Broussard  MRN:  [id number]  :  1980  PO:  2020    Dear No primary care provider on file.,    I had the pleasure of seeing your patient Jonathon Broussard. She is a 40 year old female who I am continuing to see for treatment of obesity related to:       2020   I have the following health issues associated with obesity: High Blood Pressure, Sleep Apnea, GERD (Reflux), Asthma   I have the following symptoms associated with obesity: None of the above       INTERVAL HISTORY:  Last visit we increased topiramate to 75mg twice daily and she is tolerating it.  Weight stable since last visit.    CURRENT " "WEIGHT:   222 lbs 4.8 oz    Initial Weight (lbs): 228 lbs  Last Visits Weight: 100.8 kg (222 lb 3.2 oz)  Cumulative weight loss (lbs): 5.7  Weight Loss Percentage: 2.5%    Changes and Difficulties 11/19/2020   I have made the following changes to my diet since my last visit: no changes   With regards to my diet, I am still struggling with: a bit of a sweet tooth   I have made the following changes to my activity/exercise since my last visit: none   With regards to my activity/exercise, I am still struggling with: getting exercise, haven't been getting much       VITALS:  Ht 1.64 m (5' 4.57\")   Wt 100.8 kg (222 lb 4.8 oz)   BMI 37.49 kg/m      MEDICATIONS:   Current Outpatient Medications   Medication Sig Dispense Refill     amLODIPine (NORVASC) 10 MG tablet TAKE 1 TABLET BY MOUTH EVERY DAY DX HIGH BLOOD PRESSURE  11     atomoxetine (STRATTERA) 100 MG capsule One cap QAM (with one 25mg cap for total of 125mg QAM). Take after food.       atomoxetine (STRATTERA) 25 MG capsule One cap QAM (with one 100mg cap for total of 125mg QAM). Take after food.       benztropine (COGENTIN) 0.5 MG tablet Take 0.5 mg by mouth       carvedilol (COREG) 12.5 MG tablet TAKE 3 TABLETS (37.5mg) BY MOUTH TWICE DAILY DX HIGH BLOOD PRESSURE  11     cholecalciferol (VITAMIN D-1000 MAX ST) 1000 units TABS Take 1 tablet by mouth every 24 hours       cloZAPine (CLOZARIL) 100 MG tablet 400mg QHS. (4 tabs QHS)       diazepam (VALIUM) 10 MG tablet TAKE 1 TAB BY MOUTH THREE TIMES DAILY.IF TOO SEDATING ,CAN TAKE 1 2 TAB (5MG)  4     diphenhydrAMINE HCl 50 MG TABS One tab QID prn.       GABAPENTIN PO Take 1,200 mg by mouth 2 times daily Two tablets twice daily       hydrocortisone (CORTAID) 1 % external cream        LANsoprazole (PREVACID) 30 MG DR capsule Take 1 capsule (30 mg) by mouth every 24 hours 90 capsule 3     LEVOTHYROXINE SODIUM PO Take 112 mcg by mouth       lisinopril (PRINIVIL/ZESTRIL) 5 MG tablet TAKE 1 TAB BY MOUTH ONCE DAILY  11     " LORazepam (ATIVAN) 2 MG tablet TAKE 1 TAB BY MOUTH THREE TIMES DAILY  4     Lubricants (SURGICAL LUBRICANT) external gel        LUBRICATING PLUS EYE DROPS 0.5 % SOLN ophthalmic solution INSTILL 1 DROP INTO EACH EYE FOUR TIMES DAILY  11     Multiple Vitamins-Minerals (CERTA-KONSTANTIN PO)        olopatadine (PATANOL) 0.1 % ophthalmic solution INSTILL 1 DROP TO BOTH EYES TWICE A DAY  5     oxybutynin (DITROPAN-XL) 10 MG 24 hr tablet TAKE 1 TAB BY MOUTH ONCE DAILY  99     Paliperidone (INVEGA PO) Take 9 mg by mouth       paliperidone (INVEGA SUSTENNA) 234 MG/1.5ML SUSP Inject 234 mg into the muscle       polyethylene glycol (MIRALAX) powder Take 17 g (1 capful) by mouth daily 510 g 11     SM NICOTINE POLACRILEX 2 MG gum CHEW 1 PIECE EVERY HOUR AS NEEDED FOR NICOTINE CRAVINGS  16     topiramate (TOPAMAX) 25 MG tablet Take 3 tablets (75 mg) by mouth 2 times daily 180 tablet 3     triamcinolone (KENALOG) 0.025 % cream APPLY TWICE DAILY TO RASH ON WRIST AND UNDER ARMPITS  1     VENLAFAXINE HCL PO Take 375 mg by mouth 2 times daily ER       VITAMIN D, CHOLECALCIFEROL, PO Take 2,000 Units by mouth daily          Weight Loss Medication History Reviewed With Patient 11/19/2020   Which weight loss medications are you currently taking on a regular basis?  Topamax (topiramate)   Are you having any side effects from the weight loss medication that we have prescribed you? No       ASSESSMENT/PLAN:    MWM follow up.  Tolerating topiramate 75mg twice daily. Wt stable since last visit.  She feels she is making good food changes and hunger is more controlled.    Increase topiramate to 100mg twice daily        FOLLOW-UP:    12 weeks or earlier if any issues with higher dose.      Sincerely,    Jennifer Sanchze PA-C

## 2020-12-04 ENCOUNTER — TELEPHONE (OUTPATIENT)
Dept: SURGERY | Facility: CLINIC | Age: 40
End: 2020-12-04

## 2020-12-04 NOTE — TELEPHONE ENCOUNTER
Pt has questions about her prescription Topirmate    [phone]  **ok to leave a detailed message

## 2020-12-07 NOTE — TELEPHONE ENCOUNTER
Called and spoke with patient in regards to Topiramate questions. Patient states since increasing Topiramate dose from 75mg BID to 100mg BID, she has been feeling more depressed and having suicidal thoughts. Patient states she did not have these side effects at previous dose. Advised patient to decrease back down to 75mg BID and call back if side effects do not improve in 1-2 days. Patient understands and agrees with plan. Discussed with patient to possibility of weaning further down to 50mg BID if necessary. Patient had no further questions at this time. Routing to Jennifer Sanchez PA-C as GENNY.

## 2020-12-08 ENCOUNTER — PATIENT OUTREACH (OUTPATIENT)
Dept: ENDOCRINOLOGY | Facility: CLINIC | Age: 40
End: 2020-12-08

## 2020-12-08 ENCOUNTER — TELEPHONE (OUTPATIENT)
Dept: ENDOCRINOLOGY | Facility: CLINIC | Age: 40
End: 2020-12-08

## 2020-12-08 DIAGNOSIS — E66.01 MORBID OBESITY (H): Primary | ICD-10-CM

## 2020-12-08 RX ORDER — TOPIRAMATE 25 MG/1
75 TABLET, FILM COATED ORAL 2 TIMES DAILY
Qty: 120 TABLET | Refills: 3 | Status: SHIPPED | OUTPATIENT
Start: 2020-12-08 | End: 2021-02-02

## 2020-12-08 NOTE — TELEPHONE ENCOUNTER
Discussed with Jennifer Sanchez PA-C over the phone about plan from previous phone call (patient's Topiramate dose). Per Jennifer Sanchez PA-C, ok to decrease Topiramate back down to 75mg BID for now. Message was also sent to QUENTIN Issa to follow up with nursing staff and patient's psychiatrist. Will write order for new dose of Topiramate to decrease back to 75mg BID and have clinic staff fax to AUDI Carvalho at assisted living facility.

## 2020-12-08 NOTE — LETTER
December 8, 2020      TO: Jonathon Broussard  [address]  [address]  [facility] [address]         To whom it may concern Re: Ms. Jonathon Bruossard,    Patient was seen virtually for medical weight management follow up on 11/19/2020. At the time of visit, Topiramate dose was increased from 75mg twice daily to 100mg twice daily. Since then, patient reports increased depression and suicidal thoughts. Per Jennifer Sanchez PA-C, patient is to decrease Topiramate dose back down to 75mg twice daily. May consider further decrease if side effects do not resolve. Please contact my office with any further questions.       Sincerely,      Jennifer Sanchez PA-C

## 2020-12-08 NOTE — TELEPHONE ENCOUNTER
Pt's assisted living home (RN Maia) asks that the recent note from [facility] regarding lowering of topiramate discussion with pt (pt did not convey completely to her team) be faxed over to them. They need an official 'order'.    Fax to: Attn Nursing Staff, [phone]

## 2020-12-08 NOTE — TELEPHONE ENCOUNTER
Spoke with Maddie at [facility].  She states patient also had a decrease in Quasryl that occurred before patient's symptoms started.  States patient is very fixated on Topiramate decrease and told staff she was supposed to taper off.  New order for 75 mg BID was faxed to [phone] per request.

## 2020-12-08 NOTE — PROGRESS NOTES
Phone call to patient: Home number rings 10 times and then hangs up.  Left message on cell phone to call the office with a status update.    Phone call to [facility] regarding patient status.  Left message for Mdadie to call the office with a status update on patient.    Spoke with RN at Senthil Montez's office.  Notified of patient side effects and dosage decrease to 75 mg BID.

## 2021-02-02 ENCOUNTER — VIRTUAL VISIT (OUTPATIENT)
Dept: ENDOCRINOLOGY | Facility: CLINIC | Age: 41
End: 2021-02-02
Payer: COMMERCIAL

## 2021-02-02 VITALS — WEIGHT: 226.8 LBS | HEIGHT: 64 IN | BODY MASS INDEX: 38.72 KG/M2

## 2021-02-02 DIAGNOSIS — K21.9 GASTROESOPHAGEAL REFLUX DISEASE: ICD-10-CM

## 2021-02-02 DIAGNOSIS — T50.905A WEIGHT GAIN DUE TO MEDICATION: ICD-10-CM

## 2021-02-02 DIAGNOSIS — E66.01 MORBID OBESITY (H): Primary | ICD-10-CM

## 2021-02-02 DIAGNOSIS — R63.5 WEIGHT GAIN DUE TO MEDICATION: ICD-10-CM

## 2021-02-02 PROCEDURE — 99214 OFFICE O/P EST MOD 30 MIN: CPT | Mod: 95 | Performed by: NURSE PRACTITIONER

## 2021-02-02 RX ORDER — LITHIUM CARBONATE 600 MG/1
1200 CAPSULE ORAL DAILY
COMMUNITY

## 2021-02-02 RX ORDER — LURASIDONE HYDROCHLORIDE 40 MG/1
120 TABLET, FILM COATED ORAL
COMMUNITY

## 2021-02-02 RX ORDER — METFORMIN HCL 500 MG
TABLET, EXTENDED RELEASE 24 HR ORAL
Qty: 60 TABLET | Refills: 3 | Status: SHIPPED | OUTPATIENT
Start: 2021-02-02 | End: 2021-05-03

## 2021-02-02 RX ORDER — TOPIRAMATE 25 MG/1
75 TABLET, FILM COATED ORAL 2 TIMES DAILY
Qty: 120 TABLET | Refills: 3 | Status: SHIPPED | OUTPATIENT
Start: 2021-02-02 | End: 2021-05-11

## 2021-02-02 ASSESSMENT — MIFFLIN-ST. JEOR: SCORE: 1683.76

## 2021-02-02 ASSESSMENT — PAIN SCALES - GENERAL: PAINLEVEL: NO PAIN (0)

## 2021-02-02 NOTE — PATIENT INSTRUCTIONS
"  It was a pleasure meeting with you today.    Thank you for allowing us the privilege of caring for you. We hope we provided you with the excellent service you deserve.   Please let us know if there is anything else we can do for you so that we can be sure you are leaving completely satisfied with your care experience.    To ensure the quality of our services you may be receiving a patient satisfaction survey from an independent patient satisfaction monitoring company.    The greatest compliment you can give is a \"Likely to Recommend\"        Your visit was with Jessica Contreras NP today.     Instructions per today's visit:   Continue topiramate 75mg twice daily   Start metforming- go up to 1000mg once daily with a meal (2 tab)   Work on eating more vegetables and protein to help with hunger - see handouts in packet   Follow up 3 months with Jennifer Sanchez or myself       To schedule appointments with our team, please call [phone] option #1    Please call during clinic hours Monday through Friday 8:00a - 4:00p if you have questions or you can contact us via [employer] at anytime.      Nurses: [phone]  Fax: [phone]   _____________________________________________________________________________________________________________________________    Meal Replacement Products:    Here is the link to our new e-store where you can purchase our meal replacement products     [employer] E-Store  [employer] Inspire/store    The one week starter kit is a great way to sample a variety of products and see what works for you.    If you want more information about the product go to: Clinc!    Free Shipping for orders over $75     Benefits of meal replacements products:    Portion and calorie control  Improved nutrition  Structured eating  Simplified food choices  Avoid contact with trigger " foods  ______________________________________________________________________________________________________________________________    Healthy Lifestyle Support Group   [facility] Weight [facility]  Virtual Support Group Now Available    60 minutes of small group guided discussion, support and resources    Led by Health , Ester Byrd    For questions, and to receive the invite information, contact Ester at [email or web address]    All our welcome!    One Friday per month, 12:30pm to 1:30pm  SELF COMPASSION: July 31st  CREATING MY WELLNESS VISION: August 28th  MINDFULNESS PRACTICES FOR A CALM BODY & MIND: September 25th  MINDFUL EATING TOOLS: October 30th  HEALTHY& HAPPY HOLIDAYS: November 20th  OPEN FORUM: December 18th  _______________________________________________________________________________________________________________________________    Connections: Bariatric Care support group  Due to the Covid-19 restrictions, we will be doing our support group virtually using Microsoft Teams. You will need to get an invitation to the group from Jeremy Muñoz, Ph.D., LP at [email or web address] and to learn about using Microsoft Teams. The next meeting is on Tuesday, July 14 between 6:30 and 8 PM and there will be no formal speaker.    This group meets the second Tuesday of each month from 6:30 to 8 PM and will be held again at [facility] in the  [facility] [place] (A-B room) when the Covid-19 restrictions are lifted. The group is led by Jeremy Muñoz, Ph.D., Licensed Psychologist, [facility] Weight Management [facility]. There is no cost for group participation and is open to all [facility] (and those external to this program) pre- and post-operative bariatric surgery patients as well as their support system.    _________________________________________________________________________________________________________________________________      Interested in working with a health ?  Health coaches work with you to improve your overall health and wellbeing.  They look at the whole person, and may involve discussion of different areas of life, including, but not limited to the four pillars of health (sleep, exercise, nutrition, and stress management). Discuss with your care team if you would like to start working a health .     Health Coaching-3 Pack:      $99 for three health coaching visits    Visits may be done in person or via phone    Coaching is a partnership between the  and the client; Coaches do not prescribe or diagnose    Coaching helps inspire the client to reach his/her personal goals   __________________________________________________________________________________________________________________________________    [place] [facility] Get Moving Program    Our team of physical therapists is trained to help you understand and take control of your condition. They will perform a thorough evaluation to determine your ability for activity and develop a customized plan to fit your goals and physical ability.  Scheduling: Unsure if the Get Moving program is right for you? Discuss the program with your medical provider or diabetes educator. You can also call us at [phone] to ask questions or schedule an appointment.   [facility] Get Moving Program  ______________________________________________________________________________________________________________________  Bluetooth Scale:    We hope to provide you with high quality telephone and virtual healthcare visits while social distancing for COVID-19 is necessary, as well as in the future when virtual visits may be more convenient for you.     Our technology team made it possible for [employer] scales to send weight measurements to our  electronic medical record. This allows weights from you weighing at home to securely flow into the medical record, which will improve telephone and virtual visits.   Additionally, studies have shown that adults actually lose more weight when their weights are automatically sent to someone else, and also that this process is not stressful for those adults.    Below is a link for purchasing the scale, with a discount code for our patients. You may call your insurance company to see if they will reimburse you for the cost of the scale, as a piece of durable medical equipment. The scales only go up to a weight of 400 pounds. This is an issue and we are working with the developer on increasing this. We found no scales that go over 400lb that have blue-tooth for connecting to iovox.    Scale to purchase: the DailyDeal  Body  Scale: [email or web address].ds    Discount Code: We have a discount code for our patients to bring the cost down to $50, the code is: [place]_Scale_20%off    Steps to link the scale to iovox via an Android Phone (you can always disconnect at any point in the future):  1. The order must be placed first before the patient can access Track My Health within iovox.  2. Download Google Fit lamin from the Google Play Store   a. Log in or register using your Google account   3. Download the iovox lamin from Google Play Store  a. Select add organization   b. Search for [employer] and select it   c. Log into iovox  d. Select Track My Health   e. Select the green connect my account button   f. When prompted log into your Google account   g. Select okay to confirm the account   4. Download the [employer] Health Mate lamin from Google Play Store  a. [place] for DailyDeal   b. Go to profile   c. Tap google fit under the Apps section  d. Select the option to activate Google Fit integration   e. Select the same Google  account   f. Select okay to confirm the account  g.   Steps to link the scale to CapLinked via an iPhone (you can always disconnect at any point in the future):  **Note Job2Day is not available for download on an iPad**  1. The order must be placed first before the patient can access Track My Health within CapLinked.  2. Locate the Health shabnam on your iPhone.  a. Set up your Apple Health account as prompted  b. The Sources page will show Apps that communicate with your Health shabnam. Once all steps are completed, you should see [employer] and [employer] listed under the Apps section and your iPhone under the devices section.  i. Select Health Mate  1. Under 'ALLOW  HEALTH MATE  TO WRITE DATA ensure the toggle is on for Weight.  2. This will allow the scale to add your weight to the Apple Health  ii. Select MyChart  1. Under 'ALLOW  [employer]  TO READ DATA ensure the toggle is on for Weight.  2. This allows CapLinked to grab the weight from Job2Day so your provider can see your weights.  3. Download the CapLinked shabnam from the Shabnam Store   a. Select add organization                                                  b. Search for [employer] and select it  c. Log into CapLinked  d. Select Track My Health   e. Select the green connect my account button   f. Follow prompts to link your device to CapLinked.  4. Download the [facility] health mate shabnam in the Shabnam Store   a. [place] for [place]   b. Go to profile   c. [employer] under the Apps section  d. If prompted to allow access with the Health Shabnam, toggle weight on for read and write access.         MEDICATION STARTED AT THIS APPOINTMENT    We are starting metformin.  Starting instructions:      Immediate release tablet: Take 1 tablet by mouth daily with a meal for 1 week, then increase to 1 tablet twice daily with meals.     Extended release tablet: Take 1 tablet by mouth daily with a meal for 1 week, then increase to 2 tablets daily with a meal or 1 tablet twice daily with meals.       Contact the nurse via [facility] or call [phone] if you have any questions or concerns    Metformin is a medication that is used to assist with lowering blood sugars in patients that have Pre-Diabetes or Diabetes. It has also been found to help with weight loss.    Metformin helps to lower blood sugars by lowering the amount of sugar (glucose) your liver produces that would otherwise cause your blood sugar to increase. It also makes your body respond better to insulin (the product that lowers your blood sugar). It is unclear how metformin assists with weight loss.     Side-effects. Metformin is generally well tolerated. The main side-effects we see are diarrhea, nausea and stomach upset - this tends to subside over time as your body gets used to the medication. Taking this medications with food will lower these side effects.    Low blood sugar (hypoglycemia) is rare to occur with metformin, but can occur if you do not eat enough or are taking other medications that assist to lower blood sugar. The signs of low blood sugar are:  o Weakness  o Shaky   o Hungry  o Sweating  o Confusion    See below for ways to treat low blood sugar without adding in lots of extra calories.    Treating Low Blood Sugar    If you have symptoms of low blood sugar (sweating, shaking, dizzy, confused) eat 15 grams of carbs and wait 15 minutes:      Glucose Tabs are best for sugars under 70 -  Dex4 or BD Glucose tablets are good, you will need to take 3-4 of these to equal 15 grams.       One small box of raisins    4 oz fruit juice box or   cup fruit juice    1 small apple    1 small banana      cup canned fruit in water      English muffin or a slice of bread with jelly     1 low fat frozen waffle with sugar-free syrup      cup cottage cheese with   cup frozen or fresh blueberries    1 cup skim or low-fat milk      cup whole grain cereal    4-6 crackers such as Triscuits    Please refer to the pharmacy insert for more information on  side-effects. Please call the clinic should you have more questions regarding this medication.      In order to get refills of this or any medication we prescribe you must be seen in the medical weight mgmt clinic every 2-3 months.        Protein Sources for Weight Loss  [email or web address]       Making Sense of Food Labels  [email or web address]      Tips for Weight Loss and Weight Management  [email or web address]

## 2021-02-02 NOTE — LETTER
"2021       RE: Jonathon Broussard  [address]  [address]  [facility] [address]     Dear Colleague,    Thank you for referring your patient, Jonathon Broussard, to the [facility] [place] at [facility]. Please see a copy of my visit note below.    Jonathon is a 40 year old who is being evaluated via a billable telephone visit.      What phone number would you like to be contacted at? [phone]  How would you like to obtain your AVS? Mail a copy  Phone call duration: 16 minutes    Return Medical Weight Management Note     Jonathon Broussard  MRN:  [id number]  :  1980  PO:  2021    Dear No primary care provider on file.,    I had the pleasure of seeing your patient Jonathon Broussard. She is a 40 year old female who I am continuing to see for treatment of obesity related to:       2020   I have the following health issues associated with obesity: High Blood Pressure, Sleep Apnea, GERD (Reflux), Asthma   I have the following symptoms associated with obesity: None of the above       Assessment & Plan   Problem List Items Addressed This Visit        Digestive    Morbid obesity (H) - Primary     topiramate decreased to 75mg twice daily to decreased mood at 100mg. Mood has improved, no issues. Continues to feel hungry \"all day\", education provided on decreasing calories by eating more protein and veggies. Will send education by mail in AVS. Add metformin 1000mg once daily to help with antipsychotic weight gain.          Relevant Medications    metFORMIN (GLUCOPHAGE-XR) 500 MG 24 hr tablet    topiramate (TOPAMAX) 25 MG tablet      Other Visit Diagnoses     Weight gain due to medication        Relevant Medications    metFORMIN (GLUCOPHAGE-XR) 500 MG 24 hr tablet         [facility] everywhere, hospitalization   Review of external notes as documented above       30 minutes spent on the date of the encounter doing chart review, " history and exam, documentation and further activities as noted above  0956}  MEDICATIONS:        - Trial of metformin taper up to 1000mg once daily for antipsychotic weight gain        - Continue other medications without change- topiramate 75mg twice daily   FUTURE APPOINTMENTS:       - Follow-up visit in 3 months, jennifer gómez or myself  -Discussed focusing on protein and vegetables at meals and snacks. Will send patient education by mail. Consider dietitian in future       INTERVAL HISTORY:  Last seen with Jennifer Gómez 11/19/2020- new to Me. At that time, stable on 75mg topiramate twice daily with good benefit. Weight stable since previous visit. topiramate increased to 100mg twice daily.   Since last seen, admission 1/7/2021 for suicidal ideation. Had worsening mood over the 2 months prior and had started lithium 2 weeks prior to admission.     Had been losing weight prior to January. Taking topiramate 75mg twice daily.     Feels hungry all day   Wake up around 10am- misses breakfast  Lunch 1145- at living facility  Dinner     Snacks available at the facility all the day - has at least 4/day. Chocolate, cheese sticks.     Does have the opportunity to go shopping. Not aware of good sources of protein/ veggies/ fruit.     Very frustrated with weight and body image.     Lives in [facility] assisted living     CURRENT WEIGHT:   226 lbs 12.8 oz    Initial Weight (lbs): 228 lbs  Last Visits Weight: 100.8 kg (222 lb 4.8 oz)  Cumulative weight loss (lbs): 1.2  Weight Loss Percentage: 0.53%     Wt Readings from Last 5 Encounters:   02/02/21 102.9 kg (226 lb 12.8 oz)   11/19/20 100.8 kg (222 lb 4.8 oz)   10/28/20 100.8 kg (222 lb 3.2 oz)   09/23/20 98.9 kg (218 lb)   08/05/20 98.9 kg (218 lb)        Changes and Difficulties 2/2/2021   I have made the following changes to my diet since my last visit: no   With regards to my diet, I am still struggling with: still hungry- frustrated   I have made the following changes to  "my activity/exercise since my last visit: getting out more walking   With regards to my activity/exercise, I am still struggling with: trying to get out more       VITALS:  Ht 1.626 m (5' 4\")   Wt 102.9 kg (226 lb 12.8 oz)   BMI 38.93 kg/m      MEDICATIONS:   Current Outpatient Medications   Medication Sig Dispense Refill     lithium (ESKALITH) 600 MG capsule Take 600 mg by mouth 3 times daily (with meals)       lurasidone (LATUDA) 40 MG TABS tablet Take by mouth daily with food       metFORMIN (GLUCOPHAGE-XR) 500 MG 24 hr tablet 1 tablet by mouth daily with meal for 1 week, then 2 tablets daily with meal. 60 tablet 3     topiramate (TOPAMAX) 25 MG tablet Take 3 tablets (75 mg) by mouth 2 times daily 120 tablet 3     amLODIPine (NORVASC) 10 MG tablet TAKE 1 TABLET BY MOUTH EVERY DAY DX HIGH BLOOD PRESSURE  11     atomoxetine (STRATTERA) 100 MG capsule One cap QAM (with one 25mg cap for total of 125mg QAM). Take after food.       atomoxetine (STRATTERA) 25 MG capsule One cap QAM (with one 100mg cap for total of 125mg QAM). Take after food.       benztropine (COGENTIN) 0.5 MG tablet Take 0.5 mg by mouth       carvedilol (COREG) 12.5 MG tablet TAKE 3 TABLETS (37.5mg) BY MOUTH TWICE DAILY DX HIGH BLOOD PRESSURE  11     cholecalciferol (VITAMIN D-1000 MAX ST) 1000 units TABS Take 1 tablet by mouth every 24 hours       cloZAPine (CLOZARIL) 100 MG tablet 400mg QHS. (4 tabs QHS)       diazepam (VALIUM) 10 MG tablet TAKE 1 TAB BY MOUTH THREE TIMES DAILY.IF TOO SEDATING ,CAN TAKE 1 2 TAB (5MG)  4     diphenhydrAMINE HCl 50 MG TABS One tab QID prn.       GABAPENTIN PO Take 1,200 mg by mouth 2 times daily Two tablets twice daily       hydrocortisone (CORTAID) 1 % external cream        LANsoprazole (PREVACID) 30 MG DR capsule Take 1 capsule (30 mg) by mouth every 24 hours 90 capsule 3     LEVOTHYROXINE SODIUM PO Take 112 mcg by mouth       lisinopril (PRINIVIL/ZESTRIL) 5 MG tablet TAKE 1 TAB BY MOUTH ONCE DAILY  11     " "LORazepam (ATIVAN) 2 MG tablet TAKE 1 TAB BY MOUTH THREE TIMES DAILY  4     Lubricants (SURGICAL LUBRICANT) external gel        LUBRICATING PLUS EYE DROPS 0.5 % SOLN ophthalmic solution INSTILL 1 DROP INTO EACH EYE FOUR TIMES DAILY  11     Multiple Vitamins-Minerals (CERTA-KONSTANTIN PO)        olopatadine (PATANOL) 0.1 % ophthalmic solution INSTILL 1 DROP TO BOTH EYES TWICE A DAY  5     oxybutynin (DITROPAN-XL) 10 MG 24 hr tablet TAKE 1 TAB BY MOUTH ONCE DAILY  99     Paliperidone (INVEGA PO) Take 9 mg by mouth       paliperidone (INVEGA SUSTENNA) 234 MG/1.5ML SUSP Inject 234 mg into the muscle       polyethylene glycol (MIRALAX) powder Take 17 g (1 capful) by mouth daily 510 g 11     SM NICOTINE POLACRILEX 2 MG gum CHEW 1 PIECE EVERY HOUR AS NEEDED FOR NICOTINE CRAVINGS  16     triamcinolone (KENALOG) 0.025 % cream APPLY TWICE DAILY TO RASH ON WRIST AND UNDER ARMPITS  1     VENLAFAXINE HCL PO Take 375 mg by mouth 2 times daily ER       VITAMIN D, CHOLECALCIFEROL, PO Take 2,000 Units by mouth daily          Weight Loss Medication History Reviewed With Patient 2/2/2021   Which weight loss medications are you currently taking on a regular basis?  Topamax (topiramate)   Are you having any side effects from the weight loss medication that we have prescribed you? No   If you are having side effects please describe: no       Orders Only on 07/23/2018   Component Date Value Ref Range Status     Specimen Description 07/23/2018 Feces   Final     C Diff Toxin B PCR 07/23/2018 Positive* NEG^Negative Final    Comment: Positive: Toxin producing Clostridium difficile target DNA sequences detected,   presumed positive for Clostridium difficile toxin B.  Clostridium difficile (Requires Enteric Isolation)  FDA approved assay performed using [employer] GeneXpert real-time PCR.           PHYSICAL EXAM:  Objective    Ht 1.626 m (5' 4\")   Wt 102.9 kg (226 lb 12.8 oz)   BMI 38.93 kg/m    Vitals - Patient Reported  Pain Score: No Pain " (0)        Physical Exam   Phone visit   PSYCH: Alert and oriented times 3; pressed, loud coherent speech, normal   rate and volume, able to articulate logical thoughts, able   to abstract reason, no tangential thoughts, no hallucinations   or delusions  Her affect is normal and labile   RESP: No cough, no audible wheezing, able to talk in full sentences  Remainder of exam unable to be completed due to telephone visits      Sincerely,    Jessica Contreras, NP

## 2021-02-02 NOTE — ASSESSMENT & PLAN NOTE
"topiramate decreased to 75mg twice daily to decreased mood at 100mg. Mood has improved, no issues. Continues to feel hungry \"all day\", education provided on decreasing calories by eating more protein and veggies. Will send education by mail in AVS. Add metformin 1000mg once daily to help with antipsychotic weight gain.   "

## 2021-02-02 NOTE — PROGRESS NOTES
"Jonathon is a 40 year old who is being evaluated via a billable telephone visit.      What phone number would you like to be contacted at? [phone]  How would you like to obtain your AVS? Mail a copy  Phone call duration: 16 minutes    Return Medical Weight Management Note     Jonathon Broussard  MRN:  [id number]  :  1980  PO:  2021    Dear No primary care provider on file.,    I had the pleasure of seeing your patient Jonathon Broussard. She is a 40 year old female who I am continuing to see for treatment of obesity related to:       2020   I have the following health issues associated with obesity: High Blood Pressure, Sleep Apnea, GERD (Reflux), Asthma   I have the following symptoms associated with obesity: None of the above       Assessment & Plan   Problem List Items Addressed This Visit        Digestive    Morbid obesity (H) - Primary     topiramate decreased to 75mg twice daily to decreased mood at 100mg. Mood has improved, no issues. Continues to feel hungry \"all day\", education provided on decreasing calories by eating more protein and veggies. Will send education by mail in AVS. Add metformin 1000mg once daily to help with antipsychotic weight gain.          Relevant Medications    metFORMIN (GLUCOPHAGE-XR) 500 MG 24 hr tablet    topiramate (TOPAMAX) 25 MG tablet      Other Visit Diagnoses     Weight gain due to medication        Relevant Medications    metFORMIN (GLUCOPHAGE-XR) 500 MG 24 hr tablet         [facility] everywhere, hospitalization   Review of external notes as documented above       30 minutes spent on the date of the encounter doing chart review, history and exam, documentation and further activities as noted above  0956}  MEDICATIONS:        - Trial of metformin taper up to 1000mg once daily for antipsychotic weight gain        - Continue other medications without change- topiramate 75mg twice daily   FUTURE APPOINTMENTS:       - Follow-up visit in 3 months, delaney gómez " "or myself  -Discussed focusing on protein and vegetables at meals and snacks. Will send patient education by mail. Consider dietitian in future       INTERVAL HISTORY:  Last seen with Jennifer Sanchez 11/19/2020- new to Me. At that time, stable on 75mg topiramate twice daily with good benefit. Weight stable since previous visit. topiramate increased to 100mg twice daily.   Since last seen, admission 1/7/2021 for suicidal ideation. Had worsening mood over the 2 months prior and had started lithium 2 weeks prior to admission.     Had been losing weight prior to January. Taking topiramate 75mg twice daily.     Feels hungry all day   Wake up around 10am- misses breakfast  Lunch 1145- at living facility  Dinner     Snacks available at the facility all the day - has at least 4/day. Chocolate, cheese sticks.     Does have the opportunity to go shopping. Not aware of good sources of protein/ veggies/ fruit.     Very frustrated with weight and body image.     Lives in [facility] assisted living     CURRENT WEIGHT:   226 lbs 12.8 oz    Initial Weight (lbs): 228 lbs  Last Visits Weight: 100.8 kg (222 lb 4.8 oz)  Cumulative weight loss (lbs): 1.2  Weight Loss Percentage: 0.53%     Wt Readings from Last 5 Encounters:   02/02/21 102.9 kg (226 lb 12.8 oz)   11/19/20 100.8 kg (222 lb 4.8 oz)   10/28/20 100.8 kg (222 lb 3.2 oz)   09/23/20 98.9 kg (218 lb)   08/05/20 98.9 kg (218 lb)        Changes and Difficulties 2/2/2021   I have made the following changes to my diet since my last visit: no   With regards to my diet, I am still struggling with: still hungry- frustrated   I have made the following changes to my activity/exercise since my last visit: getting out more walking   With regards to my activity/exercise, I am still struggling with: trying to get out more       VITALS:  Ht 1.626 m (5' 4\")   Wt 102.9 kg (226 lb 12.8 oz)   BMI 38.93 kg/m      MEDICATIONS:   Current Outpatient Medications   Medication Sig Dispense Refill     " lithium (ESKALITH) 600 MG capsule Take 600 mg by mouth 3 times daily (with meals)       lurasidone (LATUDA) 40 MG TABS tablet Take by mouth daily with food       metFORMIN (GLUCOPHAGE-XR) 500 MG 24 hr tablet 1 tablet by mouth daily with meal for 1 week, then 2 tablets daily with meal. 60 tablet 3     topiramate (TOPAMAX) 25 MG tablet Take 3 tablets (75 mg) by mouth 2 times daily 120 tablet 3     amLODIPine (NORVASC) 10 MG tablet TAKE 1 TABLET BY MOUTH EVERY DAY DX HIGH BLOOD PRESSURE  11     atomoxetine (STRATTERA) 100 MG capsule One cap QAM (with one 25mg cap for total of 125mg QAM). Take after food.       atomoxetine (STRATTERA) 25 MG capsule One cap QAM (with one 100mg cap for total of 125mg QAM). Take after food.       benztropine (COGENTIN) 0.5 MG tablet Take 0.5 mg by mouth       carvedilol (COREG) 12.5 MG tablet TAKE 3 TABLETS (37.5mg) BY MOUTH TWICE DAILY DX HIGH BLOOD PRESSURE  11     cholecalciferol (VITAMIN D-1000 MAX ST) 1000 units TABS Take 1 tablet by mouth every 24 hours       cloZAPine (CLOZARIL) 100 MG tablet 400mg QHS. (4 tabs QHS)       diazepam (VALIUM) 10 MG tablet TAKE 1 TAB BY MOUTH THREE TIMES DAILY.IF TOO SEDATING ,CAN TAKE 1 2 TAB (5MG)  4     diphenhydrAMINE HCl 50 MG TABS One tab QID prn.       GABAPENTIN PO Take 1,200 mg by mouth 2 times daily Two tablets twice daily       hydrocortisone (CORTAID) 1 % external cream        LANsoprazole (PREVACID) 30 MG DR capsule Take 1 capsule (30 mg) by mouth every 24 hours 90 capsule 3     LEVOTHYROXINE SODIUM PO Take 112 mcg by mouth       lisinopril (PRINIVIL/ZESTRIL) 5 MG tablet TAKE 1 TAB BY MOUTH ONCE DAILY  11     LORazepam (ATIVAN) 2 MG tablet TAKE 1 TAB BY MOUTH THREE TIMES DAILY  4     Lubricants (SURGICAL LUBRICANT) external gel        LUBRICATING PLUS EYE DROPS 0.5 % SOLN ophthalmic solution INSTILL 1 DROP INTO EACH EYE FOUR TIMES DAILY  11     Multiple Vitamins-Minerals (CERTA-KONSTANTIN PO)        olopatadine (PATANOL) 0.1 % ophthalmic solution  "INSTILL 1 DROP TO BOTH EYES TWICE A DAY  5     oxybutynin (DITROPAN-XL) 10 MG 24 hr tablet TAKE 1 TAB BY MOUTH ONCE DAILY  99     Paliperidone (INVEGA PO) Take 9 mg by mouth       paliperidone (INVEGA SUSTENNA) 234 MG/1.5ML SUSP Inject 234 mg into the muscle       polyethylene glycol (MIRALAX) powder Take 17 g (1 capful) by mouth daily 510 g 11     SM NICOTINE POLACRILEX 2 MG gum CHEW 1 PIECE EVERY HOUR AS NEEDED FOR NICOTINE CRAVINGS  16     triamcinolone (KENALOG) 0.025 % cream APPLY TWICE DAILY TO RASH ON WRIST AND UNDER ARMPITS  1     VENLAFAXINE HCL PO Take 375 mg by mouth 2 times daily ER       VITAMIN D, CHOLECALCIFEROL, PO Take 2,000 Units by mouth daily          Weight Loss Medication History Reviewed With Patient 2/2/2021   Which weight loss medications are you currently taking on a regular basis?  Topamax (topiramate)   Are you having any side effects from the weight loss medication that we have prescribed you? No   If you are having side effects please describe: no       Orders Only on 07/23/2018   Component Date Value Ref Range Status     Specimen Description 07/23/2018 Feces   Final     C Diff Toxin B PCR 07/23/2018 Positive* NEG^Negative Final    Comment: Positive: Toxin producing Clostridium difficile target DNA sequences detected,   presumed positive for Clostridium difficile toxin B.  Clostridium difficile (Requires Enteric Isolation)  FDA approved assay performed using [employer] GeneXpert real-time PCR.           PHYSICAL EXAM:  Objective    Ht 1.626 m (5' 4\")   Wt 102.9 kg (226 lb 12.8 oz)   BMI 38.93 kg/m    Vitals - Patient Reported  Pain Score: No Pain (0)        Physical Exam   Phone visit   PSYCH: Alert and oriented times 3; pressed, loud coherent speech, normal   rate and volume, able to articulate logical thoughts, able   to abstract reason, no tangential thoughts, no hallucinations   or delusions  Her affect is normal and labile   RESP: No cough, no audible wheezing, able to talk in full " sentences  Remainder of exam unable to be completed due to telephone visits      Sincerely,    Jessica Contreras NP

## 2021-02-02 NOTE — Clinical Note
Please mail avs. 3 links at bottom of AVS that will need to be printed and mailed with AVS. Thanks!

## 2021-02-02 NOTE — NURSING NOTE
"Chief Complaint   Patient presents with     Follow Up     Follow up Weight Management       Vitals:    02/02/21 1224   Weight: 102.9 kg (226 lb 12.8 oz)   Height: 1.626 m (5' 4\")       Body mass index is 38.93 kg/m .                            "

## 2021-02-05 ENCOUNTER — TELEPHONE (OUTPATIENT)
Dept: GASTROENTEROLOGY | Facility: CLINIC | Age: 41
End: 2021-02-05

## 2021-02-05 RX ORDER — LANSOPRAZOLE 30 MG/1
CAPSULE, DELAYED RELEASE ORAL
Qty: 90 CAPSULE | Refills: 0 | Status: SHIPPED | OUTPATIENT
Start: 2021-02-05 | End: 2021-05-10

## 2021-02-05 NOTE — TELEPHONE ENCOUNTER
Spoke with patient to schedule a follow-up with Aubrey Holden.  Patient stated they were doing good and did not want to schedule a follow-up appointment and denied needing a medication refilled.

## 2021-02-05 NOTE — TELEPHONE ENCOUNTER
Last visit [facility] 5/12/20. RTC plan 6 months. 90 RF x 1 and Scheduling has been notified to contact the pt for appointment.

## 2021-05-03 ENCOUNTER — VIRTUAL VISIT (OUTPATIENT)
Dept: ENDOCRINOLOGY | Facility: CLINIC | Age: 41
End: 2021-05-03
Payer: COMMERCIAL

## 2021-05-03 VITALS — BODY MASS INDEX: 43.21 KG/M2 | WEIGHT: 243.9 LBS | HEIGHT: 63 IN

## 2021-05-03 DIAGNOSIS — R63.5 WEIGHT GAIN DUE TO MEDICATION: ICD-10-CM

## 2021-05-03 DIAGNOSIS — T50.905A WEIGHT GAIN DUE TO MEDICATION: ICD-10-CM

## 2021-05-03 DIAGNOSIS — E66.01 MORBID OBESITY (H): ICD-10-CM

## 2021-05-03 PROCEDURE — 99213 OFFICE O/P EST LOW 20 MIN: CPT | Mod: TEL | Performed by: NURSE PRACTITIONER

## 2021-05-03 RX ORDER — CYCLOBENZAPRINE HCL 10 MG
10 TABLET ORAL 2 TIMES DAILY
COMMUNITY
Start: 2021-04-06 | End: 2021-07-12

## 2021-05-03 RX ORDER — METFORMIN HCL 500 MG
1000 TABLET, EXTENDED RELEASE 24 HR ORAL 2 TIMES DAILY WITH MEALS
Qty: 120 TABLET | Refills: 3 | Status: SHIPPED | OUTPATIENT
Start: 2021-05-03 | End: 2021-07-07

## 2021-05-03 RX ORDER — MULTIVITAMIN WITH FOLIC ACID 400 MCG
1 TABLET ORAL AT BEDTIME
COMMUNITY
Start: 2021-04-28

## 2021-05-03 RX ORDER — MIRABEGRON 25 MG/1
TABLET, FILM COATED, EXTENDED RELEASE ORAL
COMMUNITY
Start: 2021-04-20

## 2021-05-03 ASSESSMENT — PAIN SCALES - GENERAL: PAINLEVEL: NO PAIN (0)

## 2021-05-03 ASSESSMENT — MIFFLIN-ST. JEOR: SCORE: 1745.45

## 2021-05-03 NOTE — PATIENT INSTRUCTIONS
"It was a pleasure meeting with you today.    Thank you for allowing us the privilege of caring for you. We hope we provided you with the excellent service you deserve.   Please let us know if there is anything else we can do for you so that we can be sure you are leaving completely satisfied with your care experience.    To ensure the quality of our services you may be receiving a patient satisfaction survey from an independent patient satisfaction monitoring company.    The greatest compliment you can give is a \"Likely to Recommend\"      Your visit was with Jessica Contreras NP today.     Instructions per today's visit:    MEDICATIONS:        - Increase metformin to 1000mg twice daily        - Continue other medications without change       - talk to psychiatrist about increasing topiramate to 100mg twice daily, we decreased it back in Feb because you were having suicidal ideation and we thought it was from the topiramate increase     FUTURE APPOINTMENTS:       - Follow-up visit in 3 months       To schedule appointments with our team, please call [phone] option #1    Please call during clinic hours Monday through Friday 8:00a - 4:00p if you have questions or you can contact us via [employer] at anytime.      Nurses: [phone]  Fax: [phone]   _________________________________________________________________________________________Healthy Lifestyle Support Group   [facility] Weight [facility]  Virtual Support Group Now Available    60 minutes of small group guided discussion, support and resources    Led by Health , Ester Byrd    For questions, and to receive the invite information, contact Ester at [email or web address]    All our welcome!    One Friday per month, 12:30pm to 1:30pm  SELF COMPASSION: July 31st  CREATING MY WELLNESS VISION: August 28th  MINDFULNESS PRACTICES FOR A CALM BODY & MIND: September 25th  MINDFUL EATING TOOLS: October 30th  HEALTHY& HAPPY HOLIDAYS: November 20th  OPEN " FORUM: December 18th  _______________________________________________________________________________________________________________________________  __________      Interested in working with a health ?  Health coaches work with you to improve your overall health and wellbeing.  They look at the whole person, and may involve discussion of different areas of life, including, but not limited to the four pillars of health (sleep, exercise, nutrition, and stress management). Discuss with your care team if you would like to start working a health .     Health Coaching-3 Pack:      $99 for three health coaching visits    Visits may be done in person or via phone    Coaching is a partnership between the  and the client; Coaches do not prescribe or diagnose    Coaching helps inspire the client to reach his/her personal goals   __________________________________________________________________________________________________________________________________

## 2021-05-03 NOTE — LETTER
5/3/2021       RE: Jonathon Broussard  [address]  [address]  [facility] [address]     Dear Colleague,    Thank you for referring your patient, Jonathon Broussard, to the [facility]. Please see a copy of my visit note below.    Return Medical Weight Management Note     Jonathon Broussard  MRN:  [id number]  :  1980  PO:  5/3/2021    Dear No primary care provider on file.,    I had the pleasure of seeing your patient Jonathon Broussard. She is a 40 year old female who I am continuing to see for treatment of obesity related to:       2020   I have the following health issues associated with obesity: High Blood Pressure, Sleep Apnea, GERD (Reflux), Asthma   I have the following symptoms associated with obesity: None of the above       Assessment & Plan   Problem List Items Addressed This Visit        Digestive    Morbid obesity (H)     Continued weight gain with antipsychotic medication adjustments since hospitalization 2021. Working on making good choices for snacks. Using some meal replacements- slim fast.     Tolerating metformin 1000mg without side effect. Will increase to 1000mg twice daily given continued antipsychotic weight gain.     Patient would like to go back up on the topiramate. 2021- increased SI and hospitalized within a few weeks of topiramate increase. Patient feels she was not on the right medications at that time and that it wasn't topiramate. Encouraged her to discuss with psychiatry first about increase.              Relevant Medications    metFORMIN (GLUCOPHAGE-XR) 500 MG 24 hr tablet      Other Visit Diagnoses     Weight gain due to medication        Relevant Medications    metFORMIN (GLUCOPHAGE-XR) 500 MG 24 hr tablet             24 minutes spent on the date of the encounter doing chart review, history and exam, documentation and further activities per the  "note  0956}  MEDICATIONS:        - Increase metformin to 1000mg twice daily        - Continue other medications without change       - talk to psychiatrist about increasing topiramate to 100mg twice daily, we decreased it back in Feb because you were having suicidal ideation and we thought it was from the topiramate increase     FUTURE APPOINTMENTS:       - Follow-up visit in 3 months     Lives in [facility]. Body image big issue. Hx SI 1/7/2021. Hx bipolar1  INTERVAL HISTORY:  Last seen 2/2/2021- doing better at 75mg topiramate twice daily (improved mood), added metformin 1000mg at meal for antipsychotic weight gain. Discussed increasing protein and veggies for better satiety.      A1C 1/2021 5.4     \"bipolar\" has been \"ele\" recently, hospitalized for this x 1 week. Adjusting medications but not having much luck.     Switched to fruit instead of candy as snacks. Doing slimfast two times a day and then a meal for dinner. Has increased walking.     CURRENT WEIGHT:   243 lbs 14.4 oz    Initial Weight (lbs): 228 lbs  Last Visits Weight: 102.9 kg (226 lb 12.8 oz)  Cumulative weight loss (lbs): -15.9  Weight Loss Percentage: -6.97%    Changes and Difficulties 5/3/2021   I have made the following changes to my diet since my last visit: I started eating more fruits and veggies, and have cut back on nicotine gum   With regards to my diet, I am still struggling with: none   I have made the following changes to my activity/exercise since my last visit: I started going on 2 mile walks with my boyfriend 2 times a week   With regards to my activity/exercise, I am still struggling with: none       VITALS:  Ht 1.6 m (5' 3\")   Wt 110.6 kg (243 lb 14.4 oz)   BMI 43.20 kg/m      MEDICATIONS:   Current Outpatient Medications   Medication Sig Dispense Refill     amLODIPine (NORVASC) 10 MG tablet TAKE 1 TABLET BY MOUTH EVERY DAY DX HIGH BLOOD PRESSURE  11     atomoxetine (STRATTERA) 100 MG capsule One cap QAM (with " one 25mg cap for total of 125mg QAM). Take after food.       atomoxetine (STRATTERA) 25 MG capsule One cap QAM (with one 100mg cap for total of 125mg QAM). Take after food.       benztropine (COGENTIN) 0.5 MG tablet Take 0.5 mg by mouth       carvedilol (COREG) 12.5 MG tablet TAKE 3 TABLETS (37.5mg) BY MOUTH TWICE DAILY DX HIGH BLOOD PRESSURE  11     cholecalciferol (VITAMIN D-1000 MAX ST) 1000 units TABS Take 1 tablet by mouth every 24 hours       cloZAPine (CLOZARIL) 100 MG tablet 400mg QHS. (4 tabs QHS)       cyclobenzaprine (FLEXERIL) 10 MG tablet Take 10 mg by mouth 2 times daily       diazepam (VALIUM) 10 MG tablet TAKE 1 TAB BY MOUTH THREE TIMES DAILY.IF TOO SEDATING ,CAN TAKE 1 2 TAB (5MG)  4     diphenhydrAMINE HCl 50 MG TABS One tab QID prn.       GABAPENTIN PO Take 1,200 mg by mouth 2 times daily Two tablets twice daily       hydrocortisone (CORTAID) 1 % external cream        LANsoprazole (PREVACID) 30 MG DR capsule TAKE 1 CAP BY MOUTH ONCE DAILY 30 MINS PRIOR TO FIRST MEAL OF THE DAY 90 capsule 0     LEVOTHYROXINE SODIUM PO Take 112 mcg by mouth       lisinopril (PRINIVIL/ZESTRIL) 5 MG tablet TAKE 1 TAB BY MOUTH ONCE DAILY  11     lithium (ESKALITH) 600 MG capsule Take 1,200 mg by mouth daily        LORazepam (ATIVAN) 2 MG tablet TAKE 1 TAB BY MOUTH THREE TIMES DAILY  4     Lubricants (SURGICAL LUBRICANT) external gel        LUBRICATING PLUS EYE DROPS 0.5 % SOLN ophthalmic solution INSTILL 1 DROP INTO EACH EYE FOUR TIMES DAILY  11     lurasidone (LATUDA) 40 MG TABS tablet Take by mouth daily with food       melatonin 5 MG tablet Take 5 mg by mouth       metFORMIN (GLUCOPHAGE-XR) 500 MG 24 hr tablet Take 2 tablets (1,000 mg) by mouth 2 times daily (with meals) 120 tablet 3     Multiple Vitamin (DAILY-KONSTANTIN) TABS Take 1 tablet by mouth At Bedtime       Multiple Vitamins-Minerals (CERTA-KONSTANTIN PO)        MYRBETRIQ 25 MG 24 hr tablet TAKE 1 TAB BY MOUTH ONCE DAILY       olopatadine (PATANOL) 0.1 % ophthalmic  "solution INSTILL 1 DROP TO BOTH EYES TWICE A DAY  5     oxybutynin (DITROPAN-XL) 10 MG 24 hr tablet TAKE 1 TAB BY MOUTH ONCE DAILY  99     Paliperidone (INVEGA PO) Take 9 mg by mouth       paliperidone (INVEGA SUSTENNA) 234 MG/1.5ML SUSP Inject 234 mg into the muscle       polyethylene glycol (MIRALAX) powder Take 17 g (1 capful) by mouth daily 510 g 11     SM NICOTINE POLACRILEX 2 MG gum CHEW 1 PIECE EVERY HOUR AS NEEDED FOR NICOTINE CRAVINGS  16     topiramate (TOPAMAX) 25 MG tablet Take 3 tablets (75 mg) by mouth 2 times daily 120 tablet 3     triamcinolone (KENALOG) 0.025 % cream APPLY TWICE DAILY TO RASH ON WRIST AND UNDER ARMPITS  1     VENLAFAXINE HCL PO Take 375 mg by mouth 2 times daily ER       VITAMIN D, CHOLECALCIFEROL, PO Take 2,000 Units by mouth daily          Weight Loss Medication History Reviewed With Patient 5/3/2021   Which weight loss medications are you currently taking on a regular basis?  Topamax (topiramate)   Are you having any side effects from the weight loss medication that we have prescribed you? No   If you are having side effects please describe: -       Orders Only on 07/23/2018   Component Date Value Ref Range Status     Specimen Description 07/23/2018 Feces   Final     C Diff Toxin B PCR 07/23/2018 Positive* NEG^Negative Final    Comment: Positive: Toxin producing Clostridium difficile target DNA sequences detected,   presumed positive for Clostridium difficile toxin B.  Clostridium difficile (Requires Enteric Isolation)  FDA approved assay performed using [employer] GeneXpert real-time PCR.           PHYSICAL EXAM:  Objective    Ht 1.6 m (5' 3\")   Wt 110.6 kg (243 lb 14.4 oz)   BMI 43.20 kg/m    Vitals - Patient Reported  Pain Score: No Pain (0)      Vitals:  No vitals were obtained today due to virtual visit.    Physical Exam   healthy, alert and no distress  PSYCH: Alert and oriented times 3; coherent speech, normal   rate and volume, able to articulate logical thoughts, able "   to abstract reason, no tangential thoughts, no hallucinations   or delusions  Her affect is normal  RESP: No cough, no audible wheezing, able to talk in full sentences  Remainder of exam unable to be completed due to telephone visits      Sincerely,    Jessica Contreras NP    Jonathon is a 40 year old who is being evaluated via a billable telephone visit.      What phone number would you like to be contacted at? [phone]  How would you like to obtain your AVS? Mail  Phone call duration: 13 minutes

## 2021-05-03 NOTE — PROGRESS NOTES
Jonathon is a 40 year old who is being evaluated via a billable telephone visit.      What phone number would you like to be contacted at? [phone]  How would you like to obtain your AVS? Mail  Phone call duration: 13 minutes

## 2021-05-03 NOTE — NURSING NOTE
"Chief Complaint   Patient presents with     RECHECK     Follow up mwm.       Vitals:    05/03/21 1503   Weight: 110.6 kg (243 lb 14.4 oz)   Height: 1.6 m (5' 3\")       Body mass index is 43.2 kg/m .                            CHRISTINA MAURER, EMT    "

## 2021-05-03 NOTE — PROGRESS NOTES
Return Medical Weight Management Note     Jonathon Broussard  MRN:  [id number]  :  1980  PO:  5/3/2021    Dear No primary care provider on file.,    I had the pleasure of seeing your patient Jonathon Broussard. She is a 40 year old female who I am continuing to see for treatment of obesity related to:       2020   I have the following health issues associated with obesity: High Blood Pressure, Sleep Apnea, GERD (Reflux), Asthma   I have the following symptoms associated with obesity: None of the above       Assessment & Plan   Problem List Items Addressed This Visit        Digestive    Morbid obesity (H)     Continued weight gain with antipsychotic medication adjustments since hospitalization 2021. Working on making good choices for snacks. Using some meal replacements- slim fast.     Tolerating metformin 1000mg without side effect. Will increase to 1000mg twice daily given continued antipsychotic weight gain.     Patient would like to go back up on the topiramate. 2021- increased SI and hospitalized within a few weeks of topiramate increase. Patient feels she was not on the right medications at that time and that it wasn't topiramate. Encouraged her to discuss with psychiatry first about increase.              Relevant Medications    metFORMIN (GLUCOPHAGE-XR) 500 MG 24 hr tablet      Other Visit Diagnoses     Weight gain due to medication        Relevant Medications    metFORMIN (GLUCOPHAGE-XR) 500 MG 24 hr tablet             24 minutes spent on the date of the encounter doing chart review, history and exam, documentation and further activities per the note  0956}  MEDICATIONS:        - Increase metformin to 1000mg twice daily        - Continue other medications without change       - talk to psychiatrist about increasing topiramate to 100mg twice daily, we decreased it back in Feb because you were having suicidal ideation and we thought it was from the topiramate increase     FUTURE APPOINTMENTS:    "    - Follow-up visit in 3 months     Lives in [facility] living. Body image big issue. Hx SI 1/7/2021. Hx bipolar1  INTERVAL HISTORY:  Last seen 2/2/2021- doing better at 75mg topiramate twice daily (improved mood), added metformin 1000mg at meal for antipsychotic weight gain. Discussed increasing protein and veggies for better satiety.      A1C 1/2021 5.4     \"bipolar\" has been \"ele\" recently, hospitalized for this x 1 week. Adjusting medications but not having much luck.     Switched to fruit instead of candy as snacks. Doing slimfast two times a day and then a meal for dinner. Has increased walking.     CURRENT WEIGHT:   243 lbs 14.4 oz    Initial Weight (lbs): 228 lbs  Last Visits Weight: 102.9 kg (226 lb 12.8 oz)  Cumulative weight loss (lbs): -15.9  Weight Loss Percentage: -6.97%    Changes and Difficulties 5/3/2021   I have made the following changes to my diet since my last visit: I started eating more fruits and veggies, and have cut back on nicotine gum   With regards to my diet, I am still struggling with: none   I have made the following changes to my activity/exercise since my last visit: I started going on 2 mile walks with my boyfriend 2 times a week   With regards to my activity/exercise, I am still struggling with: none       VITALS:  Ht 1.6 m (5' 3\")   Wt 110.6 kg (243 lb 14.4 oz)   BMI 43.20 kg/m      MEDICATIONS:   Current Outpatient Medications   Medication Sig Dispense Refill     amLODIPine (NORVASC) 10 MG tablet TAKE 1 TABLET BY MOUTH EVERY DAY DX HIGH BLOOD PRESSURE  11     atomoxetine (STRATTERA) 100 MG capsule One cap QAM (with one 25mg cap for total of 125mg QAM). Take after food.       atomoxetine (STRATTERA) 25 MG capsule One cap QAM (with one 100mg cap for total of 125mg QAM). Take after food.       benztropine (COGENTIN) 0.5 MG tablet Take 0.5 mg by mouth       carvedilol (COREG) 12.5 MG tablet TAKE 3 TABLETS (37.5mg) BY MOUTH TWICE DAILY DX HIGH BLOOD PRESSURE  11     " cholecalciferol (VITAMIN D-1000 MAX ST) 1000 units TABS Take 1 tablet by mouth every 24 hours       cloZAPine (CLOZARIL) 100 MG tablet 400mg QHS. (4 tabs QHS)       cyclobenzaprine (FLEXERIL) 10 MG tablet Take 10 mg by mouth 2 times daily       diazepam (VALIUM) 10 MG tablet TAKE 1 TAB BY MOUTH THREE TIMES DAILY.IF TOO SEDATING ,CAN TAKE 1 2 TAB (5MG)  4     diphenhydrAMINE HCl 50 MG TABS One tab QID prn.       GABAPENTIN PO Take 1,200 mg by mouth 2 times daily Two tablets twice daily       hydrocortisone (CORTAID) 1 % external cream        LANsoprazole (PREVACID) 30 MG DR capsule TAKE 1 CAP BY MOUTH ONCE DAILY 30 MINS PRIOR TO FIRST MEAL OF THE DAY 90 capsule 0     LEVOTHYROXINE SODIUM PO Take 112 mcg by mouth       lisinopril (PRINIVIL/ZESTRIL) 5 MG tablet TAKE 1 TAB BY MOUTH ONCE DAILY  11     lithium (ESKALITH) 600 MG capsule Take 1,200 mg by mouth daily        LORazepam (ATIVAN) 2 MG tablet TAKE 1 TAB BY MOUTH THREE TIMES DAILY  4     Lubricants (SURGICAL LUBRICANT) external gel        LUBRICATING PLUS EYE DROPS 0.5 % SOLN ophthalmic solution INSTILL 1 DROP INTO EACH EYE FOUR TIMES DAILY  11     lurasidone (LATUDA) 40 MG TABS tablet Take by mouth daily with food       melatonin 5 MG tablet Take 5 mg by mouth       metFORMIN (GLUCOPHAGE-XR) 500 MG 24 hr tablet Take 2 tablets (1,000 mg) by mouth 2 times daily (with meals) 120 tablet 3     Multiple Vitamin (DAILY-KONSTANTIN) TABS Take 1 tablet by mouth At Bedtime       Multiple Vitamins-Minerals (CERTA-KONSTANTIN PO)        MYRBETRIQ 25 MG 24 hr tablet TAKE 1 TAB BY MOUTH ONCE DAILY       olopatadine (PATANOL) 0.1 % ophthalmic solution INSTILL 1 DROP TO BOTH EYES TWICE A DAY  5     oxybutynin (DITROPAN-XL) 10 MG 24 hr tablet TAKE 1 TAB BY MOUTH ONCE DAILY  99     Paliperidone (INVEGA PO) Take 9 mg by mouth       paliperidone (INVEGA SUSTENNA) 234 MG/1.5ML SUSP Inject 234 mg into the muscle       polyethylene glycol (MIRALAX) powder Take 17 g (1 capful) by mouth daily 510 g 11  "    SM NICOTINE POLACRILEX 2 MG gum CHEW 1 PIECE EVERY HOUR AS NEEDED FOR NICOTINE CRAVINGS  16     topiramate (TOPAMAX) 25 MG tablet Take 3 tablets (75 mg) by mouth 2 times daily 120 tablet 3     triamcinolone (KENALOG) 0.025 % cream APPLY TWICE DAILY TO RASH ON WRIST AND UNDER ARMPITS  1     VENLAFAXINE HCL PO Take 375 mg by mouth 2 times daily ER       VITAMIN D, CHOLECALCIFEROL, PO Take 2,000 Units by mouth daily          Weight Loss Medication History Reviewed With Patient 5/3/2021   Which weight loss medications are you currently taking on a regular basis?  Topamax (topiramate)   Are you having any side effects from the weight loss medication that we have prescribed you? No   If you are having side effects please describe: -       Orders Only on 07/23/2018   Component Date Value Ref Range Status     Specimen Description 07/23/2018 Feces   Final     C Diff Toxin B PCR 07/23/2018 Positive* NEG^Negative Final    Comment: Positive: Toxin producing Clostridium difficile target DNA sequences detected,   presumed positive for Clostridium difficile toxin B.  Clostridium difficile (Requires Enteric Isolation)  FDA approved assay performed using [employer] GeneXpert real-time PCR.           PHYSICAL EXAM:  Objective    Ht 1.6 m (5' 3\")   Wt 110.6 kg (243 lb 14.4 oz)   BMI 43.20 kg/m    Vitals - Patient Reported  Pain Score: No Pain (0)      Vitals:  No vitals were obtained today due to virtual visit.    Physical Exam   healthy, alert and no distress  PSYCH: Alert and oriented times 3; coherent speech, normal   rate and volume, able to articulate logical thoughts, able   to abstract reason, no tangential thoughts, no hallucinations   or delusions  Her affect is normal  RESP: No cough, no audible wheezing, able to talk in full sentences  Remainder of exam unable to be completed due to telephone visits      Sincerely,    Jessica Contreras NP  "

## 2021-05-04 NOTE — ASSESSMENT & PLAN NOTE
Continued weight gain with antipsychotic medication adjustments since hospitalization 1/2021. Working on making good choices for snacks. Using some meal replacements- slim fast.     Tolerating metformin 1000mg without side effect. Will increase to 1000mg twice daily given continued antipsychotic weight gain.     Patient would like to go back up on the topiramate. 1/2021- increased SI and hospitalized within a few weeks of topiramate increase. Patient feels she was not on the right medications at that time and that it wasn't topiramate. Encouraged her to discuss with psychiatry first about increase.

## 2021-05-07 ENCOUNTER — TELEPHONE (OUTPATIENT)
Dept: ENDOCRINOLOGY | Facility: CLINIC | Age: 41
End: 2021-05-07

## 2021-05-07 DIAGNOSIS — K21.9 GASTROESOPHAGEAL REFLUX DISEASE: ICD-10-CM

## 2021-05-10 RX ORDER — LANSOPRAZOLE 30 MG/1
CAPSULE, DELAYED RELEASE ORAL
Qty: 90 CAPSULE | Refills: 3 | Status: SHIPPED | OUTPATIENT
Start: 2021-05-10

## 2021-05-10 NOTE — TELEPHONE ENCOUNTER
LANSOPRAZOLE 30MG DR CAPSULE   Last Written Prescription Date:  2/5/2021  Last Fill Quantity: 90,   # refills: 0  Last Office Visit : 5/3/2021  Future Office visit:  None  90 Tabs, 3 Refills sent to pharm 5/10/2021    Sury Navarrete RN  Central Triage Red Flags/Med Refills

## 2021-05-10 NOTE — TELEPHONE ENCOUNTER
Left message on confidential nurse line.  No increase in Topiramate due to side effects last time the medication was increased.  Patient is to increase metformin to 1000mg twice a day.

## 2021-05-11 DIAGNOSIS — E66.01 MORBID OBESITY (H): ICD-10-CM

## 2021-05-11 RX ORDER — TOPIRAMATE 25 MG/1
75 TABLET, FILM COATED ORAL 2 TIMES DAILY
Qty: 180 TABLET | Refills: 2 | Status: SHIPPED | OUTPATIENT
Start: 2021-05-11 | End: 2021-07-07

## 2021-05-11 NOTE — TELEPHONE ENCOUNTER
Pt needs refill of Topamax sent over to [id number] - [place], [place] - [address]. Pt only has 3 days left until she is out.    [phone] - Confirmed

## 2021-05-11 NOTE — TELEPHONE ENCOUNTER
Last Clinic Visit: 5/3/21, [place] 7/6/21  Last CBC, ALT/AST in care everywhere 1/8/21  Last dictation note:  Patient would like to go back up on the topiramate. 1/2021- increased SI and hospitalized within a few weeks of topiramate increase. Patient feels she was not on the right medications at that time and that it wasn't topiramate. Encouraged her to discuss with psychiatry first about increase.   MEDICATIONS:        - Increase metformin to 1000mg twice daily        - Continue other medications without change       - talk to psychiatrist about increasing topiramate to 100mg twice daily, we decreased it back in Feb because you were having suicidal ideation and we thought it was from the topiramate increase     Call to Jonathon, reports is taking 3 tabs twice daily.  Advised prescription sent to pharmacy

## 2021-05-26 NOTE — TELEPHONE ENCOUNTER
DIAGNOSIS: mid-low back pain 3 months/ self ref/ no prior surgery, no imaging   APPOINTMENT DATE: 6.1.21   NOTES STATUS DETAILS   OFFICE NOTE from referring provider N/A    OFFICE NOTE from other specialist N/A    DISCHARGE SUMMARY from hospital N/A    DISCHARGE REPORT from the ER N/A    OPERATIVE REPORT N/A    EMG report N/A    MEDICATION LIST Internal    MRI N/A    DEXA (osteoporosis/bone health) N/A    CT SCAN N/A    XRAYS (IMAGES & REPORTS) N/A

## 2021-05-28 NOTE — PROGRESS NOTES
ASSESSMENT/PLAN:    (M54.6,  G89.29) Chronic midline thoracic back pain  (primary encounter diagnosis)  Comment: majority of pain seems postural in nature; will have her do some strengthening and postural training   Plan: XR Thoracic Spine 2 Views, PHYSICAL THERAPY REFERRAL (Internal)          (M54.5,  G89.29) Chronic bilateral low back pain without sciatica  Comment: see above   Plan: X-ray Lumbar Spine 2-3 views*, PHYSICAL THERAPY REFERRAL (Internal)          Jarad Quintero MD  June 1, 2021  2:22 PM        Pt is a 41 year old female here today for:     Back pain:   Location: Bilateral mid to low back -> pain is mostly on the sides   Duration: Months   Radiation: No  Numbness/ Tingling? No   Weakness? No  Flexion or Extension bias: Extension  Red flags? No  Meds tried? Nothing has been tried.  PT? No  Imaging? No    Can only take short walks w/o back feeling like its going to give out  When she does laundry, has to sit on chairs in hallway because she feels like the back is going to give out     Past Medical History:   Diagnosis Date     ADHD (attention deficit hyperactivity disorder)      Bipolar I disorder, most recent episode (or current) unspecified '98    initially dx as depression, then bipolar     Calculus of gallbladder without mention of cholecystitis or obstruction 5/2015     Cannabis dependence, in remission (H) 3/04    treatment x2, last use 6/06     Fibromyalgia      Gastro-oesophageal reflux disease      HTN (hypertension)      Juvenile idiopathic arthritis (H)      OCD (obsessive compulsive disorder)      Snores      Thyroid disease     hypothyroidism      Past Surgical History:   Procedure Laterality Date     HC TOOTH EXTRACTION W/FORCEP  1995     LAPAROSCOPIC CHOLECYSTECTOMY N/A 6/5/2015    Procedure: LAPAROSCOPIC CHOLECYSTECTOMY;  Surgeon: Jacob Hobson MD;  Location: [facility]      Current Outpatient Medications   Medication Sig Dispense Refill     ACETAMINOPHEN EXTRA STRENGTH 500 MG tablet  TAKE 1 TAB BY MOUTH THREE TIMES DAILY       amLODIPine (NORVASC) 10 MG tablet TAKE 1 TABLET BY MOUTH EVERY DAY DX HIGH BLOOD PRESSURE  11     azelastine (OPTIVAR) 0.05 % ophthalmic solution INSTILL 1 DROP INTO BOTH EYES TWICE DAILY       clindamycin (CLEOCIN T) 1 % external lotion        cloZAPine (CLOZARIL) 100 MG tablet 400mg QHS. (4 tabs QHS)       cyclobenzaprine (FLEXERIL) 10 MG tablet Take 10 mg by mouth 2 times daily       diazepam (VALIUM) 10 MG tablet TAKE 1 TAB BY MOUTH THREE TIMES DAILY.IF TOO SEDATING ,CAN TAKE 1 2 TAB (5MG)  4     LANsoprazole (PREVACID) 30 MG DR capsule TAKE 1 CAP BY MOUTH ONCE DAILY 30 MINS PRIOR TO FIRST MEAL OF THE DAY 90 capsule 3     lisinopril (PRINIVIL/ZESTRIL) 5 MG tablet TAKE 1 TAB BY MOUTH ONCE DAILY  11     lurasidone (LATUDA) 40 MG TABS tablet Take by mouth daily with food       melatonin 5 MG tablet Take 5 mg by mouth       Paliperidone (INVEGA PO) Take 9 mg by mouth       topiramate (TOPAMAX) 25 MG tablet Take 3 tablets (75 mg) by mouth 2 times daily 180 tablet 2     triamcinolone (KENALOG) 0.1 % external cream APPLY TOPICALLY TO BOTH HANDS TWICE DAILY AS NEEDED FOR 2 WEEKS AT A TIME       valACYclovir (VALTREX) 500 MG tablet TAKE 1 TAB BY MOUTH DAILY       VITAMIN D, CHOLECALCIFEROL, PO Take 2,000 Units by mouth daily        atomoxetine (STRATTERA) 100 MG capsule One cap QAM (with one 25mg cap for total of 125mg QAM). Take after food.       atomoxetine (STRATTERA) 25 MG capsule One cap QAM (with one 100mg cap for total of 125mg QAM). Take after food.       benztropine (COGENTIN) 0.5 MG tablet Take 0.5 mg by mouth       carvedilol (COREG) 12.5 MG tablet TAKE 3 TABLETS (37.5mg) BY MOUTH TWICE DAILY DX HIGH BLOOD PRESSURE  11     cholecalciferol (VITAMIN D-1000 MAX ST) 1000 units TABS Take 1 tablet by mouth every 24 hours       diphenhydrAMINE HCl 50 MG TABS One tab QID prn.       GABAPENTIN PO Take 1,200 mg by mouth 2 times daily Two tablets twice daily        "hydrocortisone (CORTAID) 1 % external cream        LEVOTHYROXINE SODIUM PO Take 112 mcg by mouth       lithium (ESKALITH) 600 MG capsule Take 1,200 mg by mouth daily        LORazepam (ATIVAN) 2 MG tablet TAKE 1 TAB BY MOUTH THREE TIMES DAILY  4     Lubricants (SURGICAL LUBRICANT) external gel        LUBRICATING PLUS EYE DROPS 0.5 % SOLN ophthalmic solution INSTILL 1 DROP INTO EACH EYE FOUR TIMES DAILY  11     metFORMIN (GLUCOPHAGE-XR) 500 MG 24 hr tablet Take 2 tablets (1,000 mg) by mouth 2 times daily (with meals) 120 tablet 3     Multiple Vitamin (DAILY-KONSTANTIN) TABS Take 1 tablet by mouth At Bedtime       Multiple Vitamins-Minerals (CERTA-KONSTANTIN PO)        MYRBETRIQ 25 MG 24 hr tablet TAKE 1 TAB BY MOUTH ONCE DAILY       nystatin-triamcinolone (MYCOLOG) 050483-0.1 UNIT/GM-% external ointment APPLY TO AFFECTED AREA(S) ON LABIA TWICE DAILY FOR ONE WEEK FOR CANDIDA       olopatadine (PATANOL) 0.1 % ophthalmic solution INSTILL 1 DROP TO BOTH EYES TWICE A DAY  5     oxybutynin (DITROPAN-XL) 10 MG 24 hr tablet TAKE 1 TAB BY MOUTH ONCE DAILY  99     paliperidone (INVEGA SUSTENNA) 234 MG/1.5ML SUSP Inject 234 mg into the muscle       polyethylene glycol (MIRALAX) powder Take 17 g (1 capful) by mouth daily 510 g 11     SM NICOTINE POLACRILEX 2 MG gum CHEW 1 PIECE EVERY HOUR AS NEEDED FOR NICOTINE CRAVINGS  16     triamcinolone (KENALOG) 0.025 % cream APPLY TWICE DAILY TO RASH ON WRIST AND UNDER ARMPITS  1     VENLAFAXINE HCL PO Take 375 mg by mouth 2 times daily ER        Allergies   Allergen Reactions     Haldol [Haloperidol] Tinnitus     Zyprexa [Olanzapine] Visual Disturbance and Other (See Comments)     akathesia     Seasonal Allergies       ROS:   Gen- no fevers/chills   Rheum - no morning stiffness   Derm - no rash/ redness   Neuro - no numbness, no tingling   Remainder of ROS negative.     Exam:   Ht 1.615 m (5' 3.58\")   Wt 111.2 kg (245 lb 1.6 oz)   BMI 42.62 kg/m         BACK:   ROM: flexion -full /extension -full " /lateral rotation- full /side bend- full   Bony tenderness: midline thoracic to lumbar  Paraspinal tenderness: bilateral   Sensation: intact in b/l lower extremities.   Strength: 5/5 w/ dorsiflexion/ plantarflexion/ inversion/ eversion/ knee flexion/ extension.   Maneuvers: Neg straight leg raise b/l. Neg Slump b/l Neg JIMMY   Neuro: DTR's 2+ Neg Clonus       [place] [facility] [place]-sp[place] - looks osteopenic; +degenerative changes  l-spine - negative

## 2021-06-01 ENCOUNTER — ANCILLARY PROCEDURE (OUTPATIENT)
Dept: GENERAL RADIOLOGY | Facility: CLINIC | Age: 41
End: 2021-06-01
Attending: FAMILY MEDICINE
Payer: COMMERCIAL

## 2021-06-01 ENCOUNTER — PRE VISIT (OUTPATIENT)
Dept: ORTHOPEDICS | Facility: CLINIC | Age: 41
End: 2021-06-01

## 2021-06-01 ENCOUNTER — TRANSFERRED RECORDS (OUTPATIENT)
Dept: HEALTH INFORMATION MANAGEMENT | Facility: CLINIC | Age: 41
End: 2021-06-01

## 2021-06-01 ENCOUNTER — OFFICE VISIT (OUTPATIENT)
Dept: ORTHOPEDICS | Facility: CLINIC | Age: 41
End: 2021-06-01
Payer: COMMERCIAL

## 2021-06-01 ENCOUNTER — TELEPHONE (OUTPATIENT)
Dept: ORTHOPEDICS | Facility: CLINIC | Age: 41
End: 2021-06-01

## 2021-06-01 VITALS — WEIGHT: 245.1 LBS | BODY MASS INDEX: 41.85 KG/M2 | HEIGHT: 64 IN

## 2021-06-01 DIAGNOSIS — G89.29 CHRONIC MIDLINE THORACIC BACK PAIN: ICD-10-CM

## 2021-06-01 DIAGNOSIS — M54.50 CHRONIC BILATERAL LOW BACK PAIN WITHOUT SCIATICA: ICD-10-CM

## 2021-06-01 DIAGNOSIS — M54.6 CHRONIC MIDLINE THORACIC BACK PAIN: ICD-10-CM

## 2021-06-01 DIAGNOSIS — G89.29 CHRONIC BILATERAL LOW BACK PAIN WITHOUT SCIATICA: ICD-10-CM

## 2021-06-01 DIAGNOSIS — G89.29 CHRONIC MIDLINE THORACIC BACK PAIN: Primary | ICD-10-CM

## 2021-06-01 DIAGNOSIS — M54.6 CHRONIC MIDLINE THORACIC BACK PAIN: Primary | ICD-10-CM

## 2021-06-01 PROCEDURE — 72100 X-RAY EXAM L-S SPINE 2/3 VWS: CPT | Performed by: RADIOLOGY

## 2021-06-01 PROCEDURE — 99203 OFFICE O/P NEW LOW 30 MIN: CPT | Performed by: FAMILY MEDICINE

## 2021-06-01 PROCEDURE — 72070 X-RAY EXAM THORAC SPINE 2VWS: CPT | Performed by: STUDENT IN AN ORGANIZED HEALTH CARE EDUCATION/TRAINING PROGRAM

## 2021-06-01 RX ORDER — LACTASE 3000 UNIT
TABLET ORAL
COMMUNITY
Start: 2021-05-18

## 2021-06-01 RX ORDER — NYSTATIN AND TRIAMCINOLONE ACETONIDE 100000; 1 [USP'U]/G; MG/G
OINTMENT TOPICAL
COMMUNITY
Start: 2020-08-31

## 2021-06-01 RX ORDER — SIMETHICONE 125 MG
125 TABLET,CHEWABLE ORAL 2 TIMES DAILY
COMMUNITY

## 2021-06-01 RX ORDER — CYCLOBENZAPRINE HCL 10 MG
10 TABLET ORAL 3 TIMES DAILY
Qty: 90 TABLET | Refills: 1 | Status: SHIPPED | OUTPATIENT
Start: 2021-06-01 | End: 2021-07-12

## 2021-06-01 RX ORDER — AZELASTINE HYDROCHLORIDE 0.5 MG/ML
SOLUTION/ DROPS OPHTHALMIC
COMMUNITY
Start: 2021-02-01

## 2021-06-01 RX ORDER — MAGNESIUM CARB/ALUMINUM HYDROX 105-160MG
296 TABLET,CHEWABLE ORAL ONCE
COMMUNITY

## 2021-06-01 RX ORDER — PAROXETINE 20 MG/1
TABLET, FILM COATED ORAL
COMMUNITY
Start: 2021-04-20

## 2021-06-01 RX ORDER — LEVOTHYROXINE SODIUM 88 UG/1
TABLET ORAL
COMMUNITY
Start: 2021-05-14 | End: 2022-04-21

## 2021-06-01 RX ORDER — PSEUDOEPHED/ACETAMINOPH/DIPHEN 30MG-500MG
TABLET ORAL
COMMUNITY
Start: 2021-05-25

## 2021-06-01 RX ORDER — TRIAMCINOLONE ACETONIDE 1 MG/G
CREAM TOPICAL
COMMUNITY
Start: 2020-10-21

## 2021-06-01 RX ORDER — MICONAZOLE NITRATE 2 G/100G
CREAM VAGINAL
COMMUNITY
Start: 2020-07-28

## 2021-06-01 RX ORDER — VALACYCLOVIR HYDROCHLORIDE 500 MG/1
TABLET, FILM COATED ORAL
COMMUNITY
Start: 2021-05-18

## 2021-06-01 RX ORDER — KETOCONAZOLE 20 MG/G
CREAM TOPICAL
COMMUNITY
Start: 2020-01-23

## 2021-06-01 RX ORDER — CYCLOBENZAPRINE HCL 10 MG
10 TABLET ORAL 3 TIMES DAILY PRN
Qty: 90 TABLET | Refills: 1 | Status: SHIPPED | OUTPATIENT
Start: 2021-06-01 | End: 2021-06-01

## 2021-06-01 RX ORDER — CLINDAMYCIN PHOSPHATE 10 UG/ML
LOTION TOPICAL
COMMUNITY

## 2021-06-01 RX ORDER — CALCIUM POLYCARBOPHIL 625 MG
TABLET ORAL
COMMUNITY
Start: 2021-01-15

## 2021-06-01 RX ORDER — FLUTICASONE PROPIONATE 110 UG/1
110 AEROSOL, METERED RESPIRATORY (INHALATION)
COMMUNITY

## 2021-06-01 ASSESSMENT — MIFFLIN-ST. JEOR: SCORE: 1755.15

## 2021-06-01 NOTE — TELEPHONE ENCOUNTER
Can use three times daily instead of two times daily. Can be scheduled or prn. Thanks!       I spoke with pharmacist over at [facility]. There is still confusion on how the patient should be taking the medication. She is currently taking a scheduled a tab BID and Dr. Quintero changed it that patient can take it 1 tab TID as scheduled or PRN. They want more clarification if patient should continue the 1 tab BID and take 1 tab PRN or take all PRN. I will have Dr. Quintero call them to discuss.

## 2021-06-01 NOTE — TELEPHONE ENCOUNTER
[facility] Call Center    Phone Message    May a detailed message be left on voicemail: yes     Reason for Call: Medication Question or concern regarding medication   Prescription Clarification  Name of Medication: Cyclobenzaprine 10MG  Prescribing Provider: Jarad Quintero   Pharmacy: [facility]   What on the order needs clarification? Previous instructions were 1 tab 2x daily. New RX written as PRN. Which to use?          Action Taken: Message routed to:  [facility] ([facility]): Sports Medicine    Travel Screening: Not Applicable

## 2021-06-01 NOTE — TELEPHONE ENCOUNTER
Spoke to pharmacist. Changed to TID scheduled instead of prn for cyclobenzaprine. New script sent.    Jarad Quintero MD  June 1, 2021  3:26 PM

## 2021-06-01 NOTE — LETTER
6/1/2021      RE: Jonathon Broussard  [address]  [address]  [facility] [address]       ASSESSMENT/PLAN:    (M54.6,  G89.29) Chronic midline thoracic back pain  (primary encounter diagnosis)  Comment: majority of pain seems postural in nature; will have her do some strengthening and postural training   Plan: XR Thoracic Spine 2 Views, PHYSICAL THERAPY REFERRAL (Internal)          (M54.5,  G89.29) Chronic bilateral low back pain without sciatica  Comment: see above   Plan: X-ray Lumbar Spine 2-3 views*, PHYSICAL THERAPY REFERRAL (Internal)          Jarad Quintero MD  June 1, 2021  2:22 PM        Pt is a 41 year old female here today for:     Back pain:   Location: Bilateral mid to low back -> pain is mostly on the sides   Duration: Months   Radiation: No  Numbness/ Tingling? No   Weakness? No  Flexion or Extension bias: Extension  Red flags? No  Meds tried? Nothing has been tried.  PT? No  Imaging? No    Can only take short walks w/o back feeling like its going to give out  When she does laundry, has to sit on chairs in hallway because she feels like the back is going to give out     Past Medical History:   Diagnosis Date     ADHD (attention deficit hyperactivity disorder)      Bipolar I disorder, most recent episode (or current) unspecified '98    initially dx as depression, then bipolar     Calculus of gallbladder without mention of cholecystitis or obstruction 5/2015     Cannabis dependence, in remission (H) 3/04    treatment x2, last use 6/06     Fibromyalgia      Gastro-oesophageal reflux disease      HTN (hypertension)      Juvenile idiopathic arthritis (H)      OCD (obsessive compulsive disorder)      Snores      Thyroid disease     hypothyroidism      Past Surgical History:   Procedure Laterality Date     HC TOOTH EXTRACTION W/FORCEP  1995     LAPAROSCOPIC CHOLECYSTECTOMY N/A 6/5/2015    Procedure: LAPAROSCOPIC CHOLECYSTECTOMY;  Surgeon: Jacob Hobson MD;  Location[facility]      Current Outpatient  Medications   Medication Sig Dispense Refill     ACETAMINOPHEN EXTRA STRENGTH 500 MG tablet TAKE 1 TAB BY MOUTH THREE TIMES DAILY       amLODIPine (NORVASC) 10 MG tablet TAKE 1 TABLET BY MOUTH EVERY DAY DX HIGH BLOOD PRESSURE  11     azelastine (OPTIVAR) 0.05 % ophthalmic solution INSTILL 1 DROP INTO BOTH EYES TWICE DAILY       clindamycin (CLEOCIN T) 1 % external lotion        cloZAPine (CLOZARIL) 100 MG tablet 400mg QHS. (4 tabs QHS)       cyclobenzaprine (FLEXERIL) 10 MG tablet Take 10 mg by mouth 2 times daily       diazepam (VALIUM) 10 MG tablet TAKE 1 TAB BY MOUTH THREE TIMES DAILY.IF TOO SEDATING ,CAN TAKE 1 2 TAB (5MG)  4     LANsoprazole (PREVACID) 30 MG DR capsule TAKE 1 CAP BY MOUTH ONCE DAILY 30 MINS PRIOR TO FIRST MEAL OF THE DAY 90 capsule 3     lisinopril (PRINIVIL/ZESTRIL) 5 MG tablet TAKE 1 TAB BY MOUTH ONCE DAILY  11     lurasidone (LATUDA) 40 MG TABS tablet Take by mouth daily with food       melatonin 5 MG tablet Take 5 mg by mouth       Paliperidone (INVEGA PO) Take 9 mg by mouth       topiramate (TOPAMAX) 25 MG tablet Take 3 tablets (75 mg) by mouth 2 times daily 180 tablet 2     triamcinolone (KENALOG) 0.1 % external cream APPLY TOPICALLY TO BOTH HANDS TWICE DAILY AS NEEDED FOR 2 WEEKS AT A TIME       valACYclovir (VALTREX) 500 MG tablet TAKE 1 TAB BY MOUTH DAILY       VITAMIN D, CHOLECALCIFEROL, PO Take 2,000 Units by mouth daily        atomoxetine (STRATTERA) 100 MG capsule One cap QAM (with one 25mg cap for total of 125mg QAM). Take after food.       atomoxetine (STRATTERA) 25 MG capsule One cap QAM (with one 100mg cap for total of 125mg QAM). Take after food.       benztropine (COGENTIN) 0.5 MG tablet Take 0.5 mg by mouth       carvedilol (COREG) 12.5 MG tablet TAKE 3 TABLETS (37.5mg) BY MOUTH TWICE DAILY DX HIGH BLOOD PRESSURE  11     cholecalciferol (VITAMIN D-1000 MAX ST) 1000 units TABS Take 1 tablet by mouth every 24 hours       diphenhydrAMINE HCl 50 MG TABS One tab QID prn.        "GABAPENTIN PO Take 1,200 mg by mouth 2 times daily Two tablets twice daily       hydrocortisone (CORTAID) 1 % external cream        LEVOTHYROXINE SODIUM PO Take 112 mcg by mouth       lithium (ESKALITH) 600 MG capsule Take 1,200 mg by mouth daily        LORazepam (ATIVAN) 2 MG tablet TAKE 1 TAB BY MOUTH THREE TIMES DAILY  4     Lubricants (SURGICAL LUBRICANT) external gel        LUBRICATING PLUS EYE DROPS 0.5 % SOLN ophthalmic solution INSTILL 1 DROP INTO EACH EYE FOUR TIMES DAILY  11     metFORMIN (GLUCOPHAGE-XR) 500 MG 24 hr tablet Take 2 tablets (1,000 mg) by mouth 2 times daily (with meals) 120 tablet 3     Multiple Vitamin (DAILY-KONSTANTIN) TABS Take 1 tablet by mouth At Bedtime       Multiple Vitamins-Minerals (CERTA-KONSTANTIN PO)        MYRBETRIQ 25 MG 24 hr tablet TAKE 1 TAB BY MOUTH ONCE DAILY       nystatin-triamcinolone (MYCOLOG) 641395-5.1 UNIT/GM-% external ointment APPLY TO AFFECTED AREA(S) ON LABIA TWICE DAILY FOR ONE WEEK FOR CANDIDA       olopatadine (PATANOL) 0.1 % ophthalmic solution INSTILL 1 DROP TO BOTH EYES TWICE A DAY  5     oxybutynin (DITROPAN-XL) 10 MG 24 hr tablet TAKE 1 TAB BY MOUTH ONCE DAILY  99     paliperidone (INVEGA SUSTENNA) 234 MG/1.5ML SUSP Inject 234 mg into the muscle       polyethylene glycol (MIRALAX) powder Take 17 g (1 capful) by mouth daily 510 g 11     SM NICOTINE POLACRILEX 2 MG gum CHEW 1 PIECE EVERY HOUR AS NEEDED FOR NICOTINE CRAVINGS  16     triamcinolone (KENALOG) 0.025 % cream APPLY TWICE DAILY TO RASH ON WRIST AND UNDER ARMPITS  1     VENLAFAXINE HCL PO Take 375 mg by mouth 2 times daily ER        Allergies   Allergen Reactions     Haldol [Haloperidol] Tinnitus     Zyprexa [Olanzapine] Visual Disturbance and Other (See Comments)     akathesia     Seasonal Allergies       ROS:   Gen- no fevers/chills   Rheum - no morning stiffness   Derm - no rash/ redness   Neuro - no numbness, no tingling   Remainder of ROS negative.     Exam:   Ht 1.615 m (5' 3.58\")   Wt 111.2 kg (245 " lb 1.6 oz)   BMI 42.62 kg/m         BACK:   ROM: flexion -full /extension -full /lateral rotation- full /side bend- full   Bony tenderness: midline thoracic to lumbar  Paraspinal tenderness: bilateral   Sensation: intact in b/l lower extremities.   Strength: 5/5 w/ dorsiflexion/ plantarflexion/ inversion/ eversion/ knee flexion/ extension.   Maneuvers: Neg straight leg raise b/l. Neg Slump b/l Neg JIMMY   Neuro: DTR's 2+ Neg Clonus       [place] [facility] [place]-sp[place] - looks osteopenic; +degenerative changes  l-spine - negative        Jarad Quintero MD

## 2021-06-24 ENCOUNTER — THERAPY VISIT (OUTPATIENT)
Dept: PHYSICAL THERAPY | Facility: CLINIC | Age: 41
End: 2021-06-24
Attending: FAMILY MEDICINE
Payer: COMMERCIAL

## 2021-06-24 DIAGNOSIS — M54.6 CHRONIC MIDLINE THORACIC BACK PAIN: ICD-10-CM

## 2021-06-24 DIAGNOSIS — G89.29 CHRONIC BILATERAL LOW BACK PAIN WITHOUT SCIATICA: ICD-10-CM

## 2021-06-24 DIAGNOSIS — M54.50 CHRONIC BILATERAL LOW BACK PAIN WITHOUT SCIATICA: ICD-10-CM

## 2021-06-24 DIAGNOSIS — G89.29 CHRONIC MIDLINE THORACIC BACK PAIN: ICD-10-CM

## 2021-06-24 PROCEDURE — 97161 PT EVAL LOW COMPLEX 20 MIN: CPT | Mod: GP | Performed by: PHYSICAL THERAPIST

## 2021-06-24 PROCEDURE — 97110 THERAPEUTIC EXERCISES: CPT | Mod: GP | Performed by: PHYSICAL THERAPIST

## 2021-06-25 NOTE — PROGRESS NOTES
Physical Therapy Initial Evaluation  Subjective:  [facility]  Physical Therapy Initial Examination/Evaluation  June 24, 2021    Jonathon Broussard is a 41 year old  female referred to physical therapy by Dr. Quintero for treatment of low back pain with Precautions/Restrictions/MD instructions none    KEY PT FINDINGS:  1.  Morbid obesity  2.  Poor core stability  3.  Poor posture    Subjective:  Referring MD visit date: 6/1/21  DOI/onset: 6 months ago  Mechanism of injury: insidious onset of pain.  Attributes this to bad posture.  DOS none  Previous treatment: lumbar roll  Imaging: xray  Chief Complaint:   Pain with walking. Pain with doing laundry   Pain: best 4 /10, worst 10/10 bilateral lumbar Described as: aching Alleviated by: rest Frequency: intermittent Progression of symptoms since initial onset: staying the same Time of day when pain is worse: not related  Sleeping: not affected      Occupation: on disablity  Job duties:  none    Current HEP/exercise regimen: none  Patient's goals are reduce pain    Pertinent PMH: see epic   General Health Reported by Patient: good  Return to MD:  PRN     Physical Examination    Gait  Slow, toed out    Posture  Poor sitting and standing posture    Lumbar ROM  Painful in all directions, 25% loss of mobility in all directions    Palpation  Grossly tender bilateral lumbar paraspinals    Lumbar myotomes  5/5 L1-S1    Strength  Poor core contraction, abdominal bulging noted    Assessment/Plan      Patient is a 41 year old female with lumbar complaints.    Patient has the following significant findings with corresponding treatment plan.                Diagnosis 1:  Back Pain    Pain -  hot/cold therapy, US, electric stimulation, manual therapy, splint/taping/bracing/orthotics, self management, education, and home program  Decreased ROM/flexibility - manual therapy and therapeutic exercise  Decreased joint mobility - manual therapy and  therapeutic exercise  Decreased strength - therapeutic exercise and therapeutic activities  Impaired balance - neuro re-education and therapeutic activities  Decreased proprioception - neuro re-education and therapeutic activities  Inflammation - cold therapy  Edema - vasopneumatics  Impaired gait - gait training  Impaired muscle performance - neuro re-education  Decreased function - therapeutic activities    Therapy Evaluation Codes:   1) History comprised of:   Personal factors that impact the plan of care:      None.    Comorbidity factors that impact the plan of care are:      None.     Medications impacting care: None.  2) Examination of Body Systems comprised of:   Body structures and functions that impact the plan of care:      Lumbar spine.   Activity limitations that impact the plan of care are:      Sitting, Squatting/kneeling, Stairs, Standing, Walking and Sleeping.  3) Clinical presentation characteristics are:   Stable/Uncomplicated.  4) Decision-Making    Low complexity using standardized patient assessment instrument and/or measureable assessment of functional outcome.  Cumulative Therapy Evaluation is: Low complexity.    Previous and current functional limitations:  (See Goal Flow Sheet for this information)    Short term and Long term goals: (See Goal Flow Sheet for this information)     Communication ability:  Patient appears to be able to clearly communicate and understand verbal and written communication and follow directions correctly.  Treatment Explanation - The following has been discussed with the patient:   RX ordered/plan of care  Anticipated outcomes  Possible risks and side effects  This patient would benefit from PT intervention to resume normal activities.   Rehab potential is good.    Frequency:  1 X week, once daily  Duration:  for 6 weeks  Discharge Plan:  Achieve all LTG.  Independent in home treatment program.  Reach maximal therapeutic benefit.    Please refer to the daily  flowsheet for treatment today, total treatment time and time spent performing 1:1 timed codes.

## 2021-07-01 ENCOUNTER — THERAPY VISIT (OUTPATIENT)
Dept: PHYSICAL THERAPY | Facility: CLINIC | Age: 41
End: 2021-07-01
Payer: COMMERCIAL

## 2021-07-01 DIAGNOSIS — M54.6 CHRONIC MIDLINE THORACIC BACK PAIN: Primary | ICD-10-CM

## 2021-07-01 DIAGNOSIS — G89.29 CHRONIC BILATERAL LOW BACK PAIN WITHOUT SCIATICA: ICD-10-CM

## 2021-07-01 DIAGNOSIS — M54.50 CHRONIC BILATERAL LOW BACK PAIN WITHOUT SCIATICA: ICD-10-CM

## 2021-07-01 DIAGNOSIS — G89.29 CHRONIC MIDLINE THORACIC BACK PAIN: Primary | ICD-10-CM

## 2021-07-01 PROCEDURE — 97110 THERAPEUTIC EXERCISES: CPT | Mod: GP | Performed by: PHYSICAL THERAPIST

## 2021-07-01 PROCEDURE — 97530 THERAPEUTIC ACTIVITIES: CPT | Mod: GP | Performed by: PHYSICAL THERAPIST

## 2021-07-06 ENCOUNTER — VIRTUAL VISIT (OUTPATIENT)
Dept: ENDOCRINOLOGY | Facility: CLINIC | Age: 41
End: 2021-07-06
Payer: COMMERCIAL

## 2021-07-06 VITALS — WEIGHT: 253 LBS | BODY MASS INDEX: 44.83 KG/M2 | HEIGHT: 63 IN

## 2021-07-06 DIAGNOSIS — R63.5 WEIGHT GAIN DUE TO MEDICATION: ICD-10-CM

## 2021-07-06 DIAGNOSIS — E66.01 MORBID OBESITY (H): Primary | ICD-10-CM

## 2021-07-06 DIAGNOSIS — T50.905A WEIGHT GAIN DUE TO MEDICATION: ICD-10-CM

## 2021-07-06 PROCEDURE — 99443 PR PHYSICIAN TELEPHONE EVALUATION 21-30 MIN: CPT | Performed by: NURSE PRACTITIONER

## 2021-07-06 ASSESSMENT — MIFFLIN-ST. JEOR: SCORE: 1781.73

## 2021-07-06 ASSESSMENT — PAIN SCALES - GENERAL: PAINLEVEL: NO PAIN (0)

## 2021-07-06 NOTE — NURSING NOTE
"Chief Complaint   Patient presents with     Follow Up     Follow-up Weight Management       Vitals:    07/06/21 1543   Weight: 114.8 kg (253 lb)   Height: 1.6 m (5' 3\")       Body mass index is 44.82 kg/m .                          "

## 2021-07-06 NOTE — PATIENT INSTRUCTIONS
"It was a pleasure meeting with you today.    Thank you for allowing us the privilege of caring for you. We hope we provided you with the excellent service you deserve.   Please let us know if there is anything else we can do for you so that we can be sure you are leaving completely satisfied with your care experience.    To ensure the quality of our services you may be receiving a patient satisfaction survey from an independent patient satisfaction monitoring company.    The greatest compliment you can give is a \"Likely to Recommend\"      Your visit was with Jessica Contreras NP today.     Instructions per today's visit:     Continue Metformin   Continue topiramate  You need to discuss Topiramate increase with psychiatry. Please have them send me a message okay to increase if agreeable. Concern is that mood was worse last time you increased. We could also discuss taper off if you wish.     Goals:  8oz glass of water before any snack   Walk around building before any snack       To schedule appointments with our team, please call [phone] option #1    Please call during clinic hours Monday through Friday 8:00a - 4:00p if you have questions or you can contact us via MightyNest at anytime.      Nurses: [phone]  Fax: [phone]  Surgery Scheduler: [phone]    Please call the hospital at [phone] to speak with our on call MDs if you have urgent needs after hours, during weekends, or holidays.    **Please note if you need to go to the Emergency Room for any reason in the first 90 days after surgery it is important to go to the [facility] ER on the [address] on [facility] off of the [address] off [address]    *For patients who are currently enrolled in our program and have not yet had weight loss surgery please note*:    You must be at your required goal weight at the time of your Anesthesia clinic visit as well as the day of surgery or your surgery will be canceled. You will not be able to obtain a " new surgery date until you have met your goal weight.    Lab results will be communicated through My Chart or letter (if My Chart not used). Please call the clinic if you have not received communication after 1 week or if you have any questions.?    Main follow-up parameters for all bariatric patients     Patients who have undergone Bariatric surgery:    1. Follow-up interval during the first year: ~1 week, 1 month, 3 month, 6 month,12 months.  Every 6 months until 5 years; and then annually thereafter.  The goal of follow-up sessions is to ensure that food intake behavior (choice of food and eating speed) and exercise/activity level are set appropriately.    2. Yearly lab tests ordered by our clinic.      3. The bariatric team should be aware and potentially evaluate all adverse gastrointestinal symptoms (dysphagia, abdominal pain, nausea, vomiting, diarrhea, heartburn, reflux, etc), which can be a sign of complication.  The bariatric surgeons perform general surgery procedures in addition to bariatric surgery (laparoscopic cholecystectomy, etc.)    4. Inability to tolerate textured food (chicken, steak, fish, eggs, beans) is NOT normal and may need to be evaluated by endoscopy performed by the bariatric surgeon.    _____________________________________________________________________________________________________________________________    Meal Replacement Products:    Here is the link to our new e-store where you can purchase our meal replacement products    [facility] E-Store  [email or web address]    The one week starter kit is a great way to sample a variety of products and see what works for you.    If you want more information about the product go to: [employer]    Free Shipping for orders over $75     Benefits of meal replacements products:    Portion and calorie control  Improved nutrition  Structured eating  Simplified food choices  Avoid contact with trigger  foods  ______________________________________________________________________________________________________________________________    Healthy Lifestyle Support Group   [facility] Weight Management Program  Virtual Support Group Now Available    60 minutes of small group guided discussion, support and resources    Led by Health , Ester Byrd    For questions, and to receive the invite information, contact Ester at [email or web address]    All our welcome!    One Friday per month, 12:30pm to 1:30pm  SELF COMPASSION: July 31st  CREATING MY WELLNESS VISION: August 28th  MINDFULNESS PRACTICES FOR A CALM BODY & MIND: September 25th  MINDFUL EATING TOOLS: October 30th  HEALTHY& HAPPY HOLIDAYS: November 20th  OPEN FORUM: December 18th  _______________________________________________________________________________________________________________________________    Connections: Bariatric Care support group  Due to the Covid-19 restrictions, we will be doing our support group virtually using Microsoft Teams. You will need to get an invitation to the group from Jeremy Muñoz, Ph.D., LP at [email or web address] and to learn about using Microsoft Teams. The next meeting is on Tuesday, July 14 between 6:30 and 8 PM and there will be no formal speaker.    This group meets the second Tuesday of each month from 6:30 to 8 PM and will be held again at [facility] in the  [facility] (A-B room) when the Covid-19 restrictions are lifted. The group is led by Jeremy Muñoz, Ph.D., Licensed Psychologist, [facility] Weight Management Program. There is no cost for group participation and is open to all [facility] (and those external to this program) pre- and post-operative bariatric surgery patients as well as their support system.    _________________________________________________________________________________________________________________________________      Interested in working with a health ?  Health coaches work with you to improve your overall health and wellbeing.  They look at the whole person, and may involve discussion of different areas of life, including, but not limited to the four pillars of health (sleep, exercise, nutrition, and stress management). Discuss with your care team if you would like to start working a health .     Health Coaching-3 Pack:      $99 for three health coaching visits    Visits may be done in person or via phone    Coaching is a partnership between the  and the client; Coaches do not prescribe or diagnose    Coaching helps inspire the client to reach his/her personal goals   __________________________________________________________________________________________________________________________________    [facility] Get Moving Program    Our team of physical therapists is trained to help you understand and take control of your condition. They will perform a thorough evaluation to determine your ability for activity and develop a customized plan to fit your goals and physical ability.  Scheduling: Unsure if the Get Moving program is right for you? Discuss the program with your medical provider or diabetes educator. You can also call us at [phone] to ask questions or schedule an appointment.   [facility] Get Moving Program  ______________________________________________________________________________________________________________________  Bluetooth Scale:    We hope to provide you with high quality telephone and virtual healthcare visits while social distancing for COVID-19 is necessary, as well as in the future when virtual visits may be more convenient for you.     Our technology team made it possible for [employer] scales to send weight measurements to our  electronic medical record. This allows weights from you weighing at home to securely flow into the medical record, which will improve telephone and virtual visits.   Additionally, studies have shown that adults actually lose more weight when their weights are automatically sent to someone else, and also that this process is not stressful for those adults.    Below is a link for purchasing the scale, with a discount code for our patients. You may call your insurance company to see if they will reimburse you for the cost of the scale, as a piece of durable medical equipment. The scales only go up to a weight of 400 pounds. This is an issue and we are working with the developer on increasing this. We found no scales that go over 400lb that have blue-tooth for connecting to [employer].    Scale to purchase: the [employer]  Body  Scale: [email or web address]=aw.ds    Discount Code: We have a discount code for our patients to bring the cost down to $50, the code is: [place]_Scale_20%off    Steps to link the scale to [employer] via an Android Phone (you can always disconnect at any point in the future):  1. The order must be placed first before the patient can access Track My Health within [employer].  2. Download Google Fit lamin from the Google Play Store   a. Log in or register using your Google account   3. Download the [employer] lamin from Google Play Store  a. Select add organization   b. Search for [employer] and select it   c. Log into [employer]  d. Select Track My Health   e. Select the green connect my account button   f. When prompted log into your Google account   g. Select okay to confirm the account   4. Download the [employer] Health Mate lamin from Google Play Store  a. [place] for [employer]   b. Go to profile   c. Tap google fit under the Apps section  d. Select the option to activate Google Fit integration   e. Select the same Google  account   f. Select okay to confirm the account  g.   Steps to link the scale to [employer] via an iPhone (you can always disconnect at any point in the future):  **Note [employer] is not available for download on an iPad**  1. The order must be placed first before the patient can access Track My Health within [employer].  2. Locate the Health shabnam on your iPhone.  a. Set up your Apple Health account as prompted  b. The Sources page will show Apps that communicate with your Health shabnam. Once all steps are completed, you should see JumpIn and Hang[employer]et listed under the Apps section and your iPhone under the devices section.  i. Select Health Mate  1. Under 'ALLOW  [email or web address]  TO WRITE DATA ensure the toggle is on for Weight.  2. This will allow the scale to add your weight to the Apple Health  ii. Select MyChart  1. Under 'ALLOW  [employer]  TO READ DATA ensure the toggle is on for Weight.  2. This allows [employer] to grab the weight from [employer] so your provider can see your weights.  3. Download the RunMyProcess shabnam from the Shabnam Store   a. Select add organization                                                  b. Search for [employer] and select it  c. Log into [employer]  d. Select Track My Health   e. Select the green connect my account button   f. Follow prompts to link your device to [employer].  4. Download the Withings health mate shabnam in the Shabnam Store   a. [place] for [employer]   b. Go to profile   c. [employer] under the Apps section  d. If prompted to allow access with the Health Shabnam, toggle weight on for read and write access.

## 2021-07-06 NOTE — LETTER
2021       RE: Jonathon Broussard  [address]  [address]  [place] [address]     Dear Colleague,    Thank you for referring your patient, Jonathon Broussard, to the [facility]NA[facility]. Please see a copy of my visit note below.    Total time on phone visit 15min     Return Medical Weight Management Note     Jonathon Broussard  MRN:  [id number]  :  1980  PO:  2021    Dear No primary care provider on file.,    I had the pleasure of seeing your patient Jonathon Broussard. She is a 41 year old female who I am continuing to see for treatment of obesity related to:       2020   I have the following health issues associated with obesity: High Blood Pressure, Sleep Apnea, GERD (Reflux), Asthma   I have the following symptoms associated with obesity: None of the above       Assessment & Plan   Problem List Items Addressed This Visit        Digestive    Morbid obesity (H) - Primary     Patient struggling with depression as of recent. Psychiatry working on med adjustments. Patient using food as coping mechanism, which she finds distressing. Struggling to perform activities of daily living at this point. Discussed weight maintenance rather than weight loss when in this place. Patient frustrated by this. Continues metformin 2000mg, no adverse side effects. Feels topiramate not helpful, would like increase. Historically, she had SI shortly after increase in topiramate - she feels it was not related to increase in topiramate. Has not discussed increase in topiramate with psychiatry as I requested at last visit. I am not comfortable increasing topiramate at this time without psychiatry approval. She requests to stop the topiramate instead- after discussing taper off of topiramate, she would like to stay on it for now.     Attempted to discuss behavior modifications to avoid stress/ emotional eating like drinking a  "glass of water prior to any snacking or taking a walk around the facility prior to getting a snack- making snacking more difficult to get to. She is not open to much discussion today.                 Review of external notes as documented above     23 minutes spent on the date of the encounter doing chart review, history and exam, documentation and further activities per the note  [id number]}  MEDICATIONS:  Continue current medications without change  FUTURE APPOINTMENTS:       - Follow-up visit in 3 months         INTERVAL HISTORY:  Last seen 5/2021- increased metformin to 2000mg for antipsychotic weight gain. Patient wanting to go back up on topiramate- SI after last time the medication was increased. Patient feels it wasn't related to her topiramate dosing. She was going to discuss with psychiatry first.     Since last seen- struggling with significant depression- psychiatry made some med adjustments 7/1/2021. Has been having difficulty avoiding snacking and stress eating. Food makes her feel better. Lots of stress. Hasn't found any good alternative to eating. Doesn't currently work with a therapist but does see a counselor weekly where she lives.     Doesn't feel topiramate is helpful, feels she should maybe stop this. Discussed taper and she decided to stay on it. Has not discussed with psychiatry about increase.     Doing 2 slimfast daily and 1 meal on own - has started and stopped this on and off over the months but wants to restart this.      CURRENT WEIGHT:   253 lbs 0 oz                  Changes and Difficulties 7/6/2021   I have made the following changes to my diet since my last visit: no   With regards to my diet, I am still struggling with: with weight gain   I have made the following changes to my activity/exercise since my last visit: no   With regards to my activity/exercise, I am still struggling with: depression not feeling it       VITALS:  Ht 1.6 m (5' 3\")   Wt 114.8 kg (253 lb)   BMI 44.82 kg/m  "     MEDICATIONS:   Current Outpatient Medications   Medication Sig Dispense Refill     ACETAMINOPHEN EXTRA STRENGTH 500 MG tablet TAKE 1 TAB BY MOUTH THREE TIMES DAILY       amLODIPine (NORVASC) 10 MG tablet TAKE 1 TABLET BY MOUTH EVERY DAY DX HIGH BLOOD PRESSURE  11     atomoxetine (STRATTERA) 100 MG capsule One cap QAM (with one 25mg cap for total of 125mg QAM). Take after food.       atomoxetine (STRATTERA) 25 MG capsule One cap QAM (with one 100mg cap for total of 125mg QAM). Take after food.       azelastine (OPTIVAR) 0.05 % ophthalmic solution INSTILL 1 DROP INTO BOTH EYES TWICE DAILY       benzoyl peroxide 5 % external liquid        benztropine (COGENTIN) 0.5 MG tablet Take 0.5 mg by mouth       Calcium Polycarbophil (FIBER) 625 MG tablet TAKE 2 TABS (1,250mg) BY MOUTH TWICE DAILY       carvedilol (COREG) 12.5 MG tablet TAKE 3 TABLETS (37.5mg) BY MOUTH TWICE DAILY DX HIGH BLOOD PRESSURE  11     cholecalciferol (VITAMIN D-1000 MAX ST) 1000 units TABS Take 1 tablet by mouth every 24 hours       clindamycin (CLEOCIN T) 1 % external lotion        cloZAPine (CLOZARIL) 100 MG tablet 400mg QHS. (4 tabs QHS)       cyclobenzaprine (FLEXERIL) 10 MG tablet Take 1 tablet (10 mg) by mouth 3 times daily 90 tablet 1     cyclobenzaprine (FLEXERIL) 10 MG tablet Take 10 mg by mouth 2 times daily       diazepam (VALIUM) 10 MG tablet TAKE 1 TAB BY MOUTH THREE TIMES DAILY.IF TOO SEDATING ,CAN TAKE 1 2 TAB (5MG)  4     diphenhydrAMINE HCl 50 MG TABS One tab QID prn.       fluticasone (FLOVENT HFA) 110 MCG/ACT inhaler Inhale 110 mcg into the lungs       GABAPENTIN PO Take 1,200 mg by mouth 2 times daily Two tablets twice daily       hydrocortisone (CORTAID) 1 % external cream        ketoconazole (NIZORAL) 2 % external cream        LACTASE ENZYME 3000 units tablet TAKE 1 TAB BY MOUTH THREE TIMES DAILY AS NEEDED WITH FIRST BITE OF DAIRY CONTAINING FOOD       LANsoprazole (PREVACID) 30 MG DR capsule TAKE 1 CAP BY MOUTH ONCE DAILY 30  MINS PRIOR TO FIRST MEAL OF THE DAY 90 capsule 3     levothyroxine (SYNTHROID/LEVOTHROID) 88 MCG tablet TAKE 1 TAB BY MOUTH ONCE DAILY ON AN EMPTY STOMACH AND 30 MINUTES PRIOR TO OTHER MEDICATIONS       LEVOTHYROXINE SODIUM PO Take 112 mcg by mouth       lisinopril (PRINIVIL/ZESTRIL) 5 MG tablet TAKE 1 TAB BY MOUTH ONCE DAILY  11     lithium (ESKALITH) 600 MG capsule Take 1,200 mg by mouth daily        LORazepam (ATIVAN) 2 MG tablet TAKE 1 TAB BY MOUTH THREE TIMES DAILY  4     Lubricants (SURGICAL LUBRICANT) external gel        LUBRICATING PLUS EYE DROPS 0.5 % SOLN ophthalmic solution INSTILL 1 DROP INTO EACH EYE FOUR TIMES DAILY  11     lurasidone (LATUDA) 40 MG TABS tablet Take by mouth daily with food       magnesium citrate 1.745 GM/30ML solution Take 296 mLs by mouth once       melatonin 5 MG tablet Take 5 mg by mouth       metFORMIN (GLUCOPHAGE-XR) 500 MG 24 hr tablet Take 2 tablets (1,000 mg) by mouth 2 times daily (with meals) 120 tablet 3     Multiple Vitamin (DAILY-KONSTANTIN) TABS Take 1 tablet by mouth At Bedtime       Multiple Vitamins-Minerals (CERTA-KONSTANTIN PO)        MYRBETRIQ 25 MG 24 hr tablet TAKE 1 TAB BY MOUTH ONCE DAILY       nystatin-triamcinolone (MYCOLOG) 793755-0.1 UNIT/GM-% external ointment APPLY TO AFFECTED AREA(S) ON LABIA TWICE DAILY FOR ONE WEEK FOR CANDIDA       olopatadine (PATANOL) 0.1 % ophthalmic solution INSTILL 1 DROP TO BOTH EYES TWICE A DAY  5     oxybutynin (DITROPAN-XL) 10 MG 24 hr tablet TAKE 1 TAB BY MOUTH ONCE DAILY  99     Paliperidone (INVEGA PO) Take 9 mg by mouth       paliperidone (INVEGA SUSTENNA) 234 MG/1.5ML SUSP Inject 234 mg into the muscle       PARoxetine (PAXIL) 20 MG tablet TAKE 1/2 TAB (10MG) BY MOUTH DAILY FOR 1 WEEK TAKE WITH FOOD;TAKE 1 TAB BY MOUTH DAILY TAKE WITH FOOD       polyethylene glycol (MIRALAX) powder Take 17 g (1 capful) by mouth daily 510 g 11     simethicone (MYLICON) 125 MG chewable tablet Take 125 mg by mouth 2 times daily       SM MICONAZOLE 7 2  "% cream INSERT 1 APPLICATORFUL VAGINALLY AT BEDTIME FOR 7 DAYS       SM NICOTINE POLACRILEX 2 MG gum CHEW 1 PIECE EVERY HOUR AS NEEDED FOR NICOTINE CRAVINGS  16     topiramate (TOPAMAX) 25 MG tablet Take 3 tablets (75 mg) by mouth 2 times daily 180 tablet 2     triamcinolone (KENALOG) 0.025 % cream APPLY TWICE DAILY TO RASH ON WRIST AND UNDER ARMPITS  1     triamcinolone (KENALOG) 0.1 % external cream APPLY TOPICALLY TO BOTH HANDS TWICE DAILY AS NEEDED FOR 2 WEEKS AT A TIME       valACYclovir (VALTREX) 500 MG tablet TAKE 1 TAB BY MOUTH DAILY       VENLAFAXINE HCL PO Take 375 mg by mouth 2 times daily ER       VITAMIN D, CHOLECALCIFEROL, PO Take 2,000 Units by mouth daily          Weight Loss Medication History Reviewed With Patient 7/6/2021   Which weight loss medications are you currently taking on a regular basis?  Topamax (topiramate)   Are you having any side effects from the weight loss medication that we have prescribed you? No   If you are having side effects please describe: none       Orders Only on 07/23/2018   Component Date Value Ref Range Status     Specimen Description 07/23/2018 Feces   Final     C Diff Toxin B PCR 07/23/2018 Positive* NEG^Negative Final    Comment: Positive: Toxin producing Clostridium difficile target DNA sequences detected,   presumed positive for Clostridium difficile toxin B.  Clostridium difficile (Requires Enteric Isolation)  FDA approved assay performed using [employer] GeneXpert real-time PCR.           PHYSICAL EXAM:  Objective    Ht 1.6 m (5' 3\")   Wt 114.8 kg (253 lb)   BMI 44.82 kg/m    Vitals - Patient Reported  Pain Score: No Pain (0)        Physical Exam   healthy, alert and no distress  PSYCH: Alert and oriented times 3; coherent speech, normal   rate and volume, able to articulate logical thoughts, able   to abstract reason, no tangential thoughts, no hallucinations   or delusions  Her affect is tearful  RESP: No cough, no audible wheezing, able to talk in full " sentences  Remainder of exam unable to be completed due to telephone visits      Sincerely,    Jessica Contreras NP

## 2021-07-06 NOTE — ASSESSMENT & PLAN NOTE
Patient struggling with depression as of recent. Psychiatry working on med adjustments. Patient using food as coping mechanism, which she finds distressing. Struggling to perform activities of daily living at this point. Discussed weight maintenance rather than weight loss when in this place. Patient frustrated by this. Continues metformin 2000mg, no adverse side effects. Feels topiramate not helpful, would like increase. Historically, she had SI shortly after increase in topiramate - she feels it was not related to increase in topiramate. Has not discussed increase in topiramate with psychiatry as I requested at last visit. I am not comfortable increasing topiramate at this time without psychiatry approval. She requests to stop the topiramate instead- after discussing taper off of topiramate, she would like to stay on it for now.     Attempted to discuss behavior modifications to avoid stress/ emotional eating like drinking a glass of water prior to any snacking or taking a walk around the facility prior to getting a snack- making snacking more difficult to get to. She is not open to much discussion today.

## 2021-07-06 NOTE — PROGRESS NOTES
Total time on phone visit 15min     Return Medical Weight Management Note     Jonathon Broussard  MRN:  [id number]  :  1980  PO:  2021    Dear No primary care provider on file.,    I had the pleasure of seeing your patient Jonathon Broussard. She is a 41 year old female who I am continuing to see for treatment of obesity related to:       2020   I have the following health issues associated with obesity: High Blood Pressure, Sleep Apnea, GERD (Reflux), Asthma   I have the following symptoms associated with obesity: None of the above       Assessment & Plan   Problem List Items Addressed This Visit        Digestive    Morbid obesity (H) - Primary     Patient struggling with depression as of recent. Psychiatry working on med adjustments. Patient using food as coping mechanism, which she finds distressing. Struggling to perform activities of daily living at this point. Discussed weight maintenance rather than weight loss when in this place. Patient frustrated by this. Continues metformin 2000mg, no adverse side effects. Feels topiramate not helpful, would like increase. Historically, she had SI shortly after increase in topiramate - she feels it was not related to increase in topiramate. Has not discussed increase in topiramate with psychiatry as I requested at last visit. I am not comfortable increasing topiramate at this time without psychiatry approval. She requests to stop the topiramate instead- after discussing taper off of topiramate, she would like to stay on it for now.     Attempted to discuss behavior modifications to avoid stress/ emotional eating like drinking a glass of water prior to any snacking or taking a walk around the facility prior to getting a snack- making snacking more difficult to get to. She is not open to much discussion today.                 Review of external notes as documented above     23 minutes spent on the date of the encounter doing chart review, history and exam,  "documentation and further activities per the note  [id number]}  MEDICATIONS:  Continue current medications without change  FUTURE APPOINTMENTS:       - Follow-up visit in 3 months         INTERVAL HISTORY:  Last seen 5/2021- increased metformin to 2000mg for antipsychotic weight gain. Patient wanting to go back up on topiramate- SI after last time the medication was increased. Patient feels it wasn't related to her topiramate dosing. She was going to discuss with psychiatry first.     Since last seen- struggling with significant depression- psychiatry made some med adjustments 7/1/2021. Has been having difficulty avoiding snacking and stress eating. Food makes her feel better. Lots of stress. Hasn't found any good alternative to eating. Doesn't currently work with a therapist but does see a counselor weekly where she lives.     Doesn't feel topiramate is helpful, feels she should maybe stop this. Discussed taper and she decided to stay on it. Has not discussed with psychiatry about increase.     Doing 2 slimfast daily and 1 meal on own - has started and stopped this on and off over the months but wants to restart this.      CURRENT WEIGHT:   253 lbs 0 oz                  Changes and Difficulties 7/6/2021   I have made the following changes to my diet since my last visit: no   With regards to my diet, I am still struggling with: with weight gain   I have made the following changes to my activity/exercise since my last visit: no   With regards to my activity/exercise, I am still struggling with: depression not feeling it       VITALS:  Ht 1.6 m (5' 3\")   Wt 114.8 kg (253 lb)   BMI 44.82 kg/m      MEDICATIONS:   Current Outpatient Medications   Medication Sig Dispense Refill     ACETAMINOPHEN EXTRA STRENGTH 500 MG tablet TAKE 1 TAB BY MOUTH THREE TIMES DAILY       amLODIPine (NORVASC) 10 MG tablet TAKE 1 TABLET BY MOUTH EVERY DAY DX HIGH BLOOD PRESSURE  11     atomoxetine (STRATTERA) 100 MG capsule One cap QAM (with one " 25mg cap for total of 125mg QAM). Take after food.       atomoxetine (STRATTERA) 25 MG capsule One cap QAM (with one 100mg cap for total of 125mg QAM). Take after food.       azelastine (OPTIVAR) 0.05 % ophthalmic solution INSTILL 1 DROP INTO BOTH EYES TWICE DAILY       benzoyl peroxide 5 % external liquid        benztropine (COGENTIN) 0.5 MG tablet Take 0.5 mg by mouth       Calcium Polycarbophil (FIBER) 625 MG tablet TAKE 2 TABS (1,250mg) BY MOUTH TWICE DAILY       carvedilol (COREG) 12.5 MG tablet TAKE 3 TABLETS (37.5mg) BY MOUTH TWICE DAILY DX HIGH BLOOD PRESSURE  11     cholecalciferol (VITAMIN D-1000 MAX ST) 1000 units TABS Take 1 tablet by mouth every 24 hours       clindamycin (CLEOCIN T) 1 % external lotion        cloZAPine (CLOZARIL) 100 MG tablet 400mg QHS. (4 tabs QHS)       cyclobenzaprine (FLEXERIL) 10 MG tablet Take 1 tablet (10 mg) by mouth 3 times daily 90 tablet 1     cyclobenzaprine (FLEXERIL) 10 MG tablet Take 10 mg by mouth 2 times daily       diazepam (VALIUM) 10 MG tablet TAKE 1 TAB BY MOUTH THREE TIMES DAILY.IF TOO SEDATING ,CAN TAKE 1 2 TAB (5MG)  4     diphenhydrAMINE HCl 50 MG TABS One tab QID prn.       fluticasone (FLOVENT HFA) 110 MCG/ACT inhaler Inhale 110 mcg into the lungs       GABAPENTIN PO Take 1,200 mg by mouth 2 times daily Two tablets twice daily       hydrocortisone (CORTAID) 1 % external cream        ketoconazole (NIZORAL) 2 % external cream        LACTASE ENZYME 3000 units tablet TAKE 1 TAB BY MOUTH THREE TIMES DAILY AS NEEDED WITH FIRST BITE OF DAIRY CONTAINING FOOD       LANsoprazole (PREVACID) 30 MG DR capsule TAKE 1 CAP BY MOUTH ONCE DAILY 30 MINS PRIOR TO FIRST MEAL OF THE DAY 90 capsule 3     levothyroxine (SYNTHROID/LEVOTHROID) 88 MCG tablet TAKE 1 TAB BY MOUTH ONCE DAILY ON AN EMPTY STOMACH AND 30 MINUTES PRIOR TO OTHER MEDICATIONS       LEVOTHYROXINE SODIUM PO Take 112 mcg by mouth       lisinopril (PRINIVIL/ZESTRIL) 5 MG tablet TAKE 1 TAB BY MOUTH ONCE DAILY  11      lithium (ESKALITH) 600 MG capsule Take 1,200 mg by mouth daily        LORazepam (ATIVAN) 2 MG tablet TAKE 1 TAB BY MOUTH THREE TIMES DAILY  4     Lubricants (SURGICAL LUBRICANT) external gel        LUBRICATING PLUS EYE DROPS 0.5 % SOLN ophthalmic solution INSTILL 1 DROP INTO EACH EYE FOUR TIMES DAILY  11     lurasidone (LATUDA) 40 MG TABS tablet Take by mouth daily with food       magnesium citrate 1.745 GM/30ML solution Take 296 mLs by mouth once       melatonin 5 MG tablet Take 5 mg by mouth       metFORMIN (GLUCOPHAGE-XR) 500 MG 24 hr tablet Take 2 tablets (1,000 mg) by mouth 2 times daily (with meals) 120 tablet 3     Multiple Vitamin (DAILY-KONSTANTIN) TABS Take 1 tablet by mouth At Bedtime       Multiple Vitamins-Minerals (CERTA-KONSTANTIN PO)        MYRBETRIQ 25 MG 24 hr tablet TAKE 1 TAB BY MOUTH ONCE DAILY       nystatin-triamcinolone (MYCOLOG) 111834-7.1 UNIT/GM-% external ointment APPLY TO AFFECTED AREA(S) ON LABIA TWICE DAILY FOR ONE WEEK FOR CANDIDA       olopatadine (PATANOL) 0.1 % ophthalmic solution INSTILL 1 DROP TO BOTH EYES TWICE A DAY  5     oxybutynin (DITROPAN-XL) 10 MG 24 hr tablet TAKE 1 TAB BY MOUTH ONCE DAILY  99     Paliperidone (INVEGA PO) Take 9 mg by mouth       paliperidone (INVEGA SUSTENNA) 234 MG/1.5ML SUSP Inject 234 mg into the muscle       PARoxetine (PAXIL) 20 MG tablet TAKE 1/2 TAB (10MG) BY MOUTH DAILY FOR 1 WEEK TAKE WITH FOOD;TAKE 1 TAB BY MOUTH DAILY TAKE WITH FOOD       polyethylene glycol (MIRALAX) powder Take 17 g (1 capful) by mouth daily 510 g 11     simethicone (MYLICON) 125 MG chewable tablet Take 125 mg by mouth 2 times daily       SM MICONAZOLE 7 2 % cream INSERT 1 APPLICATORFUL VAGINALLY AT BEDTIME FOR 7 DAYS       SM NICOTINE POLACRILEX 2 MG gum CHEW 1 PIECE EVERY HOUR AS NEEDED FOR NICOTINE CRAVINGS  16     topiramate (TOPAMAX) 25 MG tablet Take 3 tablets (75 mg) by mouth 2 times daily 180 tablet 2     triamcinolone (KENALOG) 0.025 % cream APPLY TWICE DAILY TO RASH ON  "WRIST AND UNDER ARMPITS  1     triamcinolone (KENALOG) 0.1 % external cream APPLY TOPICALLY TO BOTH HANDS TWICE DAILY AS NEEDED FOR 2 WEEKS AT A TIME       valACYclovir (VALTREX) 500 MG tablet TAKE 1 TAB BY MOUTH DAILY       VENLAFAXINE HCL PO Take 375 mg by mouth 2 times daily ER       VITAMIN D, CHOLECALCIFEROL, PO Take 2,000 Units by mouth daily          Weight Loss Medication History Reviewed With Patient 7/6/2021   Which weight loss medications are you currently taking on a regular basis?  Topamax (topiramate)   Are you having any side effects from the weight loss medication that we have prescribed you? No   If you are having side effects please describe: none       Orders Only on 07/23/2018   Component Date Value Ref Range Status     Specimen Description 07/23/2018 Feces   Final     C Diff Toxin B PCR 07/23/2018 Positive* NEG^Negative Final    Comment: Positive: Toxin producing Clostridium difficile target DNA sequences detected,   presumed positive for Clostridium difficile toxin B.  Clostridium difficile (Requires Enteric Isolation)  FDA approved assay performed using [employer] GeneXpert real-time PCR.           PHYSICAL EXAM:  Objective    Ht 1.6 m (5' 3\")   Wt 114.8 kg (253 lb)   BMI 44.82 kg/m    Vitals - Patient Reported  Pain Score: No Pain (0)        Physical Exam   healthy, alert and no distress  PSYCH: Alert and oriented times 3; coherent speech, normal   rate and volume, able to articulate logical thoughts, able   to abstract reason, no tangential thoughts, no hallucinations   or delusions  Her affect is tearful  RESP: No cough, no audible wheezing, able to talk in full sentences  Remainder of exam unable to be completed due to telephone visits      Sincerely,    Jessica Contreras NP  "

## 2021-07-07 RX ORDER — METFORMIN HCL 500 MG
1000 TABLET, EXTENDED RELEASE 24 HR ORAL 2 TIMES DAILY WITH MEALS
Qty: 120 TABLET | Refills: 3 | Status: SHIPPED | OUTPATIENT
Start: 2021-07-07 | End: 2021-11-12

## 2021-07-07 RX ORDER — TOPIRAMATE 25 MG/1
75 TABLET, FILM COATED ORAL 2 TIMES DAILY
Qty: 180 TABLET | Refills: 2 | Status: SHIPPED | OUTPATIENT
Start: 2021-07-07 | End: 2021-11-11

## 2021-07-08 ENCOUNTER — TELEPHONE (OUTPATIENT)
Dept: ENDOCRINOLOGY | Facility: CLINIC | Age: 41
End: 2021-07-08

## 2021-07-08 ENCOUNTER — THERAPY VISIT (OUTPATIENT)
Dept: PHYSICAL THERAPY | Facility: CLINIC | Age: 41
End: 2021-07-08
Payer: COMMERCIAL

## 2021-07-08 DIAGNOSIS — G89.29 CHRONIC MIDLINE THORACIC BACK PAIN: Primary | ICD-10-CM

## 2021-07-08 DIAGNOSIS — M54.6 CHRONIC MIDLINE THORACIC BACK PAIN: Primary | ICD-10-CM

## 2021-07-08 PROCEDURE — 97110 THERAPEUTIC EXERCISES: CPT | Mod: GP | Performed by: PHYSICAL THERAPIST

## 2021-07-08 PROCEDURE — 97112 NEUROMUSCULAR REEDUCATION: CPT | Mod: GP | Performed by: PHYSICAL THERAPIST

## 2021-07-08 NOTE — TELEPHONE ENCOUNTER
LVMx1 to schedule 3 month follow up appointment with Jessica Contreras. Left Weight MGMT call number. Unable to send MyC due to pending MyC.

## 2021-07-12 ENCOUNTER — OFFICE VISIT (OUTPATIENT)
Dept: ORTHOPEDICS | Facility: CLINIC | Age: 41
End: 2021-07-12
Payer: COMMERCIAL

## 2021-07-12 VITALS — WEIGHT: 253 LBS | HEIGHT: 63 IN | BODY MASS INDEX: 44.83 KG/M2

## 2021-07-12 DIAGNOSIS — M54.50 CHRONIC BILATERAL LOW BACK PAIN WITHOUT SCIATICA: Primary | ICD-10-CM

## 2021-07-12 DIAGNOSIS — M54.6 CHRONIC MIDLINE THORACIC BACK PAIN: ICD-10-CM

## 2021-07-12 DIAGNOSIS — G89.29 CHRONIC BILATERAL LOW BACK PAIN WITHOUT SCIATICA: Primary | ICD-10-CM

## 2021-07-12 DIAGNOSIS — G89.29 CHRONIC MIDLINE THORACIC BACK PAIN: ICD-10-CM

## 2021-07-12 PROCEDURE — 99213 OFFICE O/P EST LOW 20 MIN: CPT | Performed by: FAMILY MEDICINE

## 2021-07-12 RX ORDER — CYCLOBENZAPRINE HCL 10 MG
10 TABLET ORAL 3 TIMES DAILY
Qty: 90 TABLET | Refills: 1 | Status: SHIPPED | OUTPATIENT
Start: 2021-07-12 | End: 2021-10-05

## 2021-07-12 ASSESSMENT — MIFFLIN-ST. JEOR: SCORE: 1781.73

## 2021-07-12 NOTE — NURSING NOTE
"Reason For Visit:   Chief Complaint   Patient presents with     RECHECK     6 week follow up mid-low back pain        Ht 1.6 m (5' 3\")   Wt 114.8 kg (253 lb)   BMI 44.82 kg/m           Neri Webb ATC  "

## 2021-07-12 NOTE — LETTER
7/12/2021      RE: Jonathon Broussard  [address]  [address]  [facility] [address]       ASSESSMENT/PLAN:    (M54.5,  G89.29) Chronic bilateral low back pain without sciatica  (primary encounter diagnosis)  Comment: will cont PT; add chiropractic assessment/ tx; refilled flexeril; reviewed red flags; f/u prn  Plan: Chiropractic Referral, cyclobenzaprine (FLEXERIL) 10 MG tablet          (M54.6,  G89.29) Chronic midline thoracic back pain  Comment: see above  Plan: Chiropractic Referral, cyclobenzaprine (FLEXERIL) 10 MG tablet          Jarad Quintero MD  July 12, 2021  11:56 AM      Pt is a 41 year old female last seen on 6/1/2021 here for follow up of:     Mid/Low back pain - mid back pain improved; pain now mostly low back   When she does dishes and laundry her low back hurts. Walking is okay, which was painful before. Muscle relaxers and PT are helping. Reports PT is going great both in the office and at home.   Does exercises daily       Per last PT note on 7/8/2021:   Continues to be very depressed.  Able to go for a walk, and also able to do some of her exercises.         Per my last note:     (M54.6,  G89.29) Chronic midline thoracic back pain  (primary encounter diagnosis)  Comment: majority of pain seems postural in nature; will have her do some strengthening and postural training   Plan: XR Thoracic Spine 2 Views, PHYSICAL THERAPY REFERRAL (Internal)          (M54.5,  G89.29) Chronic bilateral low back pain without sciatica  Comment: see above   Plan: X-ray Lumbar Spine 2-3 views*, PHYSICAL THERAPY REFERRAL (Internal)    Past Medical History:   Diagnosis Date     ADHD (attention deficit hyperactivity disorder)      Bipolar I disorder, most recent episode (or current) unspecified '98    initially dx as depression, then bipolar     Calculus of gallbladder without mention of cholecystitis or obstruction 5/2015     Cannabis dependence, in remission (H) 3/04    treatment x2, last use 6/06     Fibromyalgia       Gastro-oesophageal reflux disease      HTN (hypertension)      Juvenile idiopathic arthritis (H)      OCD (obsessive compulsive disorder)      Snores      Thyroid disease     hypothyroidism      Current Outpatient Medications   Medication Sig Dispense Refill     ACETAMINOPHEN EXTRA STRENGTH 500 MG tablet TAKE 1 TAB BY MOUTH THREE TIMES DAILY       amLODIPine (NORVASC) 10 MG tablet TAKE 1 TABLET BY MOUTH EVERY DAY DX HIGH BLOOD PRESSURE  11     atomoxetine (STRATTERA) 100 MG capsule One cap QAM (with one 25mg cap for total of 125mg QAM). Take after food.       atomoxetine (STRATTERA) 25 MG capsule One cap QAM (with one 100mg cap for total of 125mg QAM). Take after food.       azelastine (OPTIVAR) 0.05 % ophthalmic solution INSTILL 1 DROP INTO BOTH EYES TWICE DAILY       benzoyl peroxide 5 % external liquid        benztropine (COGENTIN) 0.5 MG tablet Take 0.5 mg by mouth       Calcium Polycarbophil (FIBER) 625 MG tablet TAKE 2 TABS (1,250mg) BY MOUTH TWICE DAILY       carvedilol (COREG) 12.5 MG tablet TAKE 3 TABLETS (37.5mg) BY MOUTH TWICE DAILY DX HIGH BLOOD PRESSURE  11     cholecalciferol (VITAMIN D-1000 MAX ST) 1000 units TABS Take 1 tablet by mouth every 24 hours       clindamycin (CLEOCIN T) 1 % external lotion        cloZAPine (CLOZARIL) 100 MG tablet 400mg QHS. (4 tabs QHS)       cyclobenzaprine (FLEXERIL) 10 MG tablet Take 1 tablet (10 mg) by mouth 3 times daily 90 tablet 1     cyclobenzaprine (FLEXERIL) 10 MG tablet Take 10 mg by mouth 2 times daily       diazepam (VALIUM) 10 MG tablet TAKE 1 TAB BY MOUTH THREE TIMES DAILY.IF TOO SEDATING ,CAN TAKE 1 2 TAB (5MG)  4     diphenhydrAMINE HCl 50 MG TABS One tab QID prn.       fluticasone (FLOVENT HFA) 110 MCG/ACT inhaler Inhale 110 mcg into the lungs       GABAPENTIN PO Take 1,200 mg by mouth 2 times daily Two tablets twice daily       hydrocortisone (CORTAID) 1 % external cream        ketoconazole (NIZORAL) 2 % external cream        LACTASE ENZYME 3000  units tablet TAKE 1 TAB BY MOUTH THREE TIMES DAILY AS NEEDED WITH FIRST BITE OF DAIRY CONTAINING FOOD       LANsoprazole (PREVACID) 30 MG DR capsule TAKE 1 CAP BY MOUTH ONCE DAILY 30 MINS PRIOR TO FIRST MEAL OF THE DAY 90 capsule 3     levothyroxine (SYNTHROID/LEVOTHROID) 88 MCG tablet TAKE 1 TAB BY MOUTH ONCE DAILY ON AN EMPTY STOMACH AND 30 MINUTES PRIOR TO OTHER MEDICATIONS       LEVOTHYROXINE SODIUM PO Take 112 mcg by mouth       lisinopril (PRINIVIL/ZESTRIL) 5 MG tablet TAKE 1 TAB BY MOUTH ONCE DAILY  11     lithium (ESKALITH) 600 MG capsule Take 1,200 mg by mouth daily        LORazepam (ATIVAN) 2 MG tablet TAKE 1 TAB BY MOUTH THREE TIMES DAILY  4     Lubricants (SURGICAL LUBRICANT) external gel        LUBRICATING PLUS EYE DROPS 0.5 % SOLN ophthalmic solution INSTILL 1 DROP INTO EACH EYE FOUR TIMES DAILY  11     lurasidone (LATUDA) 40 MG TABS tablet Take by mouth daily with food       magnesium citrate 1.745 GM/30ML solution Take 296 mLs by mouth once       melatonin 5 MG tablet Take 5 mg by mouth       metFORMIN (GLUCOPHAGE-XR) 500 MG 24 hr tablet Take 2 tablets (1,000 mg) by mouth 2 times daily (with meals) 120 tablet 3     Multiple Vitamin (DAILY-KONSTANTIN) TABS Take 1 tablet by mouth At Bedtime       Multiple Vitamins-Minerals (CERTA-KONSTANTIN PO)        MYRBETRIQ 25 MG 24 hr tablet TAKE 1 TAB BY MOUTH ONCE DAILY       nystatin-triamcinolone (MYCOLOG) 346926-8.1 UNIT/GM-% external ointment APPLY TO AFFECTED AREA(S) ON LABIA TWICE DAILY FOR ONE WEEK FOR CANDIDA       olopatadine (PATANOL) 0.1 % ophthalmic solution INSTILL 1 DROP TO BOTH EYES TWICE A DAY  5     oxybutynin (DITROPAN-XL) 10 MG 24 hr tablet TAKE 1 TAB BY MOUTH ONCE DAILY  99     Paliperidone (INVEGA PO) Take 9 mg by mouth       paliperidone (INVEGA SUSTENNA) 234 MG/1.5ML SUSP Inject 234 mg into the muscle       PARoxetine (PAXIL) 20 MG tablet TAKE 1/2 TAB (10MG) BY MOUTH DAILY FOR 1 WEEK TAKE WITH FOOD;TAKE 1 TAB BY MOUTH DAILY TAKE WITH FOOD        "polyethylene glycol (MIRALAX) powder Take 17 g (1 capful) by mouth daily 510 g 11     simethicone (MYLICON) 125 MG chewable tablet Take 125 mg by mouth 2 times daily       SM MICONAZOLE 7 2 % cream INSERT 1 APPLICATORFUL VAGINALLY AT BEDTIME FOR 7 DAYS       SM NICOTINE POLACRILEX 2 MG gum CHEW 1 PIECE EVERY HOUR AS NEEDED FOR NICOTINE CRAVINGS  16     topiramate (TOPAMAX) 25 MG tablet Take 3 tablets (75 mg) by mouth 2 times daily 180 tablet 2     triamcinolone (KENALOG) 0.025 % cream APPLY TWICE DAILY TO RASH ON WRIST AND UNDER ARMPITS  1     triamcinolone (KENALOG) 0.1 % external cream APPLY TOPICALLY TO BOTH HANDS TWICE DAILY AS NEEDED FOR 2 WEEKS AT A TIME       valACYclovir (VALTREX) 500 MG tablet TAKE 1 TAB BY MOUTH DAILY       VENLAFAXINE HCL PO Take 375 mg by mouth 2 times daily ER       VITAMIN D, CHOLECALCIFEROL, PO Take 2,000 Units by mouth daily         Allergies   Allergen Reactions     Haldol [Haloperidol] Tinnitus     Zyprexa [Olanzapine] Visual Disturbance and Other (See Comments)     akathesia     Seasonal Allergies       ROS:   Gen- no fevers/chills   Neuro - no numbness, no tingling   Remainder of ROS negative.     Exam:   Ht 1.6 m (5' 3\")   Wt 114.8 kg (253 lb)   BMI 44.82 kg/m      Back:  ROM - limited in extension  +mild TTP midline lower lumbar and SI         Jarad Quintero MD    "

## 2021-07-15 ENCOUNTER — THERAPY VISIT (OUTPATIENT)
Dept: PHYSICAL THERAPY | Facility: CLINIC | Age: 41
End: 2021-07-15
Payer: COMMERCIAL

## 2021-07-15 DIAGNOSIS — M54.50 CHRONIC BILATERAL LOW BACK PAIN WITHOUT SCIATICA: Primary | ICD-10-CM

## 2021-07-15 DIAGNOSIS — G89.29 CHRONIC BILATERAL LOW BACK PAIN WITHOUT SCIATICA: Primary | ICD-10-CM

## 2021-07-15 PROCEDURE — 97110 THERAPEUTIC EXERCISES: CPT | Mod: GP | Performed by: PHYSICAL THERAPIST

## 2021-07-15 PROCEDURE — 97530 THERAPEUTIC ACTIVITIES: CPT | Mod: GP | Performed by: PHYSICAL THERAPIST

## 2021-07-28 ENCOUNTER — THERAPY VISIT (OUTPATIENT)
Dept: PHYSICAL THERAPY | Facility: CLINIC | Age: 41
End: 2021-07-28
Payer: COMMERCIAL

## 2021-07-28 DIAGNOSIS — M54.50 CHRONIC BILATERAL LOW BACK PAIN WITHOUT SCIATICA: Primary | ICD-10-CM

## 2021-07-28 DIAGNOSIS — G89.29 CHRONIC BILATERAL LOW BACK PAIN WITHOUT SCIATICA: Primary | ICD-10-CM

## 2021-07-28 PROCEDURE — 97112 NEUROMUSCULAR REEDUCATION: CPT | Mod: GP | Performed by: PHYSICAL THERAPIST

## 2021-07-28 PROCEDURE — 97110 THERAPEUTIC EXERCISES: CPT | Mod: GP | Performed by: PHYSICAL THERAPIST

## 2021-08-16 ENCOUNTER — THERAPY VISIT (OUTPATIENT)
Dept: PHYSICAL THERAPY | Facility: CLINIC | Age: 41
End: 2021-08-16
Payer: COMMERCIAL

## 2021-08-16 DIAGNOSIS — M54.50 CHRONIC BILATERAL LOW BACK PAIN WITHOUT SCIATICA: Primary | ICD-10-CM

## 2021-08-16 DIAGNOSIS — G89.29 CHRONIC BILATERAL LOW BACK PAIN WITHOUT SCIATICA: Primary | ICD-10-CM

## 2021-08-16 PROCEDURE — 97110 THERAPEUTIC EXERCISES: CPT | Mod: GP | Performed by: PHYSICAL THERAPIST

## 2021-08-16 PROCEDURE — 97112 NEUROMUSCULAR REEDUCATION: CPT | Mod: GP | Performed by: PHYSICAL THERAPIST

## 2021-08-17 NOTE — PROGRESS NOTES
DISCHARGE REPORT    Progress reporting period is from 6/25/21 to 8/16/21.       SUBJECTIVE  Subjective: Back pain is much better.  Feels confident to continue her HEP independently.    Changes in function:  Yes (See Goal flowsheet attached for changes in current functional level)  Adverse reaction to treatment or activity: None    OBJECTIVE  Changes noted in objective findings:  Yes  Objective: Improving core stability.  Normal gait pattern.  Able to increase resistance with HEP>     ASSESSMENT/PLAN  Updated problem list and treatment plan: Diagnosis 1:  Back pain    STG/LTGs have been met or progress has been made towards goals:  Yes (See Goal flow sheet completed today.)  Assessment of Progress: The patient's condition is improving.  Self Management Plans:  Patient has been instructed in a home treatment program.  I have re-evaluated this patient and find that the nature, scope, duration and intensity of the therapy is appropriate for the medical condition of the patient.  Jonathon continues to require the following intervention to meet STG and LTG's:  PT intervention is no longer required to meet STG/LTG.    Recommendations:  This patient is ready to be discharged from therapy and continue their home treatment program.    Please refer to the daily flowsheet for treatment today, total treatment time and time spent performing 1:1 timed codes.

## 2021-10-05 ENCOUNTER — TELEPHONE (OUTPATIENT)
Dept: ORTHOPEDICS | Facility: CLINIC | Age: 41
End: 2021-10-05

## 2021-10-05 DIAGNOSIS — G89.29 CHRONIC BILATERAL LOW BACK PAIN WITHOUT SCIATICA: ICD-10-CM

## 2021-10-05 DIAGNOSIS — M54.50 CHRONIC BILATERAL LOW BACK PAIN WITHOUT SCIATICA: ICD-10-CM

## 2021-10-05 DIAGNOSIS — G89.29 CHRONIC MIDLINE THORACIC BACK PAIN: ICD-10-CM

## 2021-10-05 DIAGNOSIS — M54.6 CHRONIC MIDLINE THORACIC BACK PAIN: ICD-10-CM

## 2021-10-05 RX ORDER — CYCLOBENZAPRINE HCL 10 MG
10 TABLET ORAL 3 TIMES DAILY
Qty: 90 TABLET | Refills: 3 | Status: SHIPPED | OUTPATIENT
Start: 2021-10-05

## 2021-10-05 NOTE — TELEPHONE ENCOUNTER
I left a message for the patient stating that her medication refill request was sent to her pharmacy. Call back number was given for further questions.     Kylie, ATC

## 2021-10-05 NOTE — TELEPHONE ENCOUNTER
[facility] Call Center    Phone Message    May a detailed message be left on voicemail: yes     Reason for Call: Medication Refill Request    Has the patient contacted the pharmacy for the refill? Yes   Name of medication being requested: cyclobenzaprine (FLEXERIL) 10 MG tablet  Provider who prescribed the medication:   Pharmacy: [facility] in [place], [place]  Date medication is needed: 10/5       Action Taken: Other:  sports med    Travel Screening: Not Applicable

## 2021-10-05 NOTE — TELEPHONE ENCOUNTER
Refill sent. Team - please inform pt. Thank you!    Jarad Quintero MD  October 5, 2021  2:03 PM

## 2021-10-06 ENCOUNTER — VIRTUAL VISIT (OUTPATIENT)
Dept: ENDOCRINOLOGY | Facility: CLINIC | Age: 41
End: 2021-10-06
Payer: COMMERCIAL

## 2021-10-06 VITALS — WEIGHT: 255.2 LBS | HEIGHT: 64 IN | BODY MASS INDEX: 43.57 KG/M2

## 2021-10-06 DIAGNOSIS — E66.01 CLASS 2 SEVERE OBESITY DUE TO EXCESS CALORIES WITH SERIOUS COMORBIDITY AND BODY MASS INDEX (BMI) OF 38.0 TO 38.9 IN ADULT (H): ICD-10-CM

## 2021-10-06 DIAGNOSIS — R63.5 WEIGHT GAIN DUE TO MEDICATION: ICD-10-CM

## 2021-10-06 DIAGNOSIS — T50.905A WEIGHT GAIN DUE TO MEDICATION: ICD-10-CM

## 2021-10-06 DIAGNOSIS — E66.01 MORBID OBESITY (H): Primary | ICD-10-CM

## 2021-10-06 DIAGNOSIS — E66.812 CLASS 2 SEVERE OBESITY DUE TO EXCESS CALORIES WITH SERIOUS COMORBIDITY AND BODY MASS INDEX (BMI) OF 38.0 TO 38.9 IN ADULT (H): ICD-10-CM

## 2021-10-06 PROCEDURE — 99215 OFFICE O/P EST HI 40 MIN: CPT | Mod: TEL | Performed by: NURSE PRACTITIONER

## 2021-10-06 RX ORDER — SEMAGLUTIDE 0.25 MG/.5ML
0.25 INJECTION, SOLUTION SUBCUTANEOUS
Qty: 0.5 ML | Refills: 0 | Status: SHIPPED | OUTPATIENT
Start: 2021-10-06 | End: 2022-01-06

## 2021-10-06 RX ORDER — SEMAGLUTIDE 0.5 MG/.5ML
0.5 INJECTION, SOLUTION SUBCUTANEOUS
Qty: 0.5 ML | Refills: 1 | Status: SHIPPED | OUTPATIENT
Start: 2021-10-06 | End: 2022-01-06

## 2021-10-06 RX ORDER — ASCORBIC ACID 100 MG
TABLET,CHEWABLE ORAL
COMMUNITY

## 2021-10-06 ASSESSMENT — MIFFLIN-ST. JEOR: SCORE: 1799.64

## 2021-10-06 ASSESSMENT — PAIN SCALES - GENERAL: PAINLEVEL: NO PAIN (0)

## 2021-10-06 NOTE — LETTER
10/6/2021       RE: Jonathon Broussard  [address]  [address]  [facility] [address]     Dear Colleague,    Thank you for referring your patient, Jonathon Broussard, to the [facility]. Please see a copy of my visit note below.    Jonathon is a 41 year old who is being evaluated via a billable telephone visit.      What phone number would you like to be contacted at? [phone]  How would you like to obtain your AVS? MyChart  Phone call duration: 18 Minutes      40 minutes spent on the date of the encounter doing chart review, history and exam, documentation and further activities per the note    Return Medical Weight Management Note     Jonathon Broussard  MRN:  [id number]  :  1980  PO:  10/6/2021    Dear No primary care provider on file.,    I had the pleasure of seeing your patient Jonathon Broussard. She is a 41 year old female who I am continuing to see for treatment of obesity related to:       2020   I have the following health issues associated with obesity: High Blood Pressure, Sleep Apnea, GERD (Reflux), Asthma   I have the following symptoms associated with obesity: None of the above       Assessment & Plan   Problem List Items Addressed This Visit        Digestive    Morbid obesity (H) - Primary     Mood up and down over the last 3 months. Weight fluctuated but mostly weight stable. This is great consider medication changes and mood. Stable on metformin 2000mg and topiramate 75mg twice daily. Patient again would like to increase topiramate to 100mg twice daily but has not discussed with psychiatrist. Concern is that last time she was on that dose she had SI.     [place] is starting to cover AOM and patient would benefit from GLP1 given antipsychotic polypharmacy. Will try to get Wegovy approved. Will need help getting started if approved.     Looking for a therapist. I think this will be super  beneficial for her.   Discussed how food makes her feel good when she is stressed or anxious or depressed. She feels it is beneficial to talk to someone when she feels like that as well. Discussed options to try before food including talking to staff or talking to brother by phone and taking walk. Discussed food options for when she was feeling stressed as well.              Relevant Medications    Semaglutide-Weight Management (WEGOVY) 0.25 MG/0.5ML SOAJ    Semaglutide-Weight Management (WEGOVY) 0.5 MG/0.5ML SOAJ    Other Relevant Orders    Med Therapy Management Referral      Other Visit Diagnoses     Class 2 severe obesity due to excess calories with serious comorbidity and body mass index (BMI) of 38.0 to 38.9 in adult (H)        Relevant Medications    Semaglutide-Weight Management (WEGOVY) 0.25 MG/0.5ML SOAJ    Semaglutide-Weight Management (WEGOVY) 0.5 MG/0.5ML SOAJ    Other Relevant Orders    Med Therapy Management Referral    Weight gain due to medication        Relevant Medications    Semaglutide-Weight Management (WEGOVY) 0.25 MG/0.5ML SOAJ    Semaglutide-Weight Management (WEGOVY) 0.5 MG/0.5ML SOAJ    Other Relevant Orders    Med Therapy Management Referral             INTERVAL HISTORY:  Last seen 7/6/2021 - patient requesting increase in topiramate at that time. She had not discussed it with her psychiatrist. She has hx of SI shortly after topiramate increase in the past. Frustrated with weight. Call abruptly ended when she became frustrated that I couldn't increase topiramate.     Med options limited by insurance and mental health. Already on 2000mg metformin and 75mg BID of topiramate. strattera so phentermine contraindicated.     Today:   Depression improving recently   Weight up and down over the last 4 months. July was 246 otherwise between 255 and 253.   Catatonia issues recently- sometimes all day or part of the day. Can still get up and eat and shower but not much else. Less active since July  "  Likes to eat - helps her \"feel better\" - looks for \"low fat\" things like twizzlers, pickles, and mandarin oranges     interested in Wegovy. No hx pancreatitis. S/p lap edna. No fam hx medullary thyroid cancer. [place] has recently started covering AOM. Due to polypharmacy and psych dx this would be safest option for her.     CURRENT WEIGHT:   255 lbs 3.2 oz    Initial Weight (lbs): 228 lbs  Last Visits Weight: 110.6 kg (243 lb 14.4 oz)  Cumulative weight loss (lbs): -27.2  Weight Loss Percentage: -11.93%    Changes and Difficulties 10/6/2021   I have made the following changes to my diet since my last visit: yes doing her own meals frozen meals   With regards to my diet, I am still struggling with: yes still feeling hungry want to increase meds   I have made the following changes to my activity/exercise since my last visit: yes doing more walking   With regards to my activity/exercise, I am still struggling with: no         MEDICATIONS:   Current Outpatient Medications   Medication Sig Dispense Refill     ACETAMINOPHEN EXTRA STRENGTH 500 MG tablet TAKE 1 TAB BY MOUTH THREE TIMES DAILY       amLODIPine (NORVASC) 10 MG tablet TAKE 1 TABLET BY MOUTH EVERY DAY DX HIGH BLOOD PRESSURE  11     Ascorbic Acid (VITAMIN C) 100 MG CHEW        atomoxetine (STRATTERA) 100 MG capsule One cap QAM (with one 25mg cap for total of 125mg QAM). Take after food.       atomoxetine (STRATTERA) 25 MG capsule One cap QAM (with one 100mg cap for total of 125mg QAM). Take after food.       azelastine (OPTIVAR) 0.05 % ophthalmic solution INSTILL 1 DROP INTO BOTH EYES TWICE DAILY       benzoyl peroxide 5 % external liquid        benztropine (COGENTIN) 0.5 MG tablet Take 0.5 mg by mouth       Calcium Polycarbophil (FIBER) 625 MG tablet TAKE 2 TABS (1,250mg) BY MOUTH TWICE DAILY       carvedilol (COREG) 12.5 MG tablet TAKE 3 TABLETS (37.5mg) BY MOUTH TWICE DAILY DX HIGH BLOOD PRESSURE  11     cholecalciferol (VITAMIN D-1000 MAX ST) 1000 units TABS " Take 1 tablet by mouth every 24 hours       clindamycin (CLEOCIN T) 1 % external lotion        cloZAPine (CLOZARIL) 100 MG tablet 400mg QHS. (4 tabs QHS)       cyclobenzaprine (FLEXERIL) 10 MG tablet Take 1 tablet (10 mg) by mouth 3 times daily 90 tablet 3     diazepam (VALIUM) 10 MG tablet TAKE 1 TAB BY MOUTH THREE TIMES DAILY.IF TOO SEDATING ,CAN TAKE 1 2 TAB (5MG)  4     diphenhydrAMINE HCl 50 MG TABS One tab QID prn.       fluticasone (FLOVENT HFA) 110 MCG/ACT inhaler Inhale 110 mcg into the lungs       GABAPENTIN PO Take 1,200 mg by mouth 2 times daily Two tablets twice daily       hydrocortisone (CORTAID) 1 % external cream        ketoconazole (NIZORAL) 2 % external cream        LACTASE ENZYME 3000 units tablet TAKE 1 TAB BY MOUTH THREE TIMES DAILY AS NEEDED WITH FIRST BITE OF DAIRY CONTAINING FOOD       LANsoprazole (PREVACID) 30 MG DR capsule TAKE 1 CAP BY MOUTH ONCE DAILY 30 MINS PRIOR TO FIRST MEAL OF THE DAY 90 capsule 3     levothyroxine (SYNTHROID/LEVOTHROID) 88 MCG tablet TAKE 1 TAB BY MOUTH ONCE DAILY ON AN EMPTY STOMACH AND 30 MINUTES PRIOR TO OTHER MEDICATIONS       LEVOTHYROXINE SODIUM PO Take 112 mcg by mouth       lisinopril (PRINIVIL/ZESTRIL) 5 MG tablet TAKE 1 TAB BY MOUTH ONCE DAILY  11     lithium (ESKALITH) 600 MG capsule Take 1,200 mg by mouth daily        LORazepam (ATIVAN) 2 MG tablet TAKE 1 TAB BY MOUTH THREE TIMES DAILY  4     Lubricants (SURGICAL LUBRICANT) external gel        LUBRICATING PLUS EYE DROPS 0.5 % SOLN ophthalmic solution INSTILL 1 DROP INTO EACH EYE FOUR TIMES DAILY  11     magnesium citrate 1.745 GM/30ML solution Take 296 mLs by mouth once       melatonin 5 MG tablet Take 5 mg by mouth       metFORMIN (GLUCOPHAGE-XR) 500 MG 24 hr tablet Take 2 tablets (1,000 mg) by mouth 2 times daily (with meals) 120 tablet 3     Multiple Vitamin (DAILY-KONSTANTIN) TABS Take 1 tablet by mouth At Bedtime       Multiple Vitamins-Minerals (CERTA-KONSTANTIN PO)        MYRBETRIQ 25 MG 24 hr tablet TAKE 1  TAB BY MOUTH ONCE DAILY       nystatin-triamcinolone (MYCOLOG) 707902-9.1 UNIT/GM-% external ointment APPLY TO AFFECTED AREA(S) ON LABIA TWICE DAILY FOR ONE WEEK FOR CANDIDA       olopatadine (PATANOL) 0.1 % ophthalmic solution INSTILL 1 DROP TO BOTH EYES TWICE A DAY  5     oxybutynin (DITROPAN-XL) 10 MG 24 hr tablet TAKE 1 TAB BY MOUTH ONCE DAILY  99     Paliperidone (INVEGA PO) Take 9 mg by mouth       paliperidone (INVEGA SUSTENNA) 234 MG/1.5ML SUSP Inject 234 mg into the muscle       PARoxetine (PAXIL) 20 MG tablet TAKE 1/2 TAB (10MG) BY MOUTH DAILY FOR 1 WEEK TAKE WITH FOOD;TAKE 1 TAB BY MOUTH DAILY TAKE WITH FOOD       polyethylene glycol (MIRALAX) powder Take 17 g (1 capful) by mouth daily 510 g 11     Semaglutide-Weight Management (WEGOVY) 0.25 MG/0.5ML SOAJ Inject 0.25 mg Subcutaneous every 7 days For 4 weeks, then 0.5mg dose 0.5 mL 0     Semaglutide-Weight Management (WEGOVY) 0.5 MG/0.5ML SOAJ Inject 0.5 mg Subcutaneous every 7 days - start after 0.25mg x 4 weeks 0.5 mL 1     simethicone (MYLICON) 125 MG chewable tablet Take 125 mg by mouth 2 times daily       SM MICONAZOLE 7 2 % cream INSERT 1 APPLICATORFUL VAGINALLY AT BEDTIME FOR 7 DAYS       SM NICOTINE POLACRILEX 2 MG gum CHEW 1 PIECE EVERY HOUR AS NEEDED FOR NICOTINE CRAVINGS  16     topiramate (TOPAMAX) 25 MG tablet Take 3 tablets (75 mg) by mouth 2 times daily 180 tablet 2     triamcinolone (KENALOG) 0.025 % cream APPLY TWICE DAILY TO RASH ON WRIST AND UNDER ARMPITS  1     triamcinolone (KENALOG) 0.1 % external cream APPLY TOPICALLY TO BOTH HANDS TWICE DAILY AS NEEDED FOR 2 WEEKS AT A TIME       valACYclovir (VALTREX) 500 MG tablet TAKE 1 TAB BY MOUTH DAILY       VENLAFAXINE HCL PO Take 375 mg by mouth 2 times daily ER       VITAMIN D, CHOLECALCIFEROL, PO Take 2,000 Units by mouth daily        lurasidone (LATUDA) 40 MG TABS tablet Take 120 mg by mouth daily with food          Weight Loss Medication History Reviewed With Patient 10/6/2021   Which  "weight loss medications are you currently taking on a regular basis?  Topamax (topiramate)   Are you having any side effects from the weight loss medication that we have prescribed you? No   If you are having side effects please describe: none       PHYSICAL EXAM:  Objective      Vitals - Patient Reported  Pain Score: No Pain (0)   Ht 1.613 m (5' 3.5\")   Wt 115.8 kg (255 lb 3.2 oz)   BMI 44.50 kg/m       GENERAL: Healthy, alert and no distress  EYES: Eyes grossly normal to inspection.  No discharge or erythema, or obvious scleral/conjunctival abnormalities.  RESP: No audible wheeze, cough, or visible cyanosis.  No visible retractions or increased work of breathing.    SKIN: Visible skin clear. No significant rash, abnormal pigmentation or lesions.  NEURO: Cranial nerves grossly intact.  Mentation and speech appropriate for age.  PSYCH: Mentation appears normal, affect normal/bright, judgement and insight intact, normal speech and appearance well-groomed.        Sincerely,    Jessica Contreras NP    "

## 2021-10-06 NOTE — NURSING NOTE
"Chief Complaint   Patient presents with     Follow Up     Follow-up Weight Management       Vitals:    10/06/21 1122   Weight: 115.8 kg (255 lb 3.2 oz)   Height: 1.613 m (5' 3.5\")       Body mass index is 44.5 kg/m .                            "

## 2021-10-06 NOTE — ASSESSMENT & PLAN NOTE
Mood up and down over the last 3 months. Weight fluctuated but mostly weight stable. This is great consider medication changes and mood. Stable on metformin 2000mg and topiramate 75mg twice daily. Patient again would like to increase topiramate to 100mg twice daily but has not discussed with psychiatrist. Concern is that last time she was on that dose she had SI.     [place] is starting to cover AOM and patient would benefit from GLP1 given antipsychotic polypharmacy. Will try to get Wegovy approved. Will need help getting started if approved.     Looking for a therapist. I think this will be super beneficial for her.   Discussed how food makes her feel good when she is stressed or anxious or depressed. She feels it is beneficial to talk to someone when she feels like that as well. Discussed options to try before food including talking to staff or talking to brother by phone and taking walk. Discussed food options for when she was feeling stressed as well.

## 2021-10-06 NOTE — PATIENT INSTRUCTIONS
"Thank you for allowing us the privilege of caring for you. We hope we provided you with the excellent service you deserve.   Please let us know if there is anything else we can do for you so that we can be sure you are completely satisfied with your care experience.    To ensure the quality of our services you may be receiving a patient satisfaction survey from an independent patient satisfaction monitoring company.    The greatest compliment you can give is a \"Likely to Recommend\"    Your visit was with Jessica Contreras NP today.    Instructions per today's visit:     Varinder Broussard, it was great to visit with you today.  Here is a review of our visit.  If our clinic scheduler is not able to reach you please call [phone] to schedule your next appointments.    -Continue metformin  -Continue topiramate 75mg twice daily   -I will send WEGOVY to your pharmacy- It may not be covered by insurance yet- we can try again in future if not   -I'll have you schedule with pharmacist if WEGOVY is approved to discuss it further. We can also do video education on how to administer if needed     Goals:   Try talking to staff or brother when wanting to snack.   Ask brother to talk to you while you go for walks. Schedule the walk into your day.   Consider choosing pickles and mandarin oranges over candy for snacks when stressed if able       Information about Video Visits with [facility]: video visit information  _________________________________________________________________________________________________________________________________________________________  Important contact and scheduling information:  Please call our contact center at [phone] to schedule your next appointments.  To find a lab location near you, please call [phone].  For any nursing questions or concerns call Diana Wong LPN at [phone] or Maegan Franco RN at [phone]  Please call during clinic hours Monday through Friday " 8:00a - 4:00p if you have questions or you can contact us via [employer] at anytime and we will reply during clinic hours.    Lab results will be communicated through My Chart or letter (if My Chart not used). Please call the clinic if you have not received communication after 1 week or if you have any questions.?  Clinic Fax: [phone]  _________________________________________________________________________________________________________________________________________________________  Meal Replacement Products:    Here is the link to our new e-store where you can purchase our meal replacement products    [facility] E-Store  PlayWith/store    The one week starter kit is a great way to sample a variety of products and see what works for you.    If you want more information about the product go to: Fresh Steps Meals  [email or web address]    If you are an employee or [facility] or  [employer] please contact your care team for a 10% estore discount    Free Shipping for orders over $75     Benefits of meal replacements products:    Portion and calorie control  Improved nutrition  Structured eating  Simplified food choices  Avoid contact with trigger foods  _________________________________________________________________________________________________________________________________________________________  Interested in working with a health ?  Health coaches work with you to improve your overall health and wellbeing.  They look at the whole person, and may involve discussion of different areas of life, including, but not limited to the four pillars of health (sleep, exercise, nutrition, and stress management). Discuss with your care team if you would like to start working a health .  Health Coaching-3 Pack: Schedule by calling [phone]    $99 for three health coaching visits    Visits may be done in person or via phone    Coaching is a partnership between the   and the client; Coaches do not prescribe or diagnose    Coaching helps inspire the client to reach his/her personal goals   _________________________________________________________________________________________________________________________________________________________  24 Week Healthy Lifestyle Plan:    Our mission in the 24-week Healthy Lifestyle Plan is to provide you with individualized care by giving you the tools, education and support you need to lose weight and maintain a healthy lifestyle. In your 24-week journey, you ll be supported by a dedicated weight loss team that includes registered dietitians, medical weight management providers, health coaches, and nurses -- all with special expertise in weight loss -- to help you every step of the way.     Monthly meetings with your registered dietician or medical weight management provider help to review your progress, update your care plan, and make any adjustments needed to ensure success. Between these visits, weekly and bi-weekly health  visits will help you focus on the four pillars of weight loss -- stress, sleep, nutrition, and exercise -- and how you can best adapt each to achieve sustainable weight loss results.    In addition, you will be given exclusive access to online wellbeing classes through [employer].  Your initial visit will be with a medical weight management provider who will help to understand your weight loss goals and ensure this program is the right fit for you. Please let our team know if you are interested in the 24 week plan by sending a message to your care team or calling [phone] to schedule.  _________________________________________________________________________________________________________________________________________________________    COMPREHENSIVE WEIGHT MANAGEMENT PROGRAM  VIRTUAL SUPPORT GROUPS    For Support Group Information:      We offer support groups for patients who are working on weight  "loss and considering, preparing for or have had weight loss surgery.   There is no cost for this opportunity.  You are invited to attend the?Virtual Support Groups?provided by any of the following locations:    1. [facility] via Microsoft Teams with Do Malhotra RN  2.   [place] via ObjectVideo with Jeremy Muñoz, PhD, LP  3.   [place] via ObjectVideo with Ester Jacinto RN  4.   [facility] via [employer] Teams with Ester Byrd [facility]    The following Support Group information can also be found on our website:  [email or web address]      [facility] Weight Loss Surgery Support Group    [facility] Weight Loss Surgery Support Group  The support group is a patient-lead forum that meets monthly to share experiences, encouragement and education. It is open to those who have had weight loss surgery, are scheduled for surgery, and those who are considering surgery.   WHEN: This group meets on the 3rd Wednesday of each month from 5:00PM - 6:00PM virtually using Microsoft Teams.   FACILITATOR: Led by Do Malhotra, the program's Clinical Coordinator.   TO REGISTER: Please contact the clinic via [employer] or call the nurse line directly at [phone] to inform our staff that you would like an invite sent to you and to let us know the email you would like the invite sent to. Prior to the meeting, a link with directions on how to join the meeting will be sent to you.    2021 Meetings  August 18: \"Let's Talk\" a time for the group to share.  September 15: \"Let's Talk\" a time for the group to share.  October 20: \"Let's Talk\" a time for the group to share.  November 17: Lindsay Vail RD, GINA \"Protein, Metabolism and Meal Planning\"  December 15: Samuel Pascual RD, LD will speak, \"Recipe Modification\"    [facility] Support Groups    Connections: Bariatric Care Support Group?  This is open " "to all [facility] (and those external to this program) pre- and post- operative bariatric surgery patients as well as their support system.   WHEN: This group meets the 2nd Tuesday of each month from 6:30 PM - 8:00 PM virtually using Microsoft Teams.   FACILITATOR: Led by Jeremy Muñoz, Ph.D who is a Licensed Psychologist with the [facility] Comprehensive Weight Management Program.   TO REGISTER: Please send an email to Jeremy Muñoz, Ph.D., LP at?[email or web address]?if you would like an invitation to the group and to learn about using Microsoft Teams.    2021 Meetings  August 10: Open Forum  September 14: Guest Speaker: Ester Jacinto RN,CBN, CIC, [facility] Comprehensive Weight Management Program, \"Your Hospital Stay and Post-Operative Compliance\"  October 12: Open Forum  November 9: Guest Speaker: Pham Arshad RD,LD, [facility] Comprehensive Weight Management Program,\"Holiday Eating\".  December 14: Guest Speaker Lauren Lopez MD, MPH, [facility], [facility], \"Body Contouring Surgery for Bariatric Surgery Patients\"     Connections: Post-Operative Bariatric Surgery Support Group  This is a support group for [facility] bariatric patients (and those external to [facility]) who have had bariatric surgery and are at least 3 months post-surgery.  WHEN: This support group meets the 4th Wednesday of the month from 11:00 AM - 12:00 PM virtually using Microsoft Teams.   FACILITATOR: Led by Certified Bariatric Nurse, Ester Jacinto RN.   TO REGISTER: Please send an email to Ester at [email or web address] if you would like an invitation to the group and to learn about using [employer].      [facility] Healthy Lifestyle Virtual Support Group    Healthy Lifestyle Virtual Support Group?  This is 60 minutes of small group guided discussion, support and resources. All are welcome who want a healthy lifestyle.  WHEN: This " group meets monthly on a Friday from 12:30 PM - to 1:30 PM virtually using Microsoft Teams.   FACILITATOR: Led by National Board Certified Health , Ester Byrd, [facility].   TO REGISTER: Please send an email to Ester [email or web address] to receive monthly invites to the group or if you have any questions about having a health .  Prior to the meeting, a link with directions on how to join the meeting will be sent to you.    2021 Meetings  August 27: Open Forum  September 24: Sleep and the 7 Types of Rest  October 29: Open Forum  November 19: Gratitude     December 10: Open Forum  _________________________________________________________________________________________________________________________________________________________  [place] [facility] Get Moving Program  Our team of physical therapists is trained to help you understand and take control of your condition. They will perform a thorough evaluation to determine your ability for activity and develop a customized plan to fit your goals and physical ability.  Scheduling: Unsure if the Get Moving program is right for you? Discuss the program with your medical provider or diabetes educator. You can also call us at [phone] to ask questions or schedule an appointment.   [facility] Get Moving Program  _________________________________________________________________________________________________________________________________________________________   [employer] Diabetes Prevention Program (DPP)  If you have prediabetes and Medicare please contact us via [employer] to learn more about the Diabetes Prevention Program (DPP)  Program Details:   [employer] offers the year-long Diabetes Prevention Program (DPP). The program helps you to make lifestyle changes that prevent or delay type 2 diabetes by supporting healthy eating, increased physical activity, stress reduction and use of coping skills.   On average, previous  [employer]  [facility] cohorts have lost and maintained at least 5% of their starting weight throughout the program and averaged more than 150 minutes of physical activity per week.  Participants meet weekly for one-hour group sessions over sixteen weeks, every other week for the next 8 weeks, and monthly for the last six months.   A year-long maintenance program is also available for participants who complete the first year.   Location & Cost:   During the COVID-19 Public Health Emergency, the program is offered virtually. When in-person classes can resume, they will be held at [facility].  For people with Medicare, the program is covered in full. A self-pay option will also be available for those with non-Medicare insurance plans.   _________________________________________________________________________________________________________________________________________________________  Bluetooth Scale:    We hope to provide you with high quality virtual healthcare visits while social distancing for COVID-19 is necessary, as well as in the future when virtual visits may be more convenient for you.     Our technology team made it possible for Bluetooth scales to send weight measurements to our electronic medical record. This allows weights from you weighing at home to securely flow into the medical record, which will improve telephone and virtual visits.   Additionally, studies have shown that adults actually lose more weight when their weights are automatically sent to someone else, and also that this process is not stressful for those adults.    Below is a link for purchasing the scale, with a discount code for our patients. You may call your insurance company to see if they will reimburse you for the cost of the scale, as a piece of durable medical equipment. The scales only go up to a weight of 400 pounds. This is an issue and we are working with the developer on increasing this. We found  no scales that go over 400lb that have blue-tooth for connecting to Txt4.    Scale to purchase: the ExactFlat  Body  Scale: [email or web address].ds    Discount Code: We have a discount code for our patients to bring the cost down to $50, Discount code is: [place]_Scale_20%off      Thank you,   [facility] Comprehensive Weight Management Team

## 2021-10-06 NOTE — PROGRESS NOTES
Jonathon is a 41 year old who is being evaluated via a billable telephone visit.      What phone number would you like to be contacted at? [phone]  How would you like to obtain your AVS? Juaquin  Phone call duration: 18 Minutes      40 minutes spent on the date of the encounter doing chart review, history and exam, documentation and further activities per the note    Return Medical Weight Management Note     Jonathon Broussard  MRN:  [id number]  :  1980  PO:  10/6/2021    Dear No primary care provider on file.,    I had the pleasure of seeing your patient Jonathon Broussard. She is a 41 year old female who I am continuing to see for treatment of obesity related to:       2020   I have the following health issues associated with obesity: High Blood Pressure, Sleep Apnea, GERD (Reflux), Asthma   I have the following symptoms associated with obesity: None of the above       Assessment & Plan   Problem List Items Addressed This Visit        Digestive    Morbid obesity (H) - Primary     Mood up and down over the last 3 months. Weight fluctuated but mostly weight stable. This is great consider medication changes and mood. Stable on metformin 2000mg and topiramate 75mg twice daily. Patient again would like to increase topiramate to 100mg twice daily but has not discussed with psychiatrist. Concern is that last time she was on that dose she had SI.     MA is starting to cover AOM and patient would benefit from GLP1 given antipsychotic polypharmacy. Will try to get Wegovy approved. Will need help getting started if approved.     Looking for a therapist. I think this will be super beneficial for her.   Discussed how food makes her feel good when she is stressed or anxious or depressed. She feels it is beneficial to talk to someone when she feels like that as well. Discussed options to try before food including talking to staff or talking to brother by phone and taking walk. Discussed food options for when she  "was feeling stressed as well.              Relevant Medications    Semaglutide-Weight Management (WEGOVY) 0.25 MG/0.5ML SOAJ    Semaglutide-Weight Management (WEGOVY) 0.5 MG/0.5ML SOAJ    Other Relevant Orders    Med Therapy Management Referral      Other Visit Diagnoses     Class 2 severe obesity due to excess calories with serious comorbidity and body mass index (BMI) of 38.0 to 38.9 in adult (H)        Relevant Medications    Semaglutide-Weight Management (WEGOVY) 0.25 MG/0.5ML SOAJ    Semaglutide-Weight Management (WEGOVY) 0.5 MG/0.5ML SOAJ    Other Relevant Orders    Med Therapy Management Referral    Weight gain due to medication        Relevant Medications    Semaglutide-Weight Management (WEGOVY) 0.25 MG/0.5ML SOAJ    Semaglutide-Weight Management (WEGOVY) 0.5 MG/0.5ML SOAJ    Other Relevant Orders    Med Therapy Management Referral             INTERVAL HISTORY:  Last seen 7/6/2021 - patient requesting increase in topiramate at that time. She had not discussed it with her psychiatrist. She has hx of SI shortly after topiramate increase in the past. Frustrated with weight. Call abruptly ended when she became frustrated that I couldn't increase topiramate.     Med options limited by insurance and mental health. Already on 2000mg metformin and 75mg BID of topiramate. strattera so phentermine contraindicated.     Today:   Depression improving recently   Weight up and down over the last 4 months. July was 246 otherwise between 255 and 253.   Catatonia issues recently- sometimes all day or part of the day. Can still get up and eat and shower but not much else. Less active since July   Likes to eat - helps her \"feel better\" - looks for \"low fat\" things like twizzlers, pickles, and mandarin oranges     interested in Wegovy. No hx pancreatitis. S/p lap edna. No fam hx medullary thyroid cancer. MA has recently started covering AOM. Due to polypharmacy and psych dx this would be safest option for her.     CURRENT " WEIGHT:   255 lbs 3.2 oz    Initial Weight (lbs): 228 lbs  Last Visits Weight: 110.6 kg (243 lb 14.4 oz)  Cumulative weight loss (lbs): -27.2  Weight Loss Percentage: -11.93%    Changes and Difficulties 10/6/2021   I have made the following changes to my diet since my last visit: yes doing her own meals frozen meals   With regards to my diet, I am still struggling with: yes still feeling hungry want to increase meds   I have made the following changes to my activity/exercise since my last visit: yes doing more walking   With regards to my activity/exercise, I am still struggling with: no         MEDICATIONS:   Current Outpatient Medications   Medication Sig Dispense Refill     ACETAMINOPHEN EXTRA STRENGTH 500 MG tablet TAKE 1 TAB BY MOUTH THREE TIMES DAILY       amLODIPine (NORVASC) 10 MG tablet TAKE 1 TABLET BY MOUTH EVERY DAY DX HIGH BLOOD PRESSURE  11     Ascorbic Acid (VITAMIN C) 100 MG CHEW        atomoxetine (STRATTERA) 100 MG capsule One cap QAM (with one 25mg cap for total of 125mg QAM). Take after food.       atomoxetine (STRATTERA) 25 MG capsule One cap QAM (with one 100mg cap for total of 125mg QAM). Take after food.       azelastine (OPTIVAR) 0.05 % ophthalmic solution INSTILL 1 DROP INTO BOTH EYES TWICE DAILY       benzoyl peroxide 5 % external liquid        benztropine (COGENTIN) 0.5 MG tablet Take 0.5 mg by mouth       Calcium Polycarbophil (FIBER) 625 MG tablet TAKE 2 TABS (1,250mg) BY MOUTH TWICE DAILY       carvedilol (COREG) 12.5 MG tablet TAKE 3 TABLETS (37.5mg) BY MOUTH TWICE DAILY DX HIGH BLOOD PRESSURE  11     cholecalciferol (VITAMIN D-1000 MAX ST) 1000 units TABS Take 1 tablet by mouth every 24 hours       clindamycin (CLEOCIN T) 1 % external lotion        cloZAPine (CLOZARIL) 100 MG tablet 400mg QHS. (4 tabs QHS)       cyclobenzaprine (FLEXERIL) 10 MG tablet Take 1 tablet (10 mg) by mouth 3 times daily 90 tablet 3     diazepam (VALIUM) 10 MG tablet TAKE 1 TAB BY MOUTH THREE TIMES DAILY.IF  TOO SEDATING ,CAN TAKE 1 2 TAB (5MG)  4     diphenhydrAMINE HCl 50 MG TABS One tab QID prn.       fluticasone (FLOVENT HFA) 110 MCG/ACT inhaler Inhale 110 mcg into the lungs       GABAPENTIN PO Take 1,200 mg by mouth 2 times daily Two tablets twice daily       hydrocortisone (CORTAID) 1 % external cream        ketoconazole (NIZORAL) 2 % external cream        LACTASE ENZYME 3000 units tablet TAKE 1 TAB BY MOUTH THREE TIMES DAILY AS NEEDED WITH FIRST BITE OF DAIRY CONTAINING FOOD       LANsoprazole (PREVACID) 30 MG DR capsule TAKE 1 CAP BY MOUTH ONCE DAILY 30 MINS PRIOR TO FIRST MEAL OF THE DAY 90 capsule 3     levothyroxine (SYNTHROID/LEVOTHROID) 88 MCG tablet TAKE 1 TAB BY MOUTH ONCE DAILY ON AN EMPTY STOMACH AND 30 MINUTES PRIOR TO OTHER MEDICATIONS       LEVOTHYROXINE SODIUM PO Take 112 mcg by mouth       lisinopril (PRINIVIL/ZESTRIL) 5 MG tablet TAKE 1 TAB BY MOUTH ONCE DAILY  11     lithium (ESKALITH) 600 MG capsule Take 1,200 mg by mouth daily        LORazepam (ATIVAN) 2 MG tablet TAKE 1 TAB BY MOUTH THREE TIMES DAILY  4     Lubricants (SURGICAL LUBRICANT) external gel        LUBRICATING PLUS EYE DROPS 0.5 % SOLN ophthalmic solution INSTILL 1 DROP INTO EACH EYE FOUR TIMES DAILY  11     magnesium citrate 1.745 GM/30ML solution Take 296 mLs by mouth once       melatonin 5 MG tablet Take 5 mg by mouth       metFORMIN (GLUCOPHAGE-XR) 500 MG 24 hr tablet Take 2 tablets (1,000 mg) by mouth 2 times daily (with meals) 120 tablet 3     Multiple Vitamin (DAILY-KONSTANTIN) TABS Take 1 tablet by mouth At Bedtime       Multiple Vitamins-Minerals (CERTA-KONSTANTIN PO)        MYRBETRIQ 25 MG 24 hr tablet TAKE 1 TAB BY MOUTH ONCE DAILY       nystatin-triamcinolone (MYCOLOG) 334442-9.1 UNIT/GM-% external ointment APPLY TO AFFECTED AREA(S) ON LABIA TWICE DAILY FOR ONE WEEK FOR CANDIDA       olopatadine (PATANOL) 0.1 % ophthalmic solution INSTILL 1 DROP TO BOTH EYES TWICE A DAY  5     oxybutynin (DITROPAN-XL) 10 MG 24 hr tablet TAKE 1 TAB BY  MOUTH ONCE DAILY  99     Paliperidone (INVEGA PO) Take 9 mg by mouth       paliperidone (INVEGA SUSTENNA) 234 MG/1.5ML SUSP Inject 234 mg into the muscle       PARoxetine (PAXIL) 20 MG tablet TAKE 1/2 TAB (10MG) BY MOUTH DAILY FOR 1 WEEK TAKE WITH FOOD;TAKE 1 TAB BY MOUTH DAILY TAKE WITH FOOD       polyethylene glycol (MIRALAX) powder Take 17 g (1 capful) by mouth daily 510 g 11     Semaglutide-Weight Management (WEGOVY) 0.25 MG/0.5ML SOAJ Inject 0.25 mg Subcutaneous every 7 days For 4 weeks, then 0.5mg dose 0.5 mL 0     Semaglutide-Weight Management (WEGOVY) 0.5 MG/0.5ML SOAJ Inject 0.5 mg Subcutaneous every 7 days - start after 0.25mg x 4 weeks 0.5 mL 1     simethicone (MYLICON) 125 MG chewable tablet Take 125 mg by mouth 2 times daily       SM MICONAZOLE 7 2 % cream INSERT 1 APPLICATORFUL VAGINALLY AT BEDTIME FOR 7 DAYS       SM NICOTINE POLACRILEX 2 MG gum CHEW 1 PIECE EVERY HOUR AS NEEDED FOR NICOTINE CRAVINGS  16     topiramate (TOPAMAX) 25 MG tablet Take 3 tablets (75 mg) by mouth 2 times daily 180 tablet 2     triamcinolone (KENALOG) 0.025 % cream APPLY TWICE DAILY TO RASH ON WRIST AND UNDER ARMPITS  1     triamcinolone (KENALOG) 0.1 % external cream APPLY TOPICALLY TO BOTH HANDS TWICE DAILY AS NEEDED FOR 2 WEEKS AT A TIME       valACYclovir (VALTREX) 500 MG tablet TAKE 1 TAB BY MOUTH DAILY       VENLAFAXINE HCL PO Take 375 mg by mouth 2 times daily ER       VITAMIN D, CHOLECALCIFEROL, PO Take 2,000 Units by mouth daily        lurasidone (LATUDA) 40 MG TABS tablet Take 120 mg by mouth daily with food          Weight Loss Medication History Reviewed With Patient 10/6/2021   Which weight loss medications are you currently taking on a regular basis?  Topamax (topiramate)   Are you having any side effects from the weight loss medication that we have prescribed you? No   If you are having side effects please describe: none         PHYSICAL EXAM:  Objective      Vitals - Patient Reported  Pain Score: No Pain (0)  "   1.613 m (5' 3.5\")   Wt 115.8 kg (255 lb 3.2 oz)   BMI 44.50 kg/m           GENERAL: Healthy, alert and no distress  EYES: Eyes grossly normal to inspection.  No discharge or erythema, or obvious scleral/conjunctival abnormalities.  RESP: No audible wheeze, cough, or visible cyanosis.  No visible retractions or increased work of breathing.    SKIN: Visible skin clear. No significant rash, abnormal pigmentation or lesions.  NEURO: Cranial nerves grossly intact.  Mentation and speech appropriate for age.  PSYCH: Mentation appears normal, affect normal/bright, judgement and insight intact, normal speech and appearance well-groomed.        Sincerely,    Jessica Contreras NP  "

## 2021-10-07 ENCOUNTER — TELEPHONE (OUTPATIENT)
Dept: ENDOCRINOLOGY | Facility: CLINIC | Age: 41
End: 2021-10-07
Payer: COMMERCIAL

## 2021-10-07 NOTE — TELEPHONE ENCOUNTER
Reason for call:  Other   Patient called regarding (reason for call): prescription  Additional comments: Nurse Dariusz called to notify that patient will need a prior authorization for Wegovy prescribed by Jessica yesterday. He called from [facility], but the prior authorization needs to go to [facility].     Phone number to reach patient:  Other phone number:  [phone] (direct line to Nurse Arzola for any questions/updates)*    Best Time:  anytime     Can we leave a detailed message on this number?  Not Applicable    Travel screening: Not Applicable

## 2021-10-07 NOTE — TELEPHONE ENCOUNTER
Central Prior Authorization Team   Phone: [phone]      PA Initiation    Medication: Wegovy-PA initiated  Insurance Company: [employer] Part D - Phone [phone] Fax [phone]  Pharmacy Filling the Rx: [id number] - [place], [place] - [address] [facility]  Filling Pharmacy Phone: [phone]  Filling Pharmacy Fax:    Start Date: 10/7/2021

## 2021-10-08 ENCOUNTER — TELEPHONE (OUTPATIENT)
Dept: ENDOCRINOLOGY | Facility: CLINIC | Age: 41
End: 2021-10-08

## 2021-10-08 NOTE — TELEPHONE ENCOUNTER
PRIOR AUTHORIZATION DENIED    Medication: Wegovy-PA denied    Denial Date: 10/7/2021    Denial Rational: This is a Medicare patient and weight loss is excluded under Medicare Law

## 2021-10-08 NOTE — TELEPHONE ENCOUNTER
MTM referral from: [facility] visit (referral by provider)    MTM referral outreach attempt #2 on October 8, 2021 at 1:07 PM      Outcome: Patient is not interested at this time because she said she has no information on the injection or when to start and would like for some to call to call to explain this first, will route to MTM Pharmacist/Provider as an FYI. Thank you for the referral.     Jalyn Royal, [facility]

## 2021-10-15 ENCOUNTER — TELEPHONE (OUTPATIENT)
Dept: ENDOCRINOLOGY | Facility: CLINIC | Age: 41
End: 2021-10-15
Payer: COMMERCIAL

## 2021-11-08 ENCOUNTER — OFFICE VISIT (OUTPATIENT)
Dept: ORTHOPEDICS | Facility: CLINIC | Age: 41
End: 2021-11-08
Payer: COMMERCIAL

## 2021-11-08 VITALS — WEIGHT: 278 LBS | HEIGHT: 64 IN | BODY MASS INDEX: 47.46 KG/M2

## 2021-11-08 DIAGNOSIS — M48.061 DEGENERATIVE LUMBAR SPINAL STENOSIS: Primary | ICD-10-CM

## 2021-11-08 DIAGNOSIS — E66.01 MORBID OBESITY (H): ICD-10-CM

## 2021-11-08 PROCEDURE — 99213 OFFICE O/P EST LOW 20 MIN: CPT | Performed by: FAMILY MEDICINE

## 2021-11-08 ASSESSMENT — MIFFLIN-ST. JEOR: SCORE: 1911

## 2021-11-08 NOTE — LETTER
11/8/2021      RE: Jonathon Broussard  [address]  [address]  [facility] [address]       ASSESSMENT/PLAN:    (M48.061) Degenerative lumbar spinal stenosis  (primary encounter diagnosis)  Comment: exam concerning for stenosis given extension bias and reported weakness in legs; she has failed PT so will proceed w/ MRI as she would consider injections; f/u after scan; she will hold on seeing chiropractor  Plan: MRI Lumbar spine w/o contrast          (E66.01) Morbid obesity (H)  Comment:   Plan: we reviewed that her weight has gone up significantly over the past yr and this will only exacerbate her back pain; she feels that she is on a diet but cannot exercise due to her back; mood is a big factor as well as she knows. Will discuss further at her MRI follow-up    Jarad Quintero MD  November 8, 2021  10:10 AM        Pt is a 41 year old female last seen on 7/12/2021 here for follow up of:     Back pain:   Location: right sided lumbar   Duration: Chronic, worsening pain since 10/8/21 without injury. This was after the first day she saw a chiropractor, which she continues to see. Stopped PT over the summer; has been doing exercises daily which are not helping   Radiation: None   Numbness/ Tingling? None   Weakness? Yes, in both legs -> w/ prolonged walking   Flexion or Extension bias: extension  Red flags? Difficulties doing ADL's   Meds tried? Flexeril, requesting refill - was unaware she had a refill waiting which I sent in October   PT? Previous, but not currently attending.   Imaging? 6/1/2021  FINDINGS: AP and lateral views of the lumbar spine were obtained.  There are 5 lumbar type vertebral bodies for the purposes of this  dictation. The lumbar vertebral bodies are well aligned and well  maintained without evidence of compression fracture. Mild disc space  narrowing in the lumbar spine.                                                                    IMPRESSION: No acute bone abnormality in the lumbar spine.        Morbid obesity - up over 20lbs since the summer     Wt Readings from Last 3 Encounters:   11/08/21 126.1 kg (278 lb)   10/06/21 115.8 kg (255 lb 3.2 oz)   07/12/21 114.8 kg (253 lb)       Per my last note:     (M54.5,  G89.29) Chronic bilateral low back pain without sciatica  (primary encounter diagnosis)  Comment: will cont PT; add chiropractic assessment/ tx; refilled flexeril; reviewed red flags; f/u prn  Plan: Chiropractic Referral, cyclobenzaprine (FLEXERIL) 10 MG tablet          (M54.6,  G89.29) Chronic midline thoracic back pain  Comment: see above  Plan: Chiropractic Referral, cyclobenzaprine (FLEXERIL) 10 MG tablet        Past Medical History:   Diagnosis Date     ADHD (attention deficit hyperactivity disorder)      Bipolar I disorder, most recent episode (or current) unspecified '98    initially dx as depression, then bipolar     Calculus of gallbladder without mention of cholecystitis or obstruction 5/2015     Cannabis dependence, in remission (H) 3/04    treatment x2, last use 6/06     Fibromyalgia      Gastro-oesophageal reflux disease      HTN (hypertension)      Juvenile idiopathic arthritis (H)      OCD (obsessive compulsive disorder)      Snores      Thyroid disease     hypothyroidism      Current Outpatient Medications   Medication Sig Dispense Refill     ACETAMINOPHEN EXTRA STRENGTH 500 MG tablet TAKE 1 TAB BY MOUTH THREE TIMES DAILY       amLODIPine (NORVASC) 10 MG tablet TAKE 1 TABLET BY MOUTH EVERY DAY DX HIGH BLOOD PRESSURE  11     Ascorbic Acid (VITAMIN C) 100 MG CHEW        atomoxetine (STRATTERA) 100 MG capsule One cap QAM (with one 25mg cap for total of 125mg QAM). Take after food.       atomoxetine (STRATTERA) 25 MG capsule One cap QAM (with one 100mg cap for total of 125mg QAM). Take after food.       azelastine (OPTIVAR) 0.05 % ophthalmic solution INSTILL 1 DROP INTO BOTH EYES TWICE DAILY       benzoyl peroxide 5 % external liquid        benztropine (COGENTIN) 0.5 MG tablet Take  0.5 mg by mouth       Calcium Polycarbophil (FIBER) 625 MG tablet TAKE 2 TABS (1,250mg) BY MOUTH TWICE DAILY       carvedilol (COREG) 12.5 MG tablet TAKE 3 TABLETS (37.5mg) BY MOUTH TWICE DAILY DX HIGH BLOOD PRESSURE  11     cholecalciferol (VITAMIN D-1000 MAX ST) 1000 units TABS Take 1 tablet by mouth every 24 hours       clindamycin (CLEOCIN T) 1 % external lotion        cloZAPine (CLOZARIL) 100 MG tablet 400mg QHS. (4 tabs QHS)       cyclobenzaprine (FLEXERIL) 10 MG tablet Take 1 tablet (10 mg) by mouth 3 times daily 90 tablet 3     diazepam (VALIUM) 10 MG tablet TAKE 1 TAB BY MOUTH THREE TIMES DAILY.IF TOO SEDATING ,CAN TAKE 1 2 TAB (5MG)  4     diphenhydrAMINE HCl 50 MG TABS One tab QID prn.       fluticasone (FLOVENT HFA) 110 MCG/ACT inhaler Inhale 110 mcg into the lungs       GABAPENTIN PO Take 1,200 mg by mouth 2 times daily Two tablets twice daily       hydrocortisone (CORTAID) 1 % external cream        ketoconazole (NIZORAL) 2 % external cream        LACTASE ENZYME 3000 units tablet TAKE 1 TAB BY MOUTH THREE TIMES DAILY AS NEEDED WITH FIRST BITE OF DAIRY CONTAINING FOOD       LANsoprazole (PREVACID) 30 MG DR capsule TAKE 1 CAP BY MOUTH ONCE DAILY 30 MINS PRIOR TO FIRST MEAL OF THE DAY 90 capsule 3     levothyroxine (SYNTHROID/LEVOTHROID) 88 MCG tablet TAKE 1 TAB BY MOUTH ONCE DAILY ON AN EMPTY STOMACH AND 30 MINUTES PRIOR TO OTHER MEDICATIONS       LEVOTHYROXINE SODIUM PO Take 112 mcg by mouth       lisinopril (PRINIVIL/ZESTRIL) 5 MG tablet TAKE 1 TAB BY MOUTH ONCE DAILY  11     lithium (ESKALITH) 600 MG capsule Take 1,200 mg by mouth daily        LORazepam (ATIVAN) 2 MG tablet TAKE 1 TAB BY MOUTH THREE TIMES DAILY  4     Lubricants (SURGICAL LUBRICANT) external gel        LUBRICATING PLUS EYE DROPS 0.5 % SOLN ophthalmic solution INSTILL 1 DROP INTO EACH EYE FOUR TIMES DAILY  11     lurasidone (LATUDA) 40 MG TABS tablet Take 120 mg by mouth daily with food        magnesium citrate 1.745 GM/30ML solution  Take 296 mLs by mouth once       melatonin 5 MG tablet Take 5 mg by mouth       metFORMIN (GLUCOPHAGE-XR) 500 MG 24 hr tablet Take 2 tablets (1,000 mg) by mouth 2 times daily (with meals) 120 tablet 3     Multiple Vitamin (DAILY-KONSTANTIN) TABS Take 1 tablet by mouth At Bedtime       Multiple Vitamins-Minerals (CERTA-KONSTANTIN PO)        MYRBETRIQ 25 MG 24 hr tablet TAKE 1 TAB BY MOUTH ONCE DAILY       nystatin-triamcinolone (MYCOLOG) 329681-4.1 UNIT/GM-% external ointment APPLY TO AFFECTED AREA(S) ON LABIA TWICE DAILY FOR ONE WEEK FOR CANDIDA       olopatadine (PATANOL) 0.1 % ophthalmic solution INSTILL 1 DROP TO BOTH EYES TWICE A DAY  5     oxybutynin (DITROPAN-XL) 10 MG 24 hr tablet TAKE 1 TAB BY MOUTH ONCE DAILY  99     Paliperidone (INVEGA PO) Take 9 mg by mouth       paliperidone (INVEGA SUSTENNA) 234 MG/1.5ML SUSP Inject 234 mg into the muscle       PARoxetine (PAXIL) 20 MG tablet TAKE 1/2 TAB (10MG) BY MOUTH DAILY FOR 1 WEEK TAKE WITH FOOD;TAKE 1 TAB BY MOUTH DAILY TAKE WITH FOOD       polyethylene glycol (MIRALAX) powder Take 17 g (1 capful) by mouth daily 510 g 11     Semaglutide-Weight Management (WEGOVY) 0.25 MG/0.5ML SOAJ Inject 0.25 mg Subcutaneous every 7 days For 4 weeks, then 0.5mg dose 0.5 mL 0     Semaglutide-Weight Management (WEGOVY) 0.5 MG/0.5ML SOAJ Inject 0.5 mg Subcutaneous every 7 days - start after 0.25mg x 4 weeks 0.5 mL 1     simethicone (MYLICON) 125 MG chewable tablet Take 125 mg by mouth 2 times daily       SM MICONAZOLE 7 2 % cream INSERT 1 APPLICATORFUL VAGINALLY AT BEDTIME FOR 7 DAYS       SM NICOTINE POLACRILEX 2 MG gum CHEW 1 PIECE EVERY HOUR AS NEEDED FOR NICOTINE CRAVINGS  16     topiramate (TOPAMAX) 25 MG tablet Take 3 tablets (75 mg) by mouth 2 times daily 180 tablet 2     triamcinolone (KENALOG) 0.025 % cream APPLY TWICE DAILY TO RASH ON WRIST AND UNDER ARMPITS  1     triamcinolone (KENALOG) 0.1 % external cream APPLY TOPICALLY TO BOTH HANDS TWICE DAILY AS NEEDED FOR 2 WEEKS AT A TIME  "      valACYclovir (VALTREX) 500 MG tablet TAKE 1 TAB BY MOUTH DAILY       VENLAFAXINE HCL PO Take 375 mg by mouth 2 times daily ER       VITAMIN D, CHOLECALCIFEROL, PO Take 2,000 Units by mouth daily         Allergies   Allergen Reactions     Haldol [Haloperidol] Tinnitus     Zyprexa [Olanzapine] Visual Disturbance and Other (See Comments)     akathesia     Seasonal Allergies       ROS:   Gen- no fevers/chills   Rheum - no morning stiffness   Derm - no rash/ redness   Neuro - see HPI    Remainder of ROS negative.     Exam:   Ht 1.626 m (5' 4\")   Wt 126.1 kg (278 lb)   BMI 47.72 kg/m        BACK:   ROM: flexion -full /extension -5 /lateral rotation- full /side bend- full; pain worst w/ extension and side bend  Bony tenderness: midline lower   Paraspinal tenderness: Bilateral  Sensation: intact in b/l lower extremities.   Strength: 5/5 w/ dorsiflexion/ plantarflexion/ inversion/ eversion/ knee flexion/ extension.   Maneuvers: Neg straight leg raise b/l. Neg Slump b/l Neg JIMMY   Neuro: DTR's 2+. Neg Clonus; mild unsteadiness w/ heel walking        Jarad Quintero MD    "

## 2021-11-08 NOTE — PROGRESS NOTES
ASSESSMENT/PLAN:    (M48.061) Degenerative lumbar spinal stenosis  (primary encounter diagnosis)  Comment: exam concerning for stenosis given extension bias and reported weakness in legs; she has failed PT so will proceed w/ MRI as she would consider injections; f/u after scan; she will hold on seeing chiropractor  Plan: MRI Lumbar spine w/o contrast          (E66.01) Morbid obesity (H)  Comment:   Plan: we reviewed that her weight has gone up significantly over the past yr and this will only exacerbate her back pain; she feels that she is on a diet but cannot exercise due to her back; mood is a big factor as well as she knows. Will discuss further at her MRI follow-up    aJrad Quintero MD  November 8, 2021  10:10 AM        Pt is a 41 year old female last seen on 7/12/2021 here for follow up of:     Back pain:   Location: right sided lumbar   Duration: Chronic, worsening pain since 10/8/21 without injury. This was after the first day she saw a chiropractor, which she continues to see. Stopped PT over the summer; has been doing exercises daily which are not helping   Radiation: None   Numbness/ Tingling? None   Weakness? Yes, in both legs -> w/ prolonged walking   Flexion or Extension bias: extension  Red flags? Difficulties doing ADL's   Meds tried? Flexeril, requesting refill - was unaware she had a refill waiting which I sent in October   PT? Previous, but not currently attending.   Imaging? 6/1/2021  FINDINGS: AP and lateral views of the lumbar spine were obtained.  There are 5 lumbar type vertebral bodies for the purposes of this  dictation. The lumbar vertebral bodies are well aligned and well  maintained without evidence of compression fracture. Mild disc space  narrowing in the lumbar spine.                                                                    IMPRESSION: No acute bone abnormality in the lumbar spine.       Morbid obesity - up over 20lbs since the summer     Wt Readings from Last 3 Encounters:    11/08/21 126.1 kg (278 lb)   10/06/21 115.8 kg (255 lb 3.2 oz)   07/12/21 114.8 kg (253 lb)       Per my last note:     (M54.5,  G89.29) Chronic bilateral low back pain without sciatica  (primary encounter diagnosis)  Comment: will cont PT; add chiropractic assessment/ tx; refilled flexeril; reviewed red flags; f/u prn  Plan: Chiropractic Referral, cyclobenzaprine (FLEXERIL) 10 MG tablet          (M54.6,  G89.29) Chronic midline thoracic back pain  Comment: see above  Plan: Chiropractic Referral, cyclobenzaprine (FLEXERIL) 10 MG tablet        Past Medical History:   Diagnosis Date     ADHD (attention deficit hyperactivity disorder)      Bipolar I disorder, most recent episode (or current) unspecified '98    initially dx as depression, then bipolar     Calculus of gallbladder without mention of cholecystitis or obstruction 5/2015     Cannabis dependence, in remission (H) 3/04    treatment x2, last use 6/06     Fibromyalgia      Gastro-oesophageal reflux disease      HTN (hypertension)      Juvenile idiopathic arthritis (H)      OCD (obsessive compulsive disorder)      Snores      Thyroid disease     hypothyroidism      Current Outpatient Medications   Medication Sig Dispense Refill     ACETAMINOPHEN EXTRA STRENGTH 500 MG tablet TAKE 1 TAB BY MOUTH THREE TIMES DAILY       amLODIPine (NORVASC) 10 MG tablet TAKE 1 TABLET BY MOUTH EVERY DAY DX HIGH BLOOD PRESSURE  11     Ascorbic Acid (VITAMIN C) 100 MG CHEW        atomoxetine (STRATTERA) 100 MG capsule One cap QAM (with one 25mg cap for total of 125mg QAM). Take after food.       atomoxetine (STRATTERA) 25 MG capsule One cap QAM (with one 100mg cap for total of 125mg QAM). Take after food.       azelastine (OPTIVAR) 0.05 % ophthalmic solution INSTILL 1 DROP INTO BOTH EYES TWICE DAILY       benzoyl peroxide 5 % external liquid        benztropine (COGENTIN) 0.5 MG tablet Take 0.5 mg by mouth       Calcium Polycarbophil (FIBER) 625 MG tablet TAKE 2 TABS (1,250mg) BY  MOUTH TWICE DAILY       carvedilol (COREG) 12.5 MG tablet TAKE 3 TABLETS (37.5mg) BY MOUTH TWICE DAILY DX HIGH BLOOD PRESSURE  11     cholecalciferol (VITAMIN D-1000 MAX ST) 1000 units TABS Take 1 tablet by mouth every 24 hours       clindamycin (CLEOCIN T) 1 % external lotion        cloZAPine (CLOZARIL) 100 MG tablet 400mg QHS. (4 tabs QHS)       cyclobenzaprine (FLEXERIL) 10 MG tablet Take 1 tablet (10 mg) by mouth 3 times daily 90 tablet 3     diazepam (VALIUM) 10 MG tablet TAKE 1 TAB BY MOUTH THREE TIMES DAILY.IF TOO SEDATING ,CAN TAKE 1 2 TAB (5MG)  4     diphenhydrAMINE HCl 50 MG TABS One tab QID prn.       fluticasone (FLOVENT HFA) 110 MCG/ACT inhaler Inhale 110 mcg into the lungs       GABAPENTIN PO Take 1,200 mg by mouth 2 times daily Two tablets twice daily       hydrocortisone (CORTAID) 1 % external cream        ketoconazole (NIZORAL) 2 % external cream        LACTASE ENZYME 3000 units tablet TAKE 1 TAB BY MOUTH THREE TIMES DAILY AS NEEDED WITH FIRST BITE OF DAIRY CONTAINING FOOD       LANsoprazole (PREVACID) 30 MG DR capsule TAKE 1 CAP BY MOUTH ONCE DAILY 30 MINS PRIOR TO FIRST MEAL OF THE DAY 90 capsule 3     levothyroxine (SYNTHROID/LEVOTHROID) 88 MCG tablet TAKE 1 TAB BY MOUTH ONCE DAILY ON AN EMPTY STOMACH AND 30 MINUTES PRIOR TO OTHER MEDICATIONS       LEVOTHYROXINE SODIUM PO Take 112 mcg by mouth       lisinopril (PRINIVIL/ZESTRIL) 5 MG tablet TAKE 1 TAB BY MOUTH ONCE DAILY  11     lithium (ESKALITH) 600 MG capsule Take 1,200 mg by mouth daily        LORazepam (ATIVAN) 2 MG tablet TAKE 1 TAB BY MOUTH THREE TIMES DAILY  4     Lubricants (SURGICAL LUBRICANT) external gel        LUBRICATING PLUS EYE DROPS 0.5 % SOLN ophthalmic solution INSTILL 1 DROP INTO EACH EYE FOUR TIMES DAILY  11     lurasidone (LATUDA) 40 MG TABS tablet Take 120 mg by mouth daily with food        magnesium citrate 1.745 GM/30ML solution Take 296 mLs by mouth once       melatonin 5 MG tablet Take 5 mg by mouth       metFORMIN  (GLUCOPHAGE-XR) 500 MG 24 hr tablet Take 2 tablets (1,000 mg) by mouth 2 times daily (with meals) 120 tablet 3     Multiple Vitamin (DAILY-KONSTANTIN) TABS Take 1 tablet by mouth At Bedtime       Multiple Vitamins-Minerals (CERTA-KONSTANTIN PO)        MYRBETRIQ 25 MG 24 hr tablet TAKE 1 TAB BY MOUTH ONCE DAILY       nystatin-triamcinolone (MYCOLOG) 406492-1.1 UNIT/GM-% external ointment APPLY TO AFFECTED AREA(S) ON LABIA TWICE DAILY FOR ONE WEEK FOR CANDIDA       olopatadine (PATANOL) 0.1 % ophthalmic solution INSTILL 1 DROP TO BOTH EYES TWICE A DAY  5     oxybutynin (DITROPAN-XL) 10 MG 24 hr tablet TAKE 1 TAB BY MOUTH ONCE DAILY  99     Paliperidone (INVEGA PO) Take 9 mg by mouth       paliperidone (INVEGA SUSTENNA) 234 MG/1.5ML SUSP Inject 234 mg into the muscle       PARoxetine (PAXIL) 20 MG tablet TAKE 1/2 TAB (10MG) BY MOUTH DAILY FOR 1 WEEK TAKE WITH FOOD;TAKE 1 TAB BY MOUTH DAILY TAKE WITH FOOD       polyethylene glycol (MIRALAX) powder Take 17 g (1 capful) by mouth daily 510 g 11     Semaglutide-Weight Management (WEGOVY) 0.25 MG/0.5ML SOAJ Inject 0.25 mg Subcutaneous every 7 days For 4 weeks, then 0.5mg dose 0.5 mL 0     Semaglutide-Weight Management (WEGOVY) 0.5 MG/0.5ML SOAJ Inject 0.5 mg Subcutaneous every 7 days - start after 0.25mg x 4 weeks 0.5 mL 1     simethicone (MYLICON) 125 MG chewable tablet Take 125 mg by mouth 2 times daily       SM MICONAZOLE 7 2 % cream INSERT 1 APPLICATORFUL VAGINALLY AT BEDTIME FOR 7 DAYS       SM NICOTINE POLACRILEX 2 MG gum CHEW 1 PIECE EVERY HOUR AS NEEDED FOR NICOTINE CRAVINGS  16     topiramate (TOPAMAX) 25 MG tablet Take 3 tablets (75 mg) by mouth 2 times daily 180 tablet 2     triamcinolone (KENALOG) 0.025 % cream APPLY TWICE DAILY TO RASH ON WRIST AND UNDER ARMPITS  1     triamcinolone (KENALOG) 0.1 % external cream APPLY TOPICALLY TO BOTH HANDS TWICE DAILY AS NEEDED FOR 2 WEEKS AT A TIME       valACYclovir (VALTREX) 500 MG tablet TAKE 1 TAB BY MOUTH DAILY       VENLAFAXINE  "HCL PO Take 375 mg by mouth 2 times daily ER       VITAMIN D, CHOLECALCIFEROL, PO Take 2,000 Units by mouth daily         Allergies   Allergen Reactions     Haldol [Haloperidol] Tinnitus     Zyprexa [Olanzapine] Visual Disturbance and Other (See Comments)     akathesia     Seasonal Allergies       ROS:   Gen- no fevers/chills   Rheum - no morning stiffness   Derm - no rash/ redness   Neuro - see HPI    Remainder of ROS negative.     Exam:   Ht 1.626 m (5' 4\")   Wt 126.1 kg (278 lb)   BMI 47.72 kg/m        BACK:   ROM: flexion -full /extension -5 /lateral rotation- full /side bend- full; pain worst w/ extension and side bend  Bony tenderness: midline lower   Paraspinal tenderness: Bilateral  Sensation: intact in b/l lower extremities.   Strength: 5/5 w/ dorsiflexion/ plantarflexion/ inversion/ eversion/ knee flexion/ extension.   Maneuvers: Neg straight leg raise b/l. Neg Slump b/l Neg JIMMY   Neuro: DTR's 2+. Neg Clonus; mild unsteadiness w/ heel walking      "

## 2021-11-09 NOTE — TELEPHONE ENCOUNTER
Called patient to discuss Wegovy denial. Left message and will call patient tomorrow.    Cait Bhakta, EMT

## 2021-11-09 NOTE — TELEPHONE ENCOUNTER
Patient calling to find out the status of Wegovy. Please contact patient @ [phone]. Ok to leave a detailed message.

## 2021-11-10 NOTE — TELEPHONE ENCOUNTER
Let patient know that she needs to stay on topiramate and metformin as there is no other option for weight loss medications.    Cait Bhakta, EMT

## 2021-11-10 NOTE — TELEPHONE ENCOUNTER
Called patient to tell her about the Wegovy denial and to stay on topiramate and metformin. Patient asked if there was another drug she could take in order to suppress her appetite. She states the topiramate is not helping with hunger suppression. I will reach out to Jessica Contreras, ALEKSANDR-C to see if there are any other options.    Cait Bhakta, EMT

## 2021-11-11 DIAGNOSIS — E66.01 MORBID OBESITY (H): ICD-10-CM

## 2021-11-11 RX ORDER — TOPIRAMATE 25 MG/1
75 TABLET, FILM COATED ORAL 2 TIMES DAILY
Qty: 180 TABLET | Refills: 3 | Status: SHIPPED | OUTPATIENT
Start: 2021-11-11 | End: 2022-01-06

## 2021-11-11 NOTE — TELEPHONE ENCOUNTER
topiramate (TOPAMAX) 25 MG tablet  Last Written Prescription Date:  7/7/2021  Last Fill Quantity: 180,   # refills: 2  Last Office Visit :  10/6/2021  Future Office visit:  1/6/2022  180 Tabs, 3 Refills sent to pharm 11/11/2021      Sury Navarrete RN  Central Triage Red Flags/Med Refills

## 2021-11-11 NOTE — TELEPHONE ENCOUNTER
RN called and wanted to let the clinic know that pt runs out of the requested medication on Sunday and they need it by 11 am tomorrow to get it to the pt.

## 2021-11-12 ENCOUNTER — TELEPHONE (OUTPATIENT)
Dept: ENDOCRINOLOGY | Facility: CLINIC | Age: 41
End: 2021-11-12
Payer: COMMERCIAL

## 2021-11-12 DIAGNOSIS — E66.01 MORBID OBESITY (H): ICD-10-CM

## 2021-11-12 DIAGNOSIS — T50.905A WEIGHT GAIN DUE TO MEDICATION: ICD-10-CM

## 2021-11-12 DIAGNOSIS — R63.5 WEIGHT GAIN DUE TO MEDICATION: ICD-10-CM

## 2021-11-12 RX ORDER — METFORMIN HCL 500 MG
1000 TABLET, EXTENDED RELEASE 24 HR ORAL 2 TIMES DAILY WITH MEALS
Qty: 120 TABLET | Refills: 2 | Status: SHIPPED | OUTPATIENT
Start: 2021-11-12 | End: 2022-01-06

## 2021-11-12 NOTE — TELEPHONE ENCOUNTER
Centralized Medication Refill Team note:  metFORMIN (GLUCOPHAGE-XR) 500 MG 24 hr tablet  Take 2 tablets (1,000 mg) by mouth 2 times daily (with meals)   Last Written Prescription Date:  7/7/2021  Last Fill Quantity: 120,   # refills: 3  Last Office Visit : 10/6/21  Future Office visit:  1/6/2022  Refilled per protocol.  RECENT OUTSIDE LABS AVAILABLE IN THE LAB TAB OR CARE EVERYWHERE.  3/29/21 [facility] [place] [facility], LFT  1/8/2021 [facility]  1/9/2021 [facility] A1c=5.4

## 2021-11-16 ENCOUNTER — ANCILLARY PROCEDURE (OUTPATIENT)
Dept: MRI IMAGING | Facility: CLINIC | Age: 41
End: 2021-11-16
Attending: FAMILY MEDICINE
Payer: COMMERCIAL

## 2021-11-16 DIAGNOSIS — M48.061 DEGENERATIVE LUMBAR SPINAL STENOSIS: ICD-10-CM

## 2021-11-16 PROCEDURE — 72148 MRI LUMBAR SPINE W/O DYE: CPT | Performed by: RADIOLOGY

## 2021-11-19 ENCOUNTER — TELEPHONE (OUTPATIENT)
Dept: ORTHOPEDICS | Facility: CLINIC | Age: 41
End: 2021-11-19
Payer: COMMERCIAL

## 2021-11-19 NOTE — TELEPHONE ENCOUNTER
[facility] Call Center    Phone Message    May a detailed message be left on voicemail: yes     Reason for Call: Other: patient would like to talk to Dr Quintero or team because she is getting injection in her lower back from the [facility] and she is wondering if she needs to come in and see Dr Quintero for that or if he can just fax the order.      Action Taken: Message routed to:  [facility] ([facility]): sports med    Travel Screening: Not Applicable

## 2021-11-22 NOTE — TELEPHONE ENCOUNTER
Spoke to pt and explained that her lumbar MRI was essentially normal and she would not need any injections. She was pleased and will continue her home exercise program. She declined formal PT. Will f/u prn.    Jarad Quintero MD  November 22, 2021  3:34 PM

## 2022-01-06 ENCOUNTER — VIRTUAL VISIT (OUTPATIENT)
Dept: ENDOCRINOLOGY | Facility: CLINIC | Age: 42
End: 2022-01-06
Payer: COMMERCIAL

## 2022-01-06 VITALS — WEIGHT: 258.4 LBS | HEIGHT: 63 IN | BODY MASS INDEX: 45.79 KG/M2

## 2022-01-06 DIAGNOSIS — E66.01 MORBID OBESITY (H): ICD-10-CM

## 2022-01-06 DIAGNOSIS — R63.5 WEIGHT GAIN DUE TO MEDICATION: ICD-10-CM

## 2022-01-06 DIAGNOSIS — T50.905A WEIGHT GAIN DUE TO MEDICATION: ICD-10-CM

## 2022-01-06 PROCEDURE — 99213 OFFICE O/P EST LOW 20 MIN: CPT | Mod: TEL | Performed by: NURSE PRACTITIONER

## 2022-01-06 RX ORDER — LEVOTHYROXINE SODIUM 100 UG/1
TABLET ORAL
COMMUNITY
Start: 2021-12-28

## 2022-01-06 RX ORDER — TOPIRAMATE 25 MG/1
75 TABLET, FILM COATED ORAL 2 TIMES DAILY
Qty: 180 TABLET | Refills: 3 | Status: SHIPPED | OUTPATIENT
Start: 2022-01-06 | End: 2022-06-09

## 2022-01-06 RX ORDER — ESCITALOPRAM OXALATE 20 MG/1
TABLET ORAL
COMMUNITY
Start: 2021-11-03

## 2022-01-06 RX ORDER — MULTIVIT-MIN/FOLIC ACID/BIOTIN 400-400MCG
1 CAPSULE ORAL DAILY
COMMUNITY

## 2022-01-06 RX ORDER — METFORMIN HCL 500 MG
1000 TABLET, EXTENDED RELEASE 24 HR ORAL 2 TIMES DAILY WITH MEALS
Qty: 120 TABLET | Refills: 2 | Status: SHIPPED | OUTPATIENT
Start: 2022-01-06 | End: 2022-05-04

## 2022-01-06 RX ORDER — DOCUSATE SODIUM 100 MG/1
100 CAPSULE, LIQUID FILLED ORAL
COMMUNITY

## 2022-01-06 ASSESSMENT — MIFFLIN-ST. JEOR: SCORE: 1806.22

## 2022-01-06 NOTE — PROGRESS NOTES
Jonathon Broussard is a 41 year old year old who is being evaluated via a billable telephone visit.      What phone number would you like to be contacted at? [phone]  How would you like to obtain your AVS? Mail a copy    Donovan Menard NREMT    Call time 14 min. Jessica Contreras CNP  [facility] [place]

## 2022-01-06 NOTE — NURSING NOTE
"Chief Complaint   Patient presents with     Follow Up     Return medical weight management.       Vitals:    01/06/22 1254   Weight: 258 lb 6.4 oz   Height: 5' 3\"       Body mass index is 45.77 kg/m .    Patient mentioned depression in questionnaire and when asked Depression screening declined. I confirmed with patient that she is seeing someone for her depression. She declined additional help on that front. Will mention to provider.    Donovan Menard, EMT  [facility]                      "

## 2022-01-06 NOTE — PROGRESS NOTES
27 minutes spent on the date of the encounter doing chart review, history and exam, documentation and further activities per the note    Return Medical Weight Management Note     Jonathon Broussard  MRN:  [id number]  :  1980  PO:  2022    Dear KRAIG Jane,    I had the pleasure of seeing your patient Jonathon Broussard. She is a 41 year old female who I am continuing to see for treatment of obesity related to:       2020   I have the following health issues associated with obesity: High Blood Pressure, Sleep Apnea, GERD (Reflux), Asthma   I have the following symptoms associated with obesity: None of the above       Assessment & Plan   Problem List Items Addressed This Visit        Digestive    Morbid obesity (H)     Continues to struggle with cravings. GLP1 not covered by insurance. Continues topiramate 75mg BID and metformin 1000mg once daily. Struggling to not gain weight with psych meds. Recently tried expensive meal replacement she found on the Internet. Helped with hunger and cravings. Recommend trying less expensive brand as meal replacements for 2 meals a day and then a meal on her own. Discussed options like Orgain which can be purchased at [employer].     Plan:  Continue metformin  Continue topiramate  Try meal replacement protein shake for 2 meals a day   One meal on your own  Follow up 3 months                   INTERVAL HISTORY:  Last seen 10/2021- attempted to get GLP1 covered- not covered by insurance. Continue topiramate 75mg twice daily, continue metformin.     Recent broke up with partner. Feeling depressed. Doesn't know how to occupy time right now. A lot of time alone.     Not finding brother to be a good support at this time.     Today:  Continues to walk during the day   Lots of sweet cravings- limits sweets in the house  Snacking- watches serving size    Has tried protein shakes recently (david) which were really helpful with cravings and hunger but very expensive        CURRENT WEIGHT:   258 lbs 6.4 oz    Initial Weight (lbs): 228 lbs  Last Visits Weight: 115.8 kg (255 lb 3.2 oz)  Cumulative weight loss (lbs): -30.4  Weight Loss Percentage: -13.33%    Changes and Difficulties 1/6/2022   I have made the following changes to my diet since my last visit: Cut back on soda and sugars in general. Trying to snack less.   With regards to my diet, I am still struggling with: Cravings with sweets.   I have made the following changes to my activity/exercise since my last visit: Getting out and walking more.   With regards to my activity/exercise, I am still struggling with: Depression.         MEDICATIONS:   Current Outpatient Medications   Medication Sig Dispense Refill     escitalopram (LEXAPRO) 20 MG tablet TAKE 1 TAB BY MOUTH ONCE DAILY AFTER EATING       ACETAMINOPHEN EXTRA STRENGTH 500 MG tablet TAKE 1 TAB BY MOUTH THREE TIMES DAILY       amLODIPine (NORVASC) 10 MG tablet TAKE 1 TABLET BY MOUTH EVERY DAY DX HIGH BLOOD PRESSURE  11     Ascorbic Acid (VITAMIN C) 100 MG CHEW        atomoxetine (STRATTERA) 100 MG capsule One cap QAM (with one 25mg cap for total of 125mg QAM). Take after food.       atomoxetine (STRATTERA) 25 MG capsule One cap QAM (with one 100mg cap for total of 125mg QAM). Take after food.       azelastine (OPTIVAR) 0.05 % ophthalmic solution INSTILL 1 DROP INTO BOTH EYES TWICE DAILY       benzoyl peroxide 5 % external liquid        benztropine (COGENTIN) 0.5 MG tablet Take 0.5 mg by mouth       Calcium Polycarbophil (FIBER) 625 MG tablet TAKE 2 TABS (1,250mg) BY MOUTH TWICE DAILY       carvedilol (COREG) 12.5 MG tablet TAKE 3 TABLETS (37.5mg) BY MOUTH TWICE DAILY DX HIGH BLOOD PRESSURE  11     cholecalciferol (VITAMIN D-1000 MAX ST) 1000 units TABS Take 1 tablet by mouth every 24 hours       clindamycin (CLEOCIN T) 1 % external lotion        cloZAPine (CLOZARIL) 100 MG tablet 400mg QHS. (4 tabs QHS)       cyclobenzaprine (FLEXERIL) 10 MG tablet Take 1 tablet (10  mg) by mouth 3 times daily 90 tablet 3     diazepam (VALIUM) 10 MG tablet TAKE 1 TAB BY MOUTH THREE TIMES DAILY.IF TOO SEDATING ,CAN TAKE 1 2 TAB (5MG)  4     diphenhydrAMINE HCl 50 MG TABS One tab QID prn.       docusate sodium (DSS) 100 MG capsule Take 100 mg by mouth       fluticasone (FLOVENT HFA) 110 MCG/ACT inhaler Inhale 110 mcg into the lungs       GABAPENTIN PO Take 1,200 mg by mouth 2 times daily Two tablets twice daily       hydrocortisone (CORTAID) 1 % external cream        ketoconazole (NIZORAL) 2 % external cream        LACTASE ENZYME 3000 units tablet TAKE 1 TAB BY MOUTH THREE TIMES DAILY AS NEEDED WITH FIRST BITE OF DAIRY CONTAINING FOOD       LANsoprazole (PREVACID) 30 MG DR capsule TAKE 1 CAP BY MOUTH ONCE DAILY 30 MINS PRIOR TO FIRST MEAL OF THE DAY 90 capsule 3     levothyroxine (SYNTHROID/LEVOTHROID) 100 MCG tablet TAKE 1 TAB BY MOUTH ON AN EMPTY STOMACH AT LEAST 30 MINS PRIOR TO FOOD OR MEDS       levothyroxine (SYNTHROID/LEVOTHROID) 88 MCG tablet TAKE 1 TAB BY MOUTH ONCE DAILY ON AN EMPTY STOMACH AND 30 MINUTES PRIOR TO OTHER MEDICATIONS       LEVOTHYROXINE SODIUM PO Take 112 mcg by mouth       lisinopril (PRINIVIL/ZESTRIL) 5 MG tablet TAKE 1 TAB BY MOUTH ONCE DAILY  11     lithium (ESKALITH) 600 MG capsule Take 1,200 mg by mouth daily        LORazepam (ATIVAN) 2 MG tablet TAKE 1 TAB BY MOUTH THREE TIMES DAILY  4     Lubricants (SURGICAL LUBRICANT) external gel        LUBRICATING PLUS EYE DROPS 0.5 % SOLN ophthalmic solution INSTILL 1 DROP INTO EACH EYE FOUR TIMES DAILY  11     lurasidone (LATUDA) 40 MG TABS tablet Take 120 mg by mouth daily with food        magnesium citrate 1.745 GM/30ML solution Take 296 mLs by mouth once       melatonin 5 MG sublingual tablet TAKE 1 TAB BY MOUTH AT BEDTIME       melatonin 5 MG tablet Take 5 mg by mouth       metFORMIN (GLUCOPHAGE-XR) 500 MG 24 hr tablet Take 2 tablets (1,000 mg) by mouth 2 times daily (with meals) 120 tablet 2     Multiple Vitamin  (DAILY-KONSTANTIN) TABS Take 1 tablet by mouth At Bedtime       Multiple Vitamins-Minerals (CERTA-KONSTANTIN PO)        MYRBETRIQ 25 MG 24 hr tablet TAKE 1 TAB BY MOUTH ONCE DAILY       nicotine polacrilex (NICORETTE) 4 MG gum        nystatin-triamcinolone (MYCOLOG) 233261-2.1 UNIT/GM-% external ointment APPLY TO AFFECTED AREA(S) ON LABIA TWICE DAILY FOR ONE WEEK FOR CANDIDA       olopatadine (PATANOL) 0.1 % ophthalmic solution INSTILL 1 DROP TO BOTH EYES TWICE A DAY  5     oxybutynin (DITROPAN-XL) 10 MG 24 hr tablet TAKE 1 TAB BY MOUTH ONCE DAILY  99     Paliperidone (INVEGA PO) Take 9 mg by mouth       paliperidone (INVEGA SUSTENNA) 234 MG/1.5ML SUSP Inject 234 mg into the muscle       PARoxetine (PAXIL) 20 MG tablet TAKE 1/2 TAB (10MG) BY MOUTH DAILY FOR 1 WEEK TAKE WITH FOOD;TAKE 1 TAB BY MOUTH DAILY TAKE WITH FOOD       polyethylene glycol (MIRALAX) powder Take 17 g (1 capful) by mouth daily 510 g 11     simethicone (MYLICON) 125 MG chewable tablet Take 125 mg by mouth 2 times daily       SM MICONAZOLE 7 2 % cream INSERT 1 APPLICATORFUL VAGINALLY AT BEDTIME FOR 7 DAYS       SM NICOTINE POLACRILEX 2 MG gum CHEW 1 PIECE EVERY HOUR AS NEEDED FOR NICOTINE CRAVINGS  16     Specialty Vitamins Products (VITAMINS FOR HAIR) CAPS Take 1 tablet by mouth daily       topiramate (TOPAMAX) 25 MG tablet Take 3 tablets (75 mg) by mouth 2 times daily 180 tablet 3     triamcinolone (KENALOG) 0.025 % cream APPLY TWICE DAILY TO RASH ON WRIST AND UNDER ARMPITS  1     triamcinolone (KENALOG) 0.1 % external cream APPLY TOPICALLY TO BOTH HANDS TWICE DAILY AS NEEDED FOR 2 WEEKS AT A TIME       valACYclovir (VALTREX) 500 MG tablet TAKE 1 TAB BY MOUTH DAILY       VENLAFAXINE HCL PO Take 375 mg by mouth 2 times daily ER       VITAMIN D, CHOLECALCIFEROL, PO Take 2,000 Units by mouth daily          Weight Loss Medication History Reviewed With Patient 1/6/2022   Which weight loss medications are you currently taking on a regular basis?  Topamax  "(topiramate)   Are you having any side effects from the weight loss medication that we have prescribed you? No   If you are having side effects please describe: -       Orders Only on 07/23/2018   Component Date Value Ref Range Status     Specimen Description 07/23/2018 Feces   Final     C Diff Toxin B PCR 07/23/2018 Positive* NEG^Negative Final    Comment: Positive: Toxin producing Clostridium difficile target DNA sequences detected,   presumed positive for Clostridium difficile toxin B.  Clostridium difficile (Requires Enteric Isolation)  FDA approved assay performed using [employer] GeneXpert real-time PCR.           PHYSICAL EXAM:  Objective    Ht 1.6 m (5' 3\")   Wt 117.2 kg (258 lb 6.4 oz)   BMI 45.77 kg/m             Vitals:  No vitals were obtained today due to virtual visit.    GENERAL: Healthy, alert and no distress  EYES: Eyes grossly normal to inspection.  No discharge or erythema, or obvious scleral/conjunctival abnormalities.  RESP: No audible wheeze, cough, or visible cyanosis.  No visible retractions or increased work of breathing.    SKIN: Visible skin clear. No significant rash, abnormal pigmentation or lesions.  NEURO: Cranial nerves grossly intact.  Mentation and speech appropriate for age.  PSYCH: Mentation appears normal, affect normal/bright, judgement and insight intact, normal speech and appearance well-groomed.        Sincerely,    Jessica Contreras NP  "

## 2022-01-06 NOTE — LETTER
2022       RE: Jonathon Broussard  [address]  [address]  [facility] [address]     Dear Colleague,    Thank you for referring your patient, Jonathon Broussard, to the [facility] at [facility]. Please see a copy of my visit note below.    Jonathon Broussard is a 41 year old year old who is being evaluated via a billable telephone visit.      What phone number would you like to be contacted at? [phone]  How would you like to obtain your AVS? Mail a copy    Donovan Menard NREMT    Call time 14 min. Jessica Contreras CNP  [facility] [place]         27 minutes spent on the date of the encounter doing chart review, history and exam, documentation and further activities per the note    Return Medical Weight Management Note     Jonathon Broussard  MRN:  [id number]  :  1980  PO:  2022    Dear KRAIG Jane,    I had the pleasure of seeing your patient Jonathon Broussard. She is a 41 year old female who I am continuing to see for treatment of obesity related to:       2020   I have the following health issues associated with obesity: High Blood Pressure, Sleep Apnea, GERD (Reflux), Asthma   I have the following symptoms associated with obesity: None of the above       Assessment & Plan   Problem List Items Addressed This Visit        Digestive    Morbid obesity (H)     Continues to struggle with cravings. GLP1 not covered by insurance. Continues topiramate 75mg BID and metformin 1000mg once daily. Struggling to not gain weight with psych meds. Recently tried expensive meal replacement she found on the Internet. Helped with hunger and cravings. Recommend trying less expensive brand as meal replacements for 2 meals a day and then a meal on her own. Discussed options like Orgain which can be purchased at [employer].     Plan:  Continue metformin  Continue topiramate  Try meal replacement  protein shake for 2 meals a day   One meal on your own  Follow up 3 months                   INTERVAL HISTORY:  Last seen 10/2021- attempted to get GLP1 covered- not covered by insurance. Continue topiramate 75mg twice daily, continue metformin.     Recent broke up with partner. Feeling depressed. Doesn't know how to occupy time right now. A lot of time alone.     Not finding brother to be a good support at this time.     Today:  Continues to walk during the day   Lots of sweet cravings- limits sweets in the house  Snacking- watches serving size    Has tried protein shakes recently (david) which were really helpful with cravings and hunger but very expensive       CURRENT WEIGHT:   258 lbs 6.4 oz    Initial Weight (lbs): 228 lbs  Last Visits Weight: 115.8 kg (255 lb 3.2 oz)  Cumulative weight loss (lbs): -30.4  Weight Loss Percentage: -13.33%    Changes and Difficulties 1/6/2022   I have made the following changes to my diet since my last visit: Cut back on soda and sugars in general. Trying to snack less.   With regards to my diet, I am still struggling with: Cravings with sweets.   I have made the following changes to my activity/exercise since my last visit: Getting out and walking more.   With regards to my activity/exercise, I am still struggling with: Depression.         MEDICATIONS:   Current Outpatient Medications   Medication Sig Dispense Refill     escitalopram (LEXAPRO) 20 MG tablet TAKE 1 TAB BY MOUTH ONCE DAILY AFTER EATING       ACETAMINOPHEN EXTRA STRENGTH 500 MG tablet TAKE 1 TAB BY MOUTH THREE TIMES DAILY       amLODIPine (NORVASC) 10 MG tablet TAKE 1 TABLET BY MOUTH EVERY DAY DX HIGH BLOOD PRESSURE  11     Ascorbic Acid (VITAMIN C) 100 MG CHEW        atomoxetine (STRATTERA) 100 MG capsule One cap QAM (with one 25mg cap for total of 125mg QAM). Take after food.       atomoxetine (STRATTERA) 25 MG capsule One cap QAM (with one 100mg cap for total of 125mg QAM). Take after food.       azelastine  (OPTIVAR) 0.05 % ophthalmic solution INSTILL 1 DROP INTO BOTH EYES TWICE DAILY       benzoyl peroxide 5 % external liquid        benztropine (COGENTIN) 0.5 MG tablet Take 0.5 mg by mouth       Calcium Polycarbophil (FIBER) 625 MG tablet TAKE 2 TABS (1,250mg) BY MOUTH TWICE DAILY       carvedilol (COREG) 12.5 MG tablet TAKE 3 TABLETS (37.5mg) BY MOUTH TWICE DAILY DX HIGH BLOOD PRESSURE  11     cholecalciferol (VITAMIN D-1000 MAX ST) 1000 units TABS Take 1 tablet by mouth every 24 hours       clindamycin (CLEOCIN T) 1 % external lotion        cloZAPine (CLOZARIL) 100 MG tablet 400mg QHS. (4 tabs QHS)       cyclobenzaprine (FLEXERIL) 10 MG tablet Take 1 tablet (10 mg) by mouth 3 times daily 90 tablet 3     diazepam (VALIUM) 10 MG tablet TAKE 1 TAB BY MOUTH THREE TIMES DAILY.IF TOO SEDATING ,CAN TAKE 1 2 TAB (5MG)  4     diphenhydrAMINE HCl 50 MG TABS One tab QID prn.       docusate sodium (DSS) 100 MG capsule Take 100 mg by mouth       fluticasone (FLOVENT HFA) 110 MCG/ACT inhaler Inhale 110 mcg into the lungs       GABAPENTIN PO Take 1,200 mg by mouth 2 times daily Two tablets twice daily       hydrocortisone (CORTAID) 1 % external cream        ketoconazole (NIZORAL) 2 % external cream        LACTASE ENZYME 3000 units tablet TAKE 1 TAB BY MOUTH THREE TIMES DAILY AS NEEDED WITH FIRST BITE OF DAIRY CONTAINING FOOD       LANsoprazole (PREVACID) 30 MG DR capsule TAKE 1 CAP BY MOUTH ONCE DAILY 30 MINS PRIOR TO FIRST MEAL OF THE DAY 90 capsule 3     levothyroxine (SYNTHROID/LEVOTHROID) 100 MCG tablet TAKE 1 TAB BY MOUTH ON AN EMPTY STOMACH AT LEAST 30 MINS PRIOR TO FOOD OR MEDS       levothyroxine (SYNTHROID/LEVOTHROID) 88 MCG tablet TAKE 1 TAB BY MOUTH ONCE DAILY ON AN EMPTY STOMACH AND 30 MINUTES PRIOR TO OTHER MEDICATIONS       LEVOTHYROXINE SODIUM PO Take 112 mcg by mouth       lisinopril (PRINIVIL/ZESTRIL) 5 MG tablet TAKE 1 TAB BY MOUTH ONCE DAILY  11     lithium (ESKALITH) 600 MG capsule Take 1,200 mg by mouth daily         LORazepam (ATIVAN) 2 MG tablet TAKE 1 TAB BY MOUTH THREE TIMES DAILY  4     Lubricants (SURGICAL LUBRICANT) external gel        LUBRICATING PLUS EYE DROPS 0.5 % SOLN ophthalmic solution INSTILL 1 DROP INTO EACH EYE FOUR TIMES DAILY  11     lurasidone (LATUDA) 40 MG TABS tablet Take 120 mg by mouth daily with food        magnesium citrate 1.745 GM/30ML solution Take 296 mLs by mouth once       melatonin 5 MG sublingual tablet TAKE 1 TAB BY MOUTH AT BEDTIME       melatonin 5 MG tablet Take 5 mg by mouth       metFORMIN (GLUCOPHAGE-XR) 500 MG 24 hr tablet Take 2 tablets (1,000 mg) by mouth 2 times daily (with meals) 120 tablet 2     Multiple Vitamin (DAILY-KONSTANTIN) TABS Take 1 tablet by mouth At Bedtime       Multiple Vitamins-Minerals (CERTA-KONSTANTIN PO)        MYRBETRIQ 25 MG 24 hr tablet TAKE 1 TAB BY MOUTH ONCE DAILY       nicotine polacrilex (NICORETTE) 4 MG gum        nystatin-triamcinolone (MYCOLOG) 514704-9.1 UNIT/GM-% external ointment APPLY TO AFFECTED AREA(S) ON LABIA TWICE DAILY FOR ONE WEEK FOR CANDIDA       olopatadine (PATANOL) 0.1 % ophthalmic solution INSTILL 1 DROP TO BOTH EYES TWICE A DAY  5     oxybutynin (DITROPAN-XL) 10 MG 24 hr tablet TAKE 1 TAB BY MOUTH ONCE DAILY  99     Paliperidone (INVEGA PO) Take 9 mg by mouth       paliperidone (INVEGA SUSTENNA) 234 MG/1.5ML SUSP Inject 234 mg into the muscle       PARoxetine (PAXIL) 20 MG tablet TAKE 1/2 TAB (10MG) BY MOUTH DAILY FOR 1 WEEK TAKE WITH FOOD;TAKE 1 TAB BY MOUTH DAILY TAKE WITH FOOD       polyethylene glycol (MIRALAX) powder Take 17 g (1 capful) by mouth daily 510 g 11     simethicone (MYLICON) 125 MG chewable tablet Take 125 mg by mouth 2 times daily       SM MICONAZOLE 7 2 % cream INSERT 1 APPLICATORFUL VAGINALLY AT BEDTIME FOR 7 DAYS       SM NICOTINE POLACRILEX 2 MG gum CHEW 1 PIECE EVERY HOUR AS NEEDED FOR NICOTINE CRAVINGS  16     Specialty Vitamins Products (VITAMINS FOR HAIR) CAPS Take 1 tablet by mouth daily       topiramate (TOPAMAX)  "25 MG tablet Take 3 tablets (75 mg) by mouth 2 times daily 180 tablet 3     triamcinolone (KENALOG) 0.025 % cream APPLY TWICE DAILY TO RASH ON WRIST AND UNDER ARMPITS  1     triamcinolone (KENALOG) 0.1 % external cream APPLY TOPICALLY TO BOTH HANDS TWICE DAILY AS NEEDED FOR 2 WEEKS AT A TIME       valACYclovir (VALTREX) 500 MG tablet TAKE 1 TAB BY MOUTH DAILY       VENLAFAXINE HCL PO Take 375 mg by mouth 2 times daily ER       VITAMIN D, CHOLECALCIFEROL, PO Take 2,000 Units by mouth daily          Weight Loss Medication History Reviewed With Patient 1/6/2022   Which weight loss medications are you currently taking on a regular basis?  Topamax (topiramate)   Are you having any side effects from the weight loss medication that we have prescribed you? No   If you are having side effects please describe: -       Orders Only on 07/23/2018   Component Date Value Ref Range Status     Specimen Description 07/23/2018 Feces   Final     C Diff Toxin B PCR 07/23/2018 Positive* NEG^Negative Final    Comment: Positive: Toxin producing Clostridium difficile target DNA sequences detected,   presumed positive for Clostridium difficile toxin B.  Clostridium difficile (Requires Enteric Isolation)  FDA approved assay performed using [employer] GeneXpert real-time PCR.         PHYSICAL EXAM:  Objective    Ht 1.6 m (5' 3\")   Wt 117.2 kg (258 lb 6.4 oz)   BMI 45.77 kg/m      Vitals:  No vitals were obtained today due to virtual visit.    GENERAL: Healthy, alert and no distress  EYES: Eyes grossly normal to inspection.  No discharge or erythema, or obvious scleral/conjunctival abnormalities.  RESP: No audible wheeze, cough, or visible cyanosis.  No visible retractions or increased work of breathing.    SKIN: Visible skin clear. No significant rash, abnormal pigmentation or lesions.  NEURO: Cranial nerves grossly intact.  Mentation and speech appropriate for age.  PSYCH: Mentation appears normal, affect normal/bright, judgement and insight " intact, normal speech and appearance well-groomed.      Sincerely,    Jessica Contreras, NP

## 2022-01-07 NOTE — ASSESSMENT & PLAN NOTE
Continues to struggle with cravings. GLP1 not covered by insurance. Continues topiramate 75mg BID and metformin 1000mg once daily. Struggling to not gain weight with psych meds. Recently tried expensive meal replacement she found on the Internet. Helped with hunger and cravings. Recommend trying less expensive brand as meal replacements for 2 meals a day and then a meal on her own. Discussed options like Orgain which can be purchased at [employer].     Plan:  Continue metformin  Continue topiramate  Try meal replacement protein shake for 2 meals a day   One meal on your own  Follow up 3 months

## 2022-01-07 NOTE — PATIENT INSTRUCTIONS
"Thank you for allowing us the privilege of caring for you. We hope we provided you with the excellent service you deserve.   Please let us know if there is anything else we can do for you so that we can be sure you are completely satisfied with your care experience.    To ensure the quality of our services you may be receiving a patient satisfaction survey from an independent patient satisfaction monitoring company.    The greatest compliment you can give is a \"Likely to Recommend\"    Your visit was with Jessica Contreras NP today.    Instructions per today's visit:     Varinder Broussard, it was great to visit with you today.  Here is a review of our visit.  If our clinic scheduler is not able to reach you please call [phone] to schedule your next appointments.      Plan:  Continue metformin  Continue topiramate  Try meal replacement protein shake for 2 meals a day   One meal on your own  Follow up 3 months     Information about Video Visits with Symplerth Glidden: video visit information  _________________________________________________________________________________________________________________________________________________________  Important contact and scheduling information:  Please call our contact center at [phone] to schedule your next appointments.  To find a lab location near you, please call [phone].  For any nursing questions or concerns call Diana Wong LPN at [phone] or Maegan Franco RN at [phone]  Please call during clinic hours Monday through Friday 8:00a - 4:00p if you have questions or you can contact us via [employer] at anytime and we will reply during clinic hours.    Lab results will be communicated through My Chart or letter (if My Chart not used). Please call the clinic if you have not received communication after 1 week or if you have any questions.?  Clinic Fax: " [phone]  _________________________________________________________________________________________________________________________________________________________  Meal Replacement Products:    Here is the link to our new e-store where you can purchase our meal replacement products     [employer] E-Store  [email or web address]    The one week starter kit is a great way to sample a variety of products and see what works for you.    If you want more information about the product go to: [employer] Meals  [email or web address]    If you are an employee or [facility] or  [employer] please contact your care team for a 10% estore discount    Free Shipping for orders over $75     Benefits of meal replacements products:    Portion and calorie control  Improved nutrition  Structured eating  Simplified food choices  Avoid contact with trigger foods  _________________________________________________________________________________________________________________________________________________________  Interested in working with a health ?  Health coaches work with you to improve your overall health and wellbeing.  They look at the whole person, and may involve discussion of different areas of life, including, but not limited to the four pillars of health (sleep, exercise, nutrition, and stress management). Discuss with your care team if you would like to start working a health .  Health Coaching-3 Pack: Schedule by calling [phone]    $99 for three health coaching visits    Visits may be done in person or via phone    Coaching is a partnership between the  and the client; Coaches do not prescribe or diagnose    Coaching helps inspire the client to reach his/her personal goals   _________________________________________________________________________________________________________________________________________________________  24 Week Healthy Lifestyle Plan:    Our  mission in the 24-week Healthy Lifestyle Plan is to provide you with individualized care by giving you the tools, education and support you need to lose weight and maintain a healthy lifestyle. In your 24-week journey, you ll be supported by a dedicated weight loss team that includes registered dietitians, medical weight management providers, health coaches, and nurses -- all with special expertise in weight loss -- to help you every step of the way.     Monthly meetings with your registered dietician or medical weight management provider help to review your progress, update your care plan, and make any adjustments needed to ensure success. Between these visits, weekly and bi-weekly health  visits will help you focus on the four pillars of weight loss -- stress, sleep, nutrition, and exercise -- and how you can best adapt each to achieve sustainable weight loss results.    In addition, you will be given exclusive access to online wellbeing classes through [employer].  Your initial visit will be with a medical weight management provider who will help to understand your weight loss goals and ensure this program is the right fit for you. Please let our team know if you are interested in the 24 week plan by sending a message to your care team or calling [phone] to schedule.  _________________________________________________________________________________________________________________________________________________________    COMPREHENSIVE WEIGHT MANAGEMENT PROGRAM  VIRTUAL SUPPORT GROUPS    For Support Group Information:      We offer support groups for patients who are working on weight loss and considering, preparing for or have had weight loss surgery.   There is no cost for this opportunity.  You are invited to attend the?Virtual Support Groups?provided by any of the following locations:    1. [facility] via [employer] Teams with Do Malhotra RN  2.   [facility] via [employer]iji Teams with Jeremy Muñoz, PhD,  "  3.   [place] via [employer] Teams with Ester Jacinto RN  4.   [facility] via [employer] Teams with LEYDA [facility]    The following Support Group information can also be found on our website:  [email or web address]      [facility] Weight Loss Surgery Support Group    [facility] Weight Loss Surgery Support Group  The support group is a patient-lead forum that meets monthly to share experiences, encouragement and education. It is open to those who have had weight loss surgery, are scheduled for surgery, and those who are considering surgery.   WHEN: This group meets on the 3rd Wednesday of each month from 5:00PM - 6:00PM virtually using Microsoft Teams.   FACILITATOR: Led by Do Malhotra, the program's Clinical Coordinator.   TO REGISTER: Please contact the clinic via [employer] or call the nurse line directly at [phone] to inform our staff that you would like an invite sent to you and to let us know the email you would like the invite sent to. Prior to the meeting, a link with directions on how to join the meeting will be sent to you.    2021 Meetings  August 18: \"Let's Talk\" a time for the group to share.  September 15: \"Let's Talk\" a time for the group to share.  October 20: \"Let's Talk\" a time for the group to share.  November 17: Lindsay Vail RD, GINA \"Protein, Metabolism and Meal Planning\"  December 15: Samuel Pascual RD, LD will speak, \"Recipe Modification\"    [facility] Support Groups    Connections: Bariatric Care Support Group?  This is open to all [facility] (and those external to this program) pre- and post- operative bariatric surgery patients as well as their support system.   WHEN: This group meets the 2nd Tuesday of each month from 6:30 PM - 8:00 PM virtually using Microsoft Teams.   FACILITATOR: Led by Jeremy Muñoz, Ph.D who is a Licensed " "Psychologist with the [facility] Comprehensive Weight Management Program.   TO REGISTER: Please send an email to Jeremy Muñoz, Ph.D., LP at?[email or web address]?if you would like an invitation to the group and to learn about using Microsoft Teams.    2021 Meetings  August 10: Open Forum  September 14: Guest Speaker: Ester Jacinto RN,CBN, CIC, [facility] Comprehensive Weight Management Program, \"Your Hospital Stay and Post-Operative Compliance\"  October 12: Open Forum  November 9: Guest Speaker: Pham Arshad RD,LD, [facility] Comprehensive Weight Management Program,\"Holiday Eating\".  December 14: Guest Speaker Lauren Lopez MD, MPH, [facility], [facility], \"Body Contouring Surgery for Bariatric Surgery Patients\"     Connections: Post-Operative Bariatric Surgery Support Group  This is a support group for [facility] bariatric patients (and those external to [facility]) who have had bariatric surgery and are at least 3 months post-surgery.  WHEN: This support group meets the 4th Wednesday of the month from 11:00 AM - 12:00 PM virtually using Microsoft Teams.   FACILITATOR: Led by Certified Bariatric Nurse, Ester Jacinto RN.   TO REGISTER: Please send an email to Ester at [email or web address] if you would like an invitation to the group and to learn about using [employer].      [facility] Healthy Lifestyle Virtual Support Group    Healthy Lifestyle Virtual Support Group?  This is 60 minutes of small group guided discussion, support and resources. All are welcome who want a healthy lifestyle.  WHEN: This group meets monthly on a Friday from 12:30 PM - to 1:30 PM virtually using Microsoft Teams.   FACILITATOR: Led by National Board Certified Health , Ester Byrd, [facility].   TO REGISTER: Please send an email to Ester [email or web address] to receive monthly invites to the group or if you have any questions " about having a health .  Prior to the meeting, a link with directions on how to join the meeting will be sent to you.    2021 Meetings  August 27: Open Forum  September 24: Sleep and the 7 Types of Rest  October 29: Open Forum  November 19: Gratitude     December 10: Open Forum    2022 Meetings January 21: Healthy Habits  February 25: Open Forum  March 18: Setting limits and Boundaries  April 29: Nutrition  May 20: Open Forum  ____________________________________________________________________________________________________________________________________________________________________________  [facility] Get Moving Program  Our team of physical therapists is trained to help you understand and take control of your condition. They will perform a thorough evaluation to determine your ability for activity and develop a customized plan to fit your goals and physical ability.  Scheduling: Unsure if the Get Moving program is right for you? Discuss the program with your medical provider or diabetes educator. You can also call us at [phone] to ask questions or schedule an appointment.   [facility] Get Moving Program  ____________________________________________________________________________________________________________________________________________________________________________  [facility] Diabetes Prevention Program (DPP)  If you have prediabetes and Medicare please contact us via [employer] to learn more about the Diabetes Prevention Program (DPP)  Program Details:  [facility] offers the year-long Diabetes Prevention Program (DPP). The program helps you to make lifestyle changes that prevent or delay type 2 diabetes by supporting healthy eating, increased physical activity, stress reduction and use of coping skills.   On average, previous [facility] DPP cohorts have lost and maintained at least 5% of their starting weight throughout the program and averaged more than  150 minutes of physical activity per week.  Participants meet weekly for one-hour group sessions over sixteen weeks, every other week for the next 8 weeks, and monthly for the last six months.   A year-long maintenance program is also available for participants who complete the first year.   Location & Cost:   During the COVID-19 Public Health Emergency, the program is offered virtually. When in-person classes can resume, they will be held at [facility].  For people with Medicare, the program is covered in full. A self-pay option will also be available for those with non-Medicare insurance plans.   _________________________________________________________________________________________________________________________________________________________  Bluetooth Scale:    We hope to provide you with high quality virtual healthcare visits while social distancing for COVID-19 is necessary, as well as in the future when virtual visits may be more convenient for you.     Our technology team made it possible for Bluetooth scales to send weight measurements to our electronic medical record. This allows weights from you weighing at home to securely flow into the medical record, which will improve telephone and virtual visits.   Additionally, studies have shown that adults actually lose more weight when their weights are automatically sent to someone else, and also that this process is not stressful for those adults.    Below is a link for purchasing the scale, with a discount code for our patients. You may call your insurance company to see if they will reimburse you for the cost of the scale, as a piece of durable medical equipment. The scales only go up to a weight of 400 pounds. This is an issue and we are working with the developer on increasing this. We found no scales that go over 400lb that have blue-tooth for connecting to MyChart.    Scale to purchase: the [employer]  Body  Scale:  https://www.[employer]/us/en/body/shop?[email or web address]&gclsrc=aw.ds    Discount Code: We have a discount code for our patients to bring the cost down to $50, Discount code is: [place]_Scale_20%off      Thank you,   [facility] Comprehensive Weight Management Team

## 2022-01-17 ENCOUNTER — TELEPHONE (OUTPATIENT)
Dept: ENDOCRINOLOGY | Facility: CLINIC | Age: 42
End: 2022-01-17
Payer: COMMERCIAL

## 2022-01-17 NOTE — TELEPHONE ENCOUNTER
tamela to schedule 3 month follow up with KAVITA Arnold, left call center phone number and sent yosi.

## 2022-03-17 ENCOUNTER — TELEPHONE (OUTPATIENT)
Dept: GASTROENTEROLOGY | Facility: CLINIC | Age: 42
End: 2022-03-17
Payer: COMMERCIAL

## 2022-03-17 NOTE — TELEPHONE ENCOUNTER
[facility] Call Center    Phone Message    May a detailed message be left on voicemail: yes     Reason for Call: Other:      Pt was scheduled for an appt for 06/24/2022 by Isi at her care facility. She stated that thept has lost 10 pounds over 3 months.    Please review and contact pt if an earlier appt is available.    Action Taken: Message routed to:  [facility] Gastro    Travel Screening: Not Applicable

## 2022-03-23 NOTE — PROGRESS NOTES
Jonathon Broussard is a 41 year old year old who is being evaluated via a billable telephone visit.      What phone number would you like to be contacted at? [phone]  How would you like to obtain your AVS? CAMDEN WigginsT

## 2022-03-24 ENCOUNTER — VIRTUAL VISIT (OUTPATIENT)
Dept: ENDOCRINOLOGY | Facility: CLINIC | Age: 42
End: 2022-03-24
Payer: COMMERCIAL

## 2022-03-24 VITALS — BODY MASS INDEX: 44.68 KG/M2 | WEIGHT: 252.2 LBS

## 2022-03-24 DIAGNOSIS — E66.01 MORBID OBESITY (H): ICD-10-CM

## 2022-03-24 PROCEDURE — 99212 OFFICE O/P EST SF 10 MIN: CPT | Mod: TEL | Performed by: NURSE PRACTITIONER

## 2022-03-24 NOTE — ASSESSMENT & PLAN NOTE
Down 6lb since last seen. Increased stress may have caused decreased appetite. Has been working on limiting portions and increasing hydration.     Plan:  Continue metformin  Goal is 64oz water daily   Short walk daily, increase as able

## 2022-03-24 NOTE — NURSING NOTE
Chief Complaint   Patient presents with     Follow Up     Return medical weight management.       Vitals:    03/24/22 0912   Weight: 252 lb 3.2 oz       Body mass index is 44.68 kg/m .      Donovan Menard, EMT  Surgery Clinic

## 2022-03-24 NOTE — PROGRESS NOTES
Call 7 min in length  12 minutes spent on the date of the encounter doing chart review, history and exam, documentation and further activities per the note    Return Medical Weight Management Note     Jonathon Broussard  MRN:  [id number]  :  1980  PO:  3/24/2022    Dear KRAIG Jane,    I had the pleasure of seeing your patient Jonathon Broussard. She is a 41 year old female who I am continuing to see for treatment of obesity related to:       2020   I have the following health issues associated with obesity: High Blood Pressure, Sleep Apnea, GERD (Reflux), Asthma   I have the following symptoms associated with obesity: None of the above       Assessment & Plan   Problem List Items Addressed This Visit        Digestive    Morbid obesity (H)     Down 6lb since last seen. Increased stress may have caused decreased appetite. Has been working on limiting portions and increasing hydration.     Plan:  Continue metformin  Goal is 64oz water daily   Short walk daily, increase as able                   INTERVAL HISTORY:  Last seen 22- was going to try protein shakes.     Today-  Protein shakes too expensive   Decreased hunger since seen  Working on decreasing intake   Working on increasing fluids   Increased stress   Waiting for insoles for plantar fascitis     CURRENT WEIGHT:   252 lbs 3.2 oz                  Changes and Difficulties 3/24/2022   I have made the following changes to my diet since my last visit: Haven't been eating as much.   With regards to my diet, I am still struggling with: Choking on food. Scheduled to see specialist.   I have made the following changes to my activity/exercise since my last visit: Still walking.   With regards to my activity/exercise, I am still struggling with: Plantar fasciitis.         MEDICATIONS:   Current Outpatient Medications   Medication Sig Dispense Refill     ACETAMINOPHEN EXTRA STRENGTH 500 MG tablet TAKE 1 TAB BY MOUTH THREE TIMES DAILY       amLODIPine  (NORVASC) 10 MG tablet TAKE 1 TABLET BY MOUTH EVERY DAY DX HIGH BLOOD PRESSURE  11     Ascorbic Acid (VITAMIN C) 100 MG CHEW        atomoxetine (STRATTERA) 100 MG capsule One cap QAM (with one 25mg cap for total of 125mg QAM). Take after food.       atomoxetine (STRATTERA) 25 MG capsule One cap QAM (with one 100mg cap for total of 125mg QAM). Take after food.       azelastine (OPTIVAR) 0.05 % ophthalmic solution INSTILL 1 DROP INTO BOTH EYES TWICE DAILY       benzoyl peroxide 5 % external liquid        benztropine (COGENTIN) 0.5 MG tablet Take 0.5 mg by mouth       Calcium Polycarbophil (FIBER) 625 MG tablet TAKE 2 TABS (1,250mg) BY MOUTH TWICE DAILY       carvedilol (COREG) 12.5 MG tablet TAKE 3 TABLETS (37.5mg) BY MOUTH TWICE DAILY DX HIGH BLOOD PRESSURE  11     cholecalciferol (VITAMIN D-1000 MAX ST) 1000 units TABS Take 1 tablet by mouth every 24 hours       clindamycin (CLEOCIN T) 1 % external lotion        cloZAPine (CLOZARIL) 100 MG tablet 400mg QHS. (4 tabs QHS)       cyclobenzaprine (FLEXERIL) 10 MG tablet Take 1 tablet (10 mg) by mouth 3 times daily 90 tablet 3     diazepam (VALIUM) 10 MG tablet TAKE 1 TAB BY MOUTH THREE TIMES DAILY.IF TOO SEDATING ,CAN TAKE 1 2 TAB (5MG)  4     diclofenac (VOLTAREN) 1 % topical gel APPLY A PEA SIZED AMOUNT TO AREAS ON FEET THAT ARE PAINFUL UP TO 4 TIMES DAILY AS NEEDED FOR PAIN.       diphenhydrAMINE HCl 50 MG TABS One tab QID prn.       docusate sodium (DSS) 100 MG capsule Take 100 mg by mouth       escitalopram (LEXAPRO) 20 MG tablet TAKE 1 TAB BY MOUTH ONCE DAILY AFTER EATING       fluticasone (FLOVENT HFA) 110 MCG/ACT inhaler Inhale 110 mcg into the lungs       GABAPENTIN PO Take 1,200 mg by mouth 2 times daily Two tablets twice daily       hydrocortisone (CORTAID) 1 % external cream        ketoconazole (NIZORAL) 2 % external cream        LACTASE ENZYME 3000 units tablet TAKE 1 TAB BY MOUTH THREE TIMES DAILY AS NEEDED WITH FIRST BITE OF DAIRY CONTAINING FOOD        LANsoprazole (PREVACID) 30 MG DR capsule TAKE 1 CAP BY MOUTH ONCE DAILY 30 MINS PRIOR TO FIRST MEAL OF THE DAY 90 capsule 3     levothyroxine (SYNTHROID/LEVOTHROID) 100 MCG tablet TAKE 1 TAB BY MOUTH ON AN EMPTY STOMACH AT LEAST 30 MINS PRIOR TO FOOD OR MEDS       levothyroxine (SYNTHROID/LEVOTHROID) 88 MCG tablet TAKE 1 TAB BY MOUTH ONCE DAILY ON AN EMPTY STOMACH AND 30 MINUTES PRIOR TO OTHER MEDICATIONS       LEVOTHYROXINE SODIUM PO Take 112 mcg by mouth       lisinopril (PRINIVIL/ZESTRIL) 5 MG tablet TAKE 1 TAB BY MOUTH ONCE DAILY  11     lithium (ESKALITH) 600 MG capsule Take 1,200 mg by mouth daily        LORazepam (ATIVAN) 2 MG tablet TAKE 1 TAB BY MOUTH THREE TIMES DAILY  4     Lubricants (SURGICAL LUBRICANT) external gel        LUBRICATING PLUS EYE DROPS 0.5 % SOLN ophthalmic solution INSTILL 1 DROP INTO EACH EYE FOUR TIMES DAILY  11     lurasidone (LATUDA) 40 MG TABS tablet Take 120 mg by mouth daily with food        magnesium citrate 1.745 GM/30ML solution Take 296 mLs by mouth once       melatonin 5 MG sublingual tablet TAKE 1 TAB BY MOUTH AT BEDTIME       melatonin 5 MG tablet Take 5 mg by mouth       metFORMIN (GLUCOPHAGE-XR) 500 MG 24 hr tablet Take 2 tablets (1,000 mg) by mouth 2 times daily (with meals) 120 tablet 2     Multiple Vitamin (DAILY-KONSTANTIN) TABS Take 1 tablet by mouth At Bedtime       Multiple Vitamins-Minerals (CERTA-KONSTANTIN PO)        MYRBETRIQ 25 MG 24 hr tablet TAKE 1 TAB BY MOUTH ONCE DAILY       nicotine polacrilex (NICORETTE) 4 MG gum        nystatin-triamcinolone (MYCOLOG) 151822-3.1 UNIT/GM-% external ointment APPLY TO AFFECTED AREA(S) ON LABIA TWICE DAILY FOR ONE WEEK FOR CANDIDA       olopatadine (PATANOL) 0.1 % ophthalmic solution INSTILL 1 DROP TO BOTH EYES TWICE A DAY  5     oxybutynin (DITROPAN-XL) 10 MG 24 hr tablet TAKE 1 TAB BY MOUTH ONCE DAILY  99     Paliperidone (INVEGA PO) Take 9 mg by mouth       paliperidone (INVEGA SUSTENNA) 234 MG/1.5ML SUSP Inject 234 mg into the  muscle       PARoxetine (PAXIL) 20 MG tablet TAKE 1/2 TAB (10MG) BY MOUTH DAILY FOR 1 WEEK TAKE WITH FOOD;TAKE 1 TAB BY MOUTH DAILY TAKE WITH FOOD       polyethylene glycol (MIRALAX) powder Take 17 g (1 capful) by mouth daily 510 g 11     simethicone (MYLICON) 125 MG chewable tablet Take 125 mg by mouth 2 times daily       SM MICONAZOLE 7 2 % cream INSERT 1 APPLICATORFUL VAGINALLY AT BEDTIME FOR 7 DAYS       SM NICOTINE POLACRILEX 2 MG gum CHEW 1 PIECE EVERY HOUR AS NEEDED FOR NICOTINE CRAVINGS  16     Specialty Vitamins Products (VITAMINS FOR HAIR) CAPS Take 1 tablet by mouth daily       topiramate (TOPAMAX) 25 MG tablet Take 3 tablets (75 mg) by mouth 2 times daily 180 tablet 3     triamcinolone (KENALOG) 0.025 % cream APPLY TWICE DAILY TO RASH ON WRIST AND UNDER ARMPITS  1     triamcinolone (KENALOG) 0.1 % external cream APPLY TOPICALLY TO BOTH HANDS TWICE DAILY AS NEEDED FOR 2 WEEKS AT A TIME       valACYclovir (VALTREX) 500 MG tablet TAKE 1 TAB BY MOUTH DAILY       VENLAFAXINE HCL PO Take 375 mg by mouth 2 times daily ER       VITAMIN D, CHOLECALCIFEROL, PO Take 2,000 Units by mouth daily          Weight Loss Medication History Reviewed With Patient 3/24/2022   Which weight loss medications are you currently taking on a regular basis?  Topamax (topiramate)   Are you having any side effects from the weight loss medication that we have prescribed you? No   If you are having side effects please describe: -       Orders Only on 07/23/2018   Component Date Value Ref Range Status     Specimen Description 07/23/2018 Feces   Final     C Diff Toxin B PCR 07/23/2018 Positive (A) NEG^Negative Final    Comment: Positive: Toxin producing Clostridium difficile target DNA sequences detected,   presumed positive for Clostridium difficile toxin B.  Clostridium difficile (Requires Enteric Isolation)  FDA approved assay performed using [employer] GeneXpert real-time PCR.           PHYSICAL EXAM:  Objective    Wt 114.4 kg (252 lb 3.2  oz)   BMI 44.68 kg/m             Vitals:  No vitals were obtained today due to virtual visit.    GENERAL: Healthy, alert and no distress  EYES: Eyes grossly normal to inspection.  No discharge or erythema, or obvious scleral/conjunctival abnormalities.  RESP: No audible wheeze, cough, or visible cyanosis.  No visible retractions or increased work of breathing.    SKIN: Visible skin clear. No significant rash, abnormal pigmentation or lesions.  NEURO: Cranial nerves grossly intact.  Mentation and speech appropriate for age.  PSYCH: Mentation appears normal, affect normal/bright, judgement and insight intact, normal speech and appearance well-groomed.        Sincerely,    Jessica Contreras NP

## 2022-03-24 NOTE — PATIENT INSTRUCTIONS
"Thank you for allowing us the privilege of caring for you. We hope we provided you with the excellent service you deserve.   Please let us know if there is anything else we can do for you so that we can be sure you are completely satisfied with your care experience.    To ensure the quality of our services you may be receiving a patient satisfaction survey from an independent patient satisfaction monitoring company.    The greatest compliment you can give is a \"Likely to Recommend\"    Your visit was with Jessica Contreras NP today.    Instructions per today's visit:     Varinder Broussard, it was great to visit with you today.  Here is a review of our visit.  If our clinic scheduler is not able to reach you please call [phone] to schedule your next appointments.    Plan:  Continue metformin  Goal 4 16oz bottles of water/ crystal light per day   Goal short walk daily, increase as able   Follow up 3 months       Information about Video Visits with [facility]: video visit information  _________________________________________________________________________________________________________________________________________________________  If you are asked by your clinic team to have your blood pressure checked:  [facility] do offer several locations for blood pressure checks. Please follow the below link to schedule an appointment. Scheduling an appointment at the pharmacy for a blood pressure check is now preferred.    Appointment Plus ([email or web address])  _________________________________________________________________________________________________________________________________________________________  Important contact and scheduling information:  Please call our contact center at [phone] to schedule your next appointments.  To find a lab location near you, please call [phone].  For any nursing questions or concerns call Diana Wong LPN at [phone] or Maegan Franco RN at " [phone]  Please call during clinic hours Monday through Friday 8:00a - 4:00p if you have questions or you can contact us via [employer] at anytime and we will reply during clinic hours.    Lab results will be communicated through My Chart or letter (if My Chart not used). Please call the clinic if you have not received communication after 1 week or if you have any questions.?  Clinic Fax: [phone]    _________________________________________________________________________________________________________________________________________________________  Meal Replacement Products:    Here is the link to our new e-store where you can purchase our meal replacement products    [employer] E-Store  Tagent/store    The one week starter kit is a great way to sample a variety of products and see what works for you.    If you want more information about the product go to: [email or web address]    If you are an employee or [facility] or [place] please contact your care team for a 10% estore discount    Free Shipping for orders over $75     Benefits of meal replacements products:    Portion and calorie control  Improved nutrition  Structured eating  Simplified food choices  Avoid contact with trigger foods  _________________________________________________________________________________________________________________________________________________________  Interested in working with a health ?  Health coaches work with you to improve your overall health and wellbeing.  They look at the whole person, and may involve discussion of different areas of life, including, but not limited to the four pillars of health (sleep, exercise, nutrition, and stress management). Discuss with your care team if you would like to start working a health .  Health Coaching-3 Pack: Schedule by calling [phone]    $99 for three health coaching visits    Visits may  be done in person or via phone    Coaching is a partnership between the  and the client; Coaches do not prescribe or diagnose    Coaching helps inspire the client to reach his/her personal goals   _________________________________________________________________________________________________________________________________________________________  24 Week Healthy Lifestyle Plan:    Our mission in the 24-week Healthy Lifestyle Plan is to provide you with individualized care by giving you the tools, education and support you need to lose weight and maintain a healthy lifestyle. In your 24-week journey, you ll be supported by a dedicated weight loss team that includes registered dietitians, medical weight management providers, health coaches, and nurses -- all with special expertise in weight loss -- to help you every step of the way.     Monthly meetings with your registered dietician or medical weight management provider help to review your progress, update your care plan, and make any adjustments needed to ensure success. Between these visits, weekly and bi-weekly health  visits will help you focus on the four pillars of weight loss -- stress, sleep, nutrition, and exercise -- and how you can best adapt each to achieve sustainable weight loss results.    In addition, you will be given exclusive access to online wellbeing classes through [employer].  Your initial visit will be with a medical weight management provider who will help to understand your weight loss goals and ensure this program is the right fit for you. Please let our team know if you are interested in the 24 week plan by sending a message to your care team or calling [phone] to schedule.  _________________________________________________________________________________________________________________________________________________________    COMPREHENSIVE WEIGHT MANAGEMENT PROGRAM  VIRTUAL SUPPORT GROUPS    For Support Group  "Information:      We offer support groups for patients who are working on weight loss and considering, preparing for or have had weight loss surgery.   There is no cost for this opportunity.  You are invited to attend the?Virtual Support Groups?provided by any of the following locations:    1. [facility] via Microsoft Teams with Do Malhotra RN  2.   [place] via [employer] Media with Jeremy Muñoz, PhD, LP  3.   [place] via Mult[employer] Media with Ester Jacinto RN  4.   [facility] via [employer] Teams with Ester Byrd [facility]    The following Support Group information can also be found on our website:  [email or web address]    [facility] Weight Loss Surgery Support Group    [facility] Weight Loss Surgery Support Group  The support group is a patient-lead forum that meets monthly to share experiences, encouragement and education. It is open to those who have had weight loss surgery, are scheduled for surgery, and those who are considering surgery.   WHEN: This group meets on the 3rd Wednesday of each month from 5:00PM - 6:00PM virtually using Microsoft Teams.   FACILITATOR: Led by Do Malhotra RD, GINA, RN, the program's Clinical Coordinator.   TO REGISTER: Please contact the clinic via Envia LÃ¡ or call the nurse line directly at [phone] to inform our staff that you would like an invite sent to you and to let us know the email you would like the invite sent to. Prior to the meeting, a link with directions on how to join the meeting will be sent to you.    2022 Meetings  January 19: \"Let's Talk\" a time for the group to share.  February 16: \"Let's Talk\" a time for the group to share.  March 16: Guest Speakers: Psychologists, Mirna Ge, PhD,LP and Alem Holguin PsyD,LP  April 20: Guest Speaker: Health , Adriana Garner, [facility],CHES, CPT  May 18: Guest Speaker: Dietitian, Samuel Pascual, ANTHONY, LP  Sharla 15: \"Let's Talk\" a time " "for the group to share.  July 20: \"Let's Talk\" a time for the group to share.  August 17: TBA  September 21: TBA  October 19: Guest Speaker: Dr Giovani Muller MD Pulmonologist and Sleep Medicine Physician, \"Getting a Good Night's Sleep\".  November 16: TBA  December 21: TBA    [facility] and [facility] Support Groups    Connections: Bariatric Care Support Group?  This is open to all [facility] (and those external to this program) pre- and post- operative bariatric surgery patients as well as their support system.   WHEN: This group meets the 2nd Tuesday of each month from 6:30 PM - 8:00 PM virtually using Microsoft Teams.   FACILITATOR: Led by Jeremy Muñoz, Ph.D who is a Licensed Psychologist with the [facility] Comprehensive Weight Management Program.   TO REGISTER: Please send an email to Jeremy Muñoz, Ph.D., LP at?[email or web address]?if you would like an invitation to the group and to learn about using Microsoft Teams.    2022 Meetings  January 11: Shirley Duncan, PharmD, Pharmacy Resident at [facility], \"Medications and Bariatric Surgery\".  February 8: Open Forum  March 8: Antoine Natarajan MD, \"Exercise and Bariatric Surgery\".  April 12  May 10  Sharla 14    Connections: Post-Operative Bariatric Surgery Support Group  This is a support group for [facility] bariatric patients (and those external to [facility]) who have had bariatric surgery and are at least 3 months post-surgery.  WHEN: This support group meets the 4th Wednesday of the month from 11:00 AM - 12:00 PM virtually using Microsoft Teams.   FACILITATOR: Led by Certified Bariatric Nurse, Ester Jacinto RN.   TO REGISTER: Please send an email to Ester at [email or web address] if you would like an invitation to the group and to learn about using Microsoft Teams.    2022 Meetings  January 26  February 23  March 23  April 27  May 25  Sharla 22    [facility]" "Center Healthy Lifestyle Virtual Support Group    Healthy Lifestyle Virtual Support Group?  This is 60 minutes of small group guided discussion, support and resources. All are welcome who want a healthy lifestyle.  WHEN: This group meets monthly on a Friday from 12:30 PM - 1:30 PM virtually using Microsoft Teams.   FACILITATOR: Led by National Board Certified Health and , Ester Byrd [facility].   TO REGISTER: Please send an email to Ester [email or web address] to receive monthly invites to the group or if you have any questions about having a health .  Prior to the meeting, a link with directions on how to join the meeting will be sent to you.    2022 Meetings  January 21: Adilia Harding MS, RN, CIC, CBN, \"Healthy Habits\"  February 25: Open Forum  March 18: \"Setting Limits and Boundaries\"  April 29: Chyna Young RD, \"Meal Planning Made Easy\"  May 20: Open Forum  Sharla: To be determined  ____________________________________________________________________________________________________________________________________________________________________________  [facility] Get Moving Program  Our team of physical therapists is trained to help you understand and take control of your condition. They will perform a thorough evaluation to determine your ability for activity and develop a customized plan to fit your goals and physical ability.  Scheduling: Unsure if the Get Moving program is right for you? Discuss the program with your medical provider or diabetes educator. You can also call us at [phone] to ask questions or schedule an appointment.   [facility] Get Moving Program  ____________________________________________________________________________________________________________________________________________________________________________  [facility] Diabetes Prevention Program (DPP)  If you have prediabetes and Medicare please contact us via Emmahart to learn more " about the Diabetes Prevention Program (DPP)  Program Details:  [facility] offers the year-long Diabetes Prevention Program (DPP). The program helps you to make lifestyle changes that prevent or delay type 2 diabetes by supporting healthy eating, increased physical activity, stress reduction and use of coping skills.   On average, previous [facility] DPP cohorts have lost and maintained at least 5% of their starting weight throughout the program and averaged more than 150 minutes of physical activity per week.  Participants meet weekly for one-hour group sessions over sixteen weeks, every other week for the next 8 weeks, and monthly for the last six months.   A year-long maintenance program is also available for participants who complete the first year.   Location & Cost:   During the COVID-19 Public Health Emergency, the program is offered virtually. When in-person classes can resume, they will be held at [facility].  For people with Medicare, the program is covered in full. A self-pay option will also be available for those with non-Medicare insurance plans.   _________________________________________________________________________________________________________________________________________________________  Bluetooth Scale:    We hope to provide you with high quality virtual healthcare visits while social distancing for COVID-19 is necessary, as well as in the future when virtual visits may be more convenient for you.     Our technology team made it possible for Bluetooth scales to send weight measurements to our electronic medical record. This allows weights from you weighing at home to securely flow into the medical record, which will improve telephone and virtual visits.   Additionally, studies have shown that adults actually lose more weight when their weights are automatically sent to someone else, and also that this process is not stressful for those  adults.    Below is a link for purchasing the scale, with a discount code for our patients. You may call your insurance company to see if they will reimburse you for the cost of the scale, as a piece of durable medical equipment. The scales only go up to a weight of 400 pounds. This is an issue and we are working with the developer on increasing this. We found no scales that go over 400lb that have blue-tooth for connecting to [employer].    Scale to purchase: the m[employer]  Body  Scale: [email or web address]/body/[email or web address]=aw.ds    Discount Code: We have a discount code for our patients to bring the cost down to $50, Discount code is: [place]_Scale_20%off  _______________________________________________________________________________________________________________________________________________________________________________    To work with a Behavioral Health Psychologist:    Call to schedule:    Sebastián De La Cruz - [phone]  Hayde Mcclendon - [phone]  Ambika Perdomo - [phone]  Anitra Nielson - [phone]   Chely Woodall PhD (cannot accept Medicare) [phone]        Thank you,   [facility] Comprehensive Weight Management Team

## 2022-03-24 NOTE — LETTER
3/24/2022       RE: Jonathon Broussard  [address]  [address]  [facility] [address]     Dear Colleague,    Thank you for referring your patient, Jonathon Broussard, to the [facility]NAG[facility]IC [facility]. Please see a copy of my visit note below.    Jonathon Broussard is a 41 year old year old who is being evaluated via a billable telephone visit.      What phone number would you like to be contacted at? [phone]  How would you like to obtain your AVS? Juaquin Menard NREMT        Call 7 min in length  12 minutes spent on the date of the encounter doing chart review, history and exam, documentation and further activities per the note    Return Medical Weight Management Note     Jonathon Broussard  MRN:  [id number]  :  1980  PO:  3/24/2022    Dear KRAIG Jane,    I had the pleasure of seeing your patient Jonathon Broussard. She is a 41 year old female who I am continuing to see for treatment of obesity related to:       2020   I have the following health issues associated with obesity: High Blood Pressure, Sleep Apnea, GERD (Reflux), Asthma   I have the following symptoms associated with obesity: None of the above       Assessment & Plan   Problem List Items Addressed This Visit        Digestive    Morbid obesity (H)     Down 6lb since last seen. Increased stress may have caused decreased appetite. Has been working on limiting portions and increasing hydration.     Plan:  Continue metformin  Goal is 64oz water daily   Short walk daily, increase as able                   INTERVAL HISTORY:  Last seen 22- was going to try protein shakes.     Today-  Protein shakes too expensive   Decreased hunger since seen  Working on decreasing intake   Working on increasing fluids   Increased stress   Waiting for insoles for plantar fascitis     CURRENT WEIGHT:   252 lbs 3.2 oz                  Changes and Difficulties  3/24/2022   I have made the following changes to my diet since my last visit: Haven't been eating as much.   With regards to my diet, I am still struggling with: Choking on food. Scheduled to see specialist.   I have made the following changes to my activity/exercise since my last visit: Still walking.   With regards to my activity/exercise, I am still struggling with: Plantar fasciitis.         MEDICATIONS:   Current Outpatient Medications   Medication Sig Dispense Refill     ACETAMINOPHEN EXTRA STRENGTH 500 MG tablet TAKE 1 TAB BY MOUTH THREE TIMES DAILY       amLODIPine (NORVASC) 10 MG tablet TAKE 1 TABLET BY MOUTH EVERY DAY DX HIGH BLOOD PRESSURE  11     Ascorbic Acid (VITAMIN C) 100 MG CHEW        atomoxetine (STRATTERA) 100 MG capsule One cap QAM (with one 25mg cap for total of 125mg QAM). Take after food.       atomoxetine (STRATTERA) 25 MG capsule One cap QAM (with one 100mg cap for total of 125mg QAM). Take after food.       azelastine (OPTIVAR) 0.05 % ophthalmic solution INSTILL 1 DROP INTO BOTH EYES TWICE DAILY       benzoyl peroxide 5 % external liquid        benztropine (COGENTIN) 0.5 MG tablet Take 0.5 mg by mouth       Calcium Polycarbophil (FIBER) 625 MG tablet TAKE 2 TABS (1,250mg) BY MOUTH TWICE DAILY       carvedilol (COREG) 12.5 MG tablet TAKE 3 TABLETS (37.5mg) BY MOUTH TWICE DAILY DX HIGH BLOOD PRESSURE  11     cholecalciferol (VITAMIN D-1000 MAX ST) 1000 units TABS Take 1 tablet by mouth every 24 hours       clindamycin (CLEOCIN T) 1 % external lotion        cloZAPine (CLOZARIL) 100 MG tablet 400mg QHS. (4 tabs QHS)       cyclobenzaprine (FLEXERIL) 10 MG tablet Take 1 tablet (10 mg) by mouth 3 times daily 90 tablet 3     diazepam (VALIUM) 10 MG tablet TAKE 1 TAB BY MOUTH THREE TIMES DAILY.IF TOO SEDATING ,CAN TAKE 1 2 TAB (5MG)  4     diclofenac (VOLTAREN) 1 % topical gel APPLY A PEA SIZED AMOUNT TO AREAS ON FEET THAT ARE PAINFUL UP TO 4 TIMES DAILY AS NEEDED FOR PAIN.       diphenhydrAMINE  HCl 50 MG TABS One tab QID prn.       docusate sodium (DSS) 100 MG capsule Take 100 mg by mouth       escitalopram (LEXAPRO) 20 MG tablet TAKE 1 TAB BY MOUTH ONCE DAILY AFTER EATING       fluticasone (FLOVENT HFA) 110 MCG/ACT inhaler Inhale 110 mcg into the lungs       GABAPENTIN PO Take 1,200 mg by mouth 2 times daily Two tablets twice daily       hydrocortisone (CORTAID) 1 % external cream        ketoconazole (NIZORAL) 2 % external cream        LACTASE ENZYME 3000 units tablet TAKE 1 TAB BY MOUTH THREE TIMES DAILY AS NEEDED WITH FIRST BITE OF DAIRY CONTAINING FOOD       LANsoprazole (PREVACID) 30 MG DR capsule TAKE 1 CAP BY MOUTH ONCE DAILY 30 MINS PRIOR TO FIRST MEAL OF THE DAY 90 capsule 3     levothyroxine (SYNTHROID/LEVOTHROID) 100 MCG tablet TAKE 1 TAB BY MOUTH ON AN EMPTY STOMACH AT LEAST 30 MINS PRIOR TO FOOD OR MEDS       levothyroxine (SYNTHROID/LEVOTHROID) 88 MCG tablet TAKE 1 TAB BY MOUTH ONCE DAILY ON AN EMPTY STOMACH AND 30 MINUTES PRIOR TO OTHER MEDICATIONS       LEVOTHYROXINE SODIUM PO Take 112 mcg by mouth       lisinopril (PRINIVIL/ZESTRIL) 5 MG tablet TAKE 1 TAB BY MOUTH ONCE DAILY  11     lithium (ESKALITH) 600 MG capsule Take 1,200 mg by mouth daily        LORazepam (ATIVAN) 2 MG tablet TAKE 1 TAB BY MOUTH THREE TIMES DAILY  4     Lubricants (SURGICAL LUBRICANT) external gel        LUBRICATING PLUS EYE DROPS 0.5 % SOLN ophthalmic solution INSTILL 1 DROP INTO EACH EYE FOUR TIMES DAILY  11     lurasidone (LATUDA) 40 MG TABS tablet Take 120 mg by mouth daily with food        magnesium citrate 1.745 GM/30ML solution Take 296 mLs by mouth once       melatonin 5 MG sublingual tablet TAKE 1 TAB BY MOUTH AT BEDTIME       melatonin 5 MG tablet Take 5 mg by mouth       metFORMIN (GLUCOPHAGE-XR) 500 MG 24 hr tablet Take 2 tablets (1,000 mg) by mouth 2 times daily (with meals) 120 tablet 2     Multiple Vitamin (DAILY-KONSTANTIN) TABS Take 1 tablet by mouth At Bedtime       Multiple Vitamins-Minerals (CERTA-KONSTANTIN  PO)        MYRBETRIQ 25 MG 24 hr tablet TAKE 1 TAB BY MOUTH ONCE DAILY       nicotine polacrilex (NICORETTE) 4 MG gum        nystatin-triamcinolone (MYCOLOG) 144322-0.1 UNIT/GM-% external ointment APPLY TO AFFECTED AREA(S) ON LABIA TWICE DAILY FOR ONE WEEK FOR CANDIDA       olopatadine (PATANOL) 0.1 % ophthalmic solution INSTILL 1 DROP TO BOTH EYES TWICE A DAY  5     oxybutynin (DITROPAN-XL) 10 MG 24 hr tablet TAKE 1 TAB BY MOUTH ONCE DAILY  99     Paliperidone (INVEGA PO) Take 9 mg by mouth       paliperidone (INVEGA SUSTENNA) 234 MG/1.5ML SUSP Inject 234 mg into the muscle       PARoxetine (PAXIL) 20 MG tablet TAKE 1/2 TAB (10MG) BY MOUTH DAILY FOR 1 WEEK TAKE WITH FOOD;TAKE 1 TAB BY MOUTH DAILY TAKE WITH FOOD       polyethylene glycol (MIRALAX) powder Take 17 g (1 capful) by mouth daily 510 g 11     simethicone (MYLICON) 125 MG chewable tablet Take 125 mg by mouth 2 times daily       SM MICONAZOLE 7 2 % cream INSERT 1 APPLICATORFUL VAGINALLY AT BEDTIME FOR 7 DAYS       SM NICOTINE POLACRILEX 2 MG gum CHEW 1 PIECE EVERY HOUR AS NEEDED FOR NICOTINE CRAVINGS  16     Specialty Vitamins Products (VITAMINS FOR HAIR) CAPS Take 1 tablet by mouth daily       topiramate (TOPAMAX) 25 MG tablet Take 3 tablets (75 mg) by mouth 2 times daily 180 tablet 3     triamcinolone (KENALOG) 0.025 % cream APPLY TWICE DAILY TO RASH ON WRIST AND UNDER ARMPITS  1     triamcinolone (KENALOG) 0.1 % external cream APPLY TOPICALLY TO BOTH HANDS TWICE DAILY AS NEEDED FOR 2 WEEKS AT A TIME       valACYclovir (VALTREX) 500 MG tablet TAKE 1 TAB BY MOUTH DAILY       VENLAFAXINE HCL PO Take 375 mg by mouth 2 times daily ER       VITAMIN D, CHOLECALCIFEROL, PO Take 2,000 Units by mouth daily          Weight Loss Medication History Reviewed With Patient 3/24/2022   Which weight loss medications are you currently taking on a regular basis?  Topamax (topiramate)   Are you having any side effects from the weight loss medication that we have prescribed  you? No   If you are having side effects please describe: -       Orders Only on 07/23/2018   Component Date Value Ref Range Status     Specimen Description 07/23/2018 Feces   Final     C Diff Toxin B PCR 07/23/2018 Positive (A) NEG^Negative Final    Comment: Positive: Toxin producing Clostridium difficile target DNA sequences detected,   presumed positive for Clostridium difficile toxin B.  Clostridium difficile (Requires Enteric Isolation)  FDA approved assay performed using [employer] Gene[employer]pert real-time PCR.           PHYSICAL EXAM:  Objective    Wt 114.4 kg (252 lb 3.2 oz)   BMI 44.68 kg/m             Vitals:  No vitals were obtained today due to virtual visit.    GENERAL: Healthy, alert and no distress  EYES: Eyes grossly normal to inspection.  No discharge or erythema, or obvious scleral/conjunctival abnormalities.  RESP: No audible wheeze, cough, or visible cyanosis.  No visible retractions or increased work of breathing.    SKIN: Visible skin clear. No significant rash, abnormal pigmentation or lesions.  NEURO: Cranial nerves grossly intact.  Mentation and speech appropriate for age.  PSYCH: Mentation appears normal, affect normal/bright, judgement and insight intact, normal speech and appearance well-groomed.        Sincerely,    Jessica Contreras NP

## 2022-04-01 ENCOUNTER — TRANSCRIBE ORDERS (OUTPATIENT)
Dept: OTHER | Age: 42
End: 2022-04-01
Payer: COMMERCIAL

## 2022-04-01 DIAGNOSIS — R11.10 POSTPRANDIAL VOMITING: Primary | ICD-10-CM

## 2022-04-13 ENCOUNTER — TELEPHONE (OUTPATIENT)
Dept: GASTROENTEROLOGY | Facility: CLINIC | Age: 42
End: 2022-04-13
Payer: COMMERCIAL

## 2022-04-13 DIAGNOSIS — Z11.59 ENCOUNTER FOR SCREENING FOR OTHER VIRAL DISEASES: Primary | ICD-10-CM

## 2022-04-13 NOTE — TELEPHONE ENCOUNTER
Screening Questions  BlueKIND OF PREP RedLOCATION [review exclusion criteria] GreenSEDATION TYPE  1. Have you had a positive covid test in the last 90 days? n     2. Do you have a legal guardian or medical Power of ?  Are you able to give consent for your medical care?y (Sedation review/consideration needed)    3. Are you active on mychart? n    4. What insurance is in the chart? [facility]     3.   Ordering/Referring Provider: Owen    4. BMI45.2 [BMI OVER 40-EXTENDED PREP]  If greater than 40 review exclusion criteria [PAC APPT IF @ [facility]]        5.  Respiratory Screening :  [If yes to any of the following HOSPITAL setting only]     Do you use daily home oxygen? n    Do you have mod to severe Obstructive Sleep Apnea? n  [OKAY @ [facility] SH PH RI]   Do you have Pulmonary Hypertension? n     Do you have UNCONTROLLED asthma? n        6.   Have you had a heart or lung transplant? n      7.   Are you currently on dialysis? nn [ If yes, G-PREP & HOSPITAL setting only]     8.   Do you have chronic kidney disease? n [ If yes, G-PREP ]    9.   Have you had a stroke or Transient ischemic attack (TIA - aka  mini stroke ) within 6 months?  n (If yes, please review exclusion criteria)    10.   In the past 6 months, have you had any heart related issues including cardiomyopathy or heart attack? n           If yes, did it require cardiac stenting or other implantable device? n      11.   Do you have any implantable devices in your body (pacemaker, defib, LVAD)? n (If yes, please review exclusion criteria)    12.   Do you take nitroglycerin? n           If yes, how often? n  (if yes, HOSPITAL setting ONLY)    13.   Are you currently taking any blood thinners? n           [IF YES, INFORM PATIENT TO FOLLOW UP W/ ORDERING PROVIDER FOR BRIDGING INSTRUCTIONS]     14.   Do you have a diagnosis of diabetes? n   [ If yes, G-PREP ]    15.   [FEMALES] Are you currently pregnant? n    If yes, how many weeks? n    16.   Are you taking  any prescription pain medications on a routine schedule?  n  [ If yes, EXTENDED PREP.] [If yes, MAC]    17.   Do you have any chemical dependencies such as alcohol, street drugs, or methadone?  n [If yes, MAC]    18.   Do you have any history of post-traumatic stress syndrome, severe anxiety or history of psychosis?  anxiety  [If yes, MAC]    19.   Do you transfer independently?  y    20.  On a regular basis do you go 3-5 days between bowel movements? n   [ If yes, EXTENDED PREP.]    21.   Preferred LOCAL Pharmacy for Pre Prescription      [facility] - [place], [place] - [address] [place] [address] ONLY [id number] - [place], [place] - [address] [facility]      Scheduling Details      Caller : Jonathon Broussard  (Please ask for phone number if not scheduled by patient)    Type of Procedure Scheduled: EGD  Which Colonoscopy Prep was Sent?:   JEIMY CF PATIENTS & GROEN'S PATIENTS NEEDS EXTENDED PREP  Surgeon:Petty  Date of Procedure: 4/21  [place] [facility] [place] Type: MAC  Conscious Sedation- Needs  for 6 hours after the procedure  MAC/General-Needs  for 24 hours after procedure    Pre-op Required at [facility], [place], [place] and OR for MAC sedation: n  (advise patient they will need a pre-op prior to procedure -)      Informed patient they will need an adult  y  Cannot take any type of public or medical transportation alone    Pre-Procedure Covid test to be completed at [facility] or Externally: y    Confirmed Nurse will call to complete assessment y    Additional comments:

## 2022-04-14 ENCOUNTER — TELEPHONE (OUTPATIENT)
Dept: GASTROENTEROLOGY | Facility: CLINIC | Age: 42
End: 2022-04-14

## 2022-04-14 NOTE — TELEPHONE ENCOUNTER
Patient scheduled for EGD on 4/21/22.     Covid test scheduled: 4/18/22    Arrival time: 0740    Facility location: [facility]    Sedation type: MAC     Indication for procedure: postprandial vomiting     Anticoagulations? no     Pre visit planning completed.    Grace Cheung RN

## 2022-04-14 NOTE — TELEPHONE ENCOUNTER
Pre assessment questions completed for upcoming EGD procedure scheduled on 4/21/22    COVID test scheduled 4/18/22    Reviewed procedural arrival time 0740 and facility location [facility].    Designated  policy reviewed. Instructed to have someone stay 24 hours post procedure.     Reviewed EGD prep instructions with patient. NPO six hours prior to procedure.     Patient verbalized understanding and had no questions or concerns at this time.    Grace Cheung RN

## 2022-04-18 ENCOUNTER — LAB (OUTPATIENT)
Dept: LAB | Facility: CLINIC | Age: 42
End: 2022-04-18
Payer: COMMERCIAL

## 2022-04-18 DIAGNOSIS — Z11.59 ENCOUNTER FOR SCREENING FOR OTHER VIRAL DISEASES: ICD-10-CM

## 2022-04-18 PROCEDURE — U0003 INFECTIOUS AGENT DETECTION BY NUCLEIC ACID (DNA OR RNA); SEVERE ACUTE RESPIRATORY SYNDROME CORONAVIRUS 2 (SARS-COV-2) (CORONAVIRUS DISEASE [COVID-19]), AMPLIFIED PROBE TECHNIQUE, MAKING USE OF HIGH THROUGHPUT TECHNOLOGIES AS DESCRIBED BY CMS-2020-01-R: HCPCS

## 2022-04-18 PROCEDURE — U0005 INFEC AGEN DETEC AMPLI PROBE: HCPCS

## 2022-04-19 LAB — SARS-COV-2 RNA RESP QL NAA+PROBE: NEGATIVE

## 2022-04-21 ENCOUNTER — HOSPITAL ENCOUNTER (OUTPATIENT)
Facility: AMBULATORY SURGERY CENTER | Age: 42
Discharge: HOME | End: 2022-04-21
Attending: INTERNAL MEDICINE | Admitting: INTERNAL MEDICINE
Payer: COMMERCIAL

## 2022-04-21 ENCOUNTER — ANESTHESIA (OUTPATIENT)
Dept: SURGERY | Facility: AMBULATORY SURGERY CENTER | Age: 42
End: 2022-04-21
Payer: COMMERCIAL

## 2022-04-21 ENCOUNTER — ANESTHESIA EVENT (OUTPATIENT)
Dept: SURGERY | Facility: AMBULATORY SURGERY CENTER | Age: 42
End: 2022-04-21
Payer: COMMERCIAL

## 2022-04-21 VITALS
OXYGEN SATURATION: 96 % | SYSTOLIC BLOOD PRESSURE: 134 MMHG | HEART RATE: 85 BPM | TEMPERATURE: 98.4 F | WEIGHT: 252 LBS | BODY MASS INDEX: 44.65 KG/M2 | DIASTOLIC BLOOD PRESSURE: 85 MMHG | RESPIRATION RATE: 16 BRPM | HEIGHT: 63 IN

## 2022-04-21 VITALS — HEART RATE: 86 BPM

## 2022-04-21 DIAGNOSIS — R12 HEARTBURN: Primary | ICD-10-CM

## 2022-04-21 LAB
HCG UR QL: NEGATIVE
INTERNAL QC OK POCT: NORMAL
POCT KIT EXPIRATION DATE: NORMAL
POCT KIT LOT NUMBER: NORMAL
UPPER GI ENDOSCOPY: NORMAL

## 2022-04-21 PROCEDURE — 88305 TISSUE EXAM BY PATHOLOGIST: CPT | Mod: TC | Performed by: INTERNAL MEDICINE

## 2022-04-21 PROCEDURE — 81025 URINE PREGNANCY TEST: CPT | Performed by: PATHOLOGY

## 2022-04-21 PROCEDURE — 999N000248 HC STATISTIC IV INSERT WITH US BY RN

## 2022-04-21 PROCEDURE — 43239 EGD BIOPSY SINGLE/MULTIPLE: CPT

## 2022-04-21 RX ORDER — FENTANYL CITRATE 50 UG/ML
INJECTION, SOLUTION INTRAMUSCULAR; INTRAVENOUS PRN
Status: DISCONTINUED | OUTPATIENT
Start: 2022-04-21 | End: 2022-04-21 | Stop reason: HOSPADM

## 2022-04-21 RX ORDER — SIMETHICONE
LIQUID (ML) MISCELLANEOUS PRN
Status: DISCONTINUED | OUTPATIENT
Start: 2022-04-21 | End: 2022-04-21 | Stop reason: HOSPADM

## 2022-04-21 RX ORDER — LIDOCAINE 40 MG/G
CREAM TOPICAL
Status: DISCONTINUED | OUTPATIENT
Start: 2022-04-21 | End: 2022-04-22 | Stop reason: HOSPADM

## 2022-04-21 RX ORDER — ONDANSETRON 2 MG/ML
4 INJECTION INTRAMUSCULAR; INTRAVENOUS
Status: DISCONTINUED | OUTPATIENT
Start: 2022-04-21 | End: 2022-04-22 | Stop reason: HOSPADM

## 2022-04-21 NOTE — H&P
Jonathon Broussard  [id number]  female  41 year old      Reason for procedure/surgery: GERD    Patient Active Problem List   Diagnosis     Bipolar I disorder, most recent episode (or current) unspecified     Cannabis dependence in remission (H)     Herpes simplex virus (HSV) infection     Arthritis     Juvenile chronic arthritis (H)     HTN (hypertension)     Heartburn     CARDIOVASCULAR SCREENING; LDL GOAL LESS THAN 130     Gallstones     Morbid obesity (H)       Past Surgical History:    Past Surgical History:   Procedure Laterality Date     HC TOOTH EXTRACTION W/FORCEP  1995     LAPAROSCOPIC CHOLECYSTECTOMY N/A 6/5/2015    Procedure: LAPAROSCOPIC CHOLECYSTECTOMY;  Surgeon: Jacob Hobson MD;  Location: [facility] OR       Past Medical History:   Past Medical History:   Diagnosis Date     ADHD (attention deficit hyperactivity disorder)      Bipolar I disorder, most recent episode (or current) unspecified '98    initially dx as depression, then bipolar     Calculus of gallbladder without mention of cholecystitis or obstruction 5/2015     Cannabis dependence, in remission (H) 3/04    treatment x2, last use 6/06     Fibromyalgia      Gastro-oesophageal reflux disease      HTN (hypertension)      Juvenile idiopathic arthritis (H)      OCD (obsessive compulsive disorder)      Snores      Thyroid disease     hypothyroidism       Social History:   Social History     Tobacco Use     Smoking status: Former Smoker     Years: 8.00     Types: Cigarettes     Start date: 1/1/1998     Quit date: 12/1/2018     Years since quitting: 3.3     Smokeless tobacco: Former User     Tobacco comment: 3/4 ppd/E CIG   Substance Use Topics     Alcohol use: No     Comment: Hx of ETOH       Family History:   Family History   Problem Relation Age of Onset     Heart Disease Mother         MI     Depression Mother      Alcohol/Drug Father      Cerebrovascular Disease Maternal Grandfather      Depression Brother      Depression Sister         Allergies:   Allergies   Allergen Reactions     Haloperidol Tinnitus and Other (See Comments)     Other reaction(s): Dystonia  cogwheeling  stiffness     Olanzapine Visual Disturbance and Other (See Comments)     akathesia  akathesia  Reports increased psychotic episodes     Seasonal Allergies        Active Medications:   Current Outpatient Medications   Medication Sig Dispense Refill     ACETAMINOPHEN EXTRA STRENGTH 500 MG tablet TAKE 1 TAB BY MOUTH THREE TIMES DAILY       amLODIPine (NORVASC) 10 MG tablet TAKE 1 TABLET BY MOUTH EVERY DAY DX HIGH BLOOD PRESSURE  11     Ascorbic Acid (VITAMIN C) 100 MG CHEW        atomoxetine (STRATTERA) 100 MG capsule One cap QAM (with one 25mg cap for total of 125mg QAM). Take after food.       atomoxetine (STRATTERA) 25 MG capsule One cap QAM (with one 100mg cap for total of 125mg QAM). Take after food.       azelastine (OPTIVAR) 0.05 % ophthalmic solution INSTILL 1 DROP INTO BOTH EYES TWICE DAILY       benzoyl peroxide 5 % external liquid        benztropine (COGENTIN) 0.5 MG tablet Take 0.5 mg by mouth       Calcium Polycarbophil (FIBER) 625 MG tablet TAKE 2 TABS (1,250mg) BY MOUTH TWICE DAILY       carvedilol (COREG) 12.5 MG tablet TAKE 3 TABLETS (37.5mg) BY MOUTH TWICE DAILY DX HIGH BLOOD PRESSURE  11     cholecalciferol 25 MCG (1000 UT) TABS Take 1 tablet by mouth every 24 hours       clindamycin (CLEOCIN T) 1 % external lotion        cloZAPine (CLOZARIL) 100 MG tablet 400mg QHS. (4 tabs QHS)       cyclobenzaprine (FLEXERIL) 10 MG tablet Take 1 tablet (10 mg) by mouth 3 times daily 90 tablet 3     diazepam (VALIUM) 10 MG tablet TAKE 1 TAB BY MOUTH THREE TIMES DAILY.IF TOO SEDATING ,CAN TAKE 1 2 TAB (5MG)  4     diclofenac (VOLTAREN) 1 % topical gel APPLY A PEA SIZED AMOUNT TO AREAS ON FEET THAT ARE PAINFUL UP TO 4 TIMES DAILY AS NEEDED FOR PAIN.       diphenhydrAMINE HCl 50 MG TABS One tab QID prn.       docusate sodium (COLACE) 100 MG capsule Take 100 mg by mouth        escitalopram (LEXAPRO) 20 MG tablet TAKE 1 TAB BY MOUTH ONCE DAILY AFTER EATING       fluticasone (FLOVENT HFA) 110 MCG/ACT inhaler Inhale 110 mcg into the lungs       GABAPENTIN PO Take 1,200 mg by mouth 2 times daily Two tablets twice daily       hydrocortisone (CORTAID) 1 % external cream        ketoconazole (NIZORAL) 2 % external cream        LACTASE ENZYME 3000 units tablet TAKE 1 TAB BY MOUTH THREE TIMES DAILY AS NEEDED WITH FIRST BITE OF DAIRY CONTAINING FOOD       LANsoprazole (PREVACID) 30 MG DR capsule TAKE 1 CAP BY MOUTH ONCE DAILY 30 MINS PRIOR TO FIRST MEAL OF THE DAY 90 capsule 3     levothyroxine (SYNTHROID/LEVOTHROID) 100 MCG tablet TAKE 1 TAB BY MOUTH ON AN EMPTY STOMACH AT LEAST 30 MINS PRIOR TO FOOD OR MEDS       lisinopril (PRINIVIL/ZESTRIL) 5 MG tablet TAKE 1 TAB BY MOUTH ONCE DAILY  11     lithium (ESKALITH) 600 MG capsule Take 1,200 mg by mouth daily        LORazepam (ATIVAN) 2 MG tablet TAKE 1 TAB BY MOUTH THREE TIMES DAILY  4     Lubricants (SURGICAL LUBRICANT) external gel        LUBRICATING PLUS EYE DROPS 0.5 % SOLN ophthalmic solution INSTILL 1 DROP INTO EACH EYE FOUR TIMES DAILY  11     lurasidone (LATUDA) 40 MG TABS tablet Take 120 mg by mouth daily with food        magnesium citrate 1.745 GM/30ML solution Take 296 mLs by mouth once       melatonin 5 MG sublingual tablet TAKE 1 TAB BY MOUTH AT BEDTIME       metFORMIN (GLUCOPHAGE-XR) 500 MG 24 hr tablet Take 2 tablets (1,000 mg) by mouth 2 times daily (with meals) 120 tablet 2     Multiple Vitamin (DAILY-KONSTANTIN) TABS Take 1 tablet by mouth At Bedtime       Multiple Vitamins-Minerals (CERTA-KONSTANTIN PO)        MYRBETRIQ 25 MG 24 hr tablet TAKE 1 TAB BY MOUTH ONCE DAILY       nicotine polacrilex (NICORETTE) 4 MG gum        nystatin-triamcinolone (MYCOLOG) 873073-9.1 UNIT/GM-% external ointment APPLY TO AFFECTED AREA(S) ON LABIA TWICE DAILY FOR ONE WEEK FOR CANDIDA       olopatadine (PATANOL) 0.1 % ophthalmic solution INSTILL 1 DROP TO BOTH EYES  "TWICE A DAY  5     oxybutynin (DITROPAN-XL) 10 MG 24 hr tablet TAKE 1 TAB BY MOUTH ONCE DAILY  99     Paliperidone (INVEGA PO) Take 9 mg by mouth       paliperidone (INVEGA SUSTENNA) 234 MG/1.5ML SUSP Inject 234 mg into the muscle       PARoxetine (PAXIL) 20 MG tablet TAKE 1/2 TAB (10MG) BY MOUTH DAILY FOR 1 WEEK TAKE WITH FOOD;TAKE 1 TAB BY MOUTH DAILY TAKE WITH FOOD       polyethylene glycol (MIRALAX) powder Take 17 g (1 capful) by mouth daily 510 g 11     simethicone (MYLICON) 125 MG chewable tablet Take 125 mg by mouth 2 times daily       SM NICOTINE POLACRILEX 2 MG gum CHEW 1 PIECE EVERY HOUR AS NEEDED FOR NICOTINE CRAVINGS  16     Specialty Vitamins Products (VITAMINS FOR HAIR) CAPS Take 1 tablet by mouth daily       topiramate (TOPAMAX) 25 MG tablet Take 3 tablets (75 mg) by mouth 2 times daily 180 tablet 3     triamcinolone (KENALOG) 0.025 % cream APPLY TWICE DAILY TO RASH ON WRIST AND UNDER ARMPITS  1     triamcinolone (KENALOG) 0.1 % external cream APPLY TOPICALLY TO BOTH HANDS TWICE DAILY AS NEEDED FOR 2 WEEKS AT A TIME       valACYclovir (VALTREX) 500 MG tablet TAKE 1 TAB BY MOUTH DAILY       VENLAFAXINE HCL PO Take 375 mg by mouth 2 times daily ER       VITAMIN D, CHOLECALCIFEROL, PO Take 2,000 Units by mouth daily        melatonin 5 MG tablet Take 5 mg by mouth       SM MICONAZOLE 7 2 % cream INSERT 1 APPLICATORFUL VAGINALLY AT BEDTIME FOR 7 DAYS         Systemic Review:   CONSTITUTIONAL: NEGATIVE for fever, chills, change in weight  ENT/MOUTH: NEGATIVE for ear, mouth and throat problems  RESP: NEGATIVE for significant cough or SOB  CV: NEGATIVE for chest pain, palpitations or peripheral edema    Physical Examination:   Vital Signs: BP (!) 126/93   Pulse 82   Temp 98.1  F (36.7  C) (Temporal)   Resp 18   Ht 1.6 m (5' 3\")   Wt 114.3 kg (252 lb)   SpO2 96%   BMI 44.64 kg/m    GENERAL: healthy, alert and no distress  NECK: no adenopathy, no asymmetry, masses, or scars  RESP: lungs clear to " auscultation - no rales, rhonchi or wheezes  CV: regular rate and rhythm, normal S1 S2, no S3 or S4, no murmur, click or rub, no peripheral edema and peripheral pulses strong  ABDOMEN: soft, nontender, no hepatosplenomegaly, no masses and bowel sounds normal  MS: no gross musculoskeletal defects noted, no edema    Plan: Appropriate to proceed as scheduled.      Sina Dove MD  4/21/2022    PCP:  Nicole Bosch

## 2022-04-21 NOTE — LETTER
"April 25, 2022      Jonathon Broussard  [address]    [facility] [address]        Dear ,    We are writing to inform you of your test results.   Your biopsy results are normal.        Resulted Orders   hCG qual urine POCT   Result Value Ref Range    HCG Qual Urine Negative Negative    Internal QC Check POCT Valid Valid    POCT Kit Lot Number [id number]     POCT Kit Expiration Date 08/31/2023    Surgical Pathology Exam   Result Value Ref Range    Case Report       Surgical Pathology Report                         Case: [id number]                                  Authorizing Provider:  Sina Dove Collected:           04/21/2022 09:47 AM                                 MD PETE                                                                        Ordering Location:     [facility] [place] [facility] OR  Received:            04/21/2022 10:02 AM                                 [place]                                                                  Pathologist:           Eyad Damon DO                                                            Specimens:   A) - Small Intestine, Duodenum, Duodenum                                                            B) - Stomach, Body, Gastric                                                                Final Diagnosis       A(1).  DUODENUM, BIOPSY:  - Unremarkable duodenal mucosa  - No evidence of celiac disease    B(2).  GASTRIC, BODY, BIOPSY:  - Unremarkable gastric mucosa  - No H. pylori-like organisms identified on routine staining  - No intestinal metaplasia identified        Clinical Information       Epigastric pain      Gross Description       A(1). Small Intestine, Duodenum, Duodenum:  The specimen is received in formalin with proper patient identification, labeled \"duodenum\".  The specimen consists of 3 pieces of pink-tan soft tissue ranging in size from 0.1 to 0.2 cm in greatest dimension, which are entirely submitted in cassette " "A1.     B(2). Stomach, Body, Gastric:  The specimen is received in formalin with proper patient identification, labeled \"gastric\".  The specimen consists of 3 pieces of pink-tan soft tissue ranging in size from 0.2 to 0.5 cm in greatest dimension, which are entirely submitted in cassette B1.       Microscopic Description       Microscopic examination was performed.        Performing Labs       The technical component of this testing was completed at [facility]      Case Images         If you have any questions or concerns, please call the clinic at the number listed above.       Sincerely,      Sina Dove MD          "

## 2022-04-22 LAB
PATH REPORT.COMMENTS IMP SPEC: NORMAL
PATH REPORT.COMMENTS IMP SPEC: NORMAL
PATH REPORT.FINAL DX SPEC: NORMAL
PATH REPORT.GROSS SPEC: NORMAL
PATH REPORT.MICROSCOPIC SPEC OTHER STN: NORMAL
PATH REPORT.RELEVANT HX SPEC: NORMAL
PHOTO IMAGE: NORMAL

## 2022-04-22 PROCEDURE — 88305 TISSUE EXAM BY PATHOLOGIST: CPT | Mod: 26 | Performed by: PATHOLOGY

## 2022-04-26 DIAGNOSIS — K21.9 GASTROESOPHAGEAL REFLUX DISEASE: ICD-10-CM

## 2022-04-29 RX ORDER — LANSOPRAZOLE 30 MG/1
30 CAPSULE, DELAYED RELEASE ORAL
OUTPATIENT
Start: 2022-04-29

## 2022-04-29 NOTE — TELEPHONE ENCOUNTER
LANSOPRAZOLE 30MG DR CAPSULE      Last Written Prescription Date:  5/10/21  Last Fill Quantity: 90,   # refills: 3  Last Office Visit : 5/12/20  Had EGD 4/21/22  Future Office visit:  6/24/22    Routing refill request to provider for review/approval because:  >18 months since last visit

## 2022-05-04 DIAGNOSIS — K21.9 GASTROESOPHAGEAL REFLUX DISEASE: ICD-10-CM

## 2022-05-04 DIAGNOSIS — E66.01 MORBID OBESITY (H): ICD-10-CM

## 2022-05-04 DIAGNOSIS — R63.5 WEIGHT GAIN DUE TO MEDICATION: ICD-10-CM

## 2022-05-04 DIAGNOSIS — T50.905A WEIGHT GAIN DUE TO MEDICATION: ICD-10-CM

## 2022-05-04 RX ORDER — METFORMIN HCL 500 MG
1000 TABLET, EXTENDED RELEASE 24 HR ORAL 2 TIMES DAILY WITH MEALS
Qty: 120 TABLET | Refills: 1 | Status: SHIPPED | OUTPATIENT
Start: 2022-05-04 | End: 2022-06-28

## 2022-05-04 NOTE — TELEPHONE ENCOUNTER
LANsoprazole (PREVACID) 30 MG    LOV: 5/12/2020  Last Written Prescription Date:  5/10/21  Last Fill Quantity: 90,   # refills: 3 Informed to RTC: Return in about 6 months (around 11/12/2020).  NOV: 6/24/2022 scheduled with Lacy Dunn     Please review and fill if ok.    Thank you,   Maddie Alvarado LPN

## 2022-05-04 NOTE — TELEPHONE ENCOUNTER
Reason for Call:  Medication or medication refill: Medication     Do you use a [facility]?  Name of the pharmacy and phone number for the current request:  [id number] - [place], [place] - [address]  Name of the medication requested: metFORMIN (GLUCOPHAGE-XR) 500 MG 24 hr tablet    Other request: n/a    Can we leave a detailed message on this number? YES    Phone number patient can be reached at: Other phone number:      Best Time: Anytime     Call taken on 5/4/2022 at 11:50 AM by Yoselyn Arreola

## 2022-05-04 NOTE — TELEPHONE ENCOUNTER
[facility]    Phone Message    May a detailed message be left on voicemail: yes     Reason for Call: Medication Refill Request    Has the patient contacted the pharmacy for the refill? Yes   Name of medication being requested: LANsoprazole (PREVACID) 30 MG DR   Provider who prescribed the medication: Aubrey Holden PA-C  Pharmacy: [facility][id number] - [place], [place] - [address]  Date medication is needed: ASAP         Action Taken: Message routed to:  [facility]    Travel Screening: Not Applicable

## 2022-05-04 NOTE — TELEPHONE ENCOUNTER
metFORMIN (GLUCOPHAGE XR) 500 MG 24 hr tablet      Last Written Prescription Date:  1-6-22  Last Fill Quantity: 120,   # refills: 2  Last Office Visit : 3-24-21  Future Office visit:  6-28-22    No hx diabetes: per protocol  A1c not needed    Routing refill request to provider for review/approval because:  Overdue lab Cr-

## 2022-05-11 ENCOUNTER — TELEPHONE (OUTPATIENT)
Dept: GASTROENTEROLOGY | Facility: CLINIC | Age: 42
End: 2022-05-11
Payer: COMMERCIAL

## 2022-05-11 NOTE — TELEPHONE ENCOUNTER
[facility]    Phone Message    May a detailed message be left on voicemail: yes     Reason for Call: Other:      Dariusz from [facility] stated tat the refill requests  for pt's Prevacid prescription should be disregarded., Caller stated that the pt's primary will be taking over prescribing the medication and refills.    Please note for file.    Direct nurse line: [phone]    Action Taken: Message routed to:  [facility] Gastro    Travel Screening: Not Applicable

## 2022-05-19 NOTE — TELEPHONE ENCOUNTER
Addendum  created 05/19/22 131 by Jens Field MD    Clinical Note Signed Called and verified plan.     They would like rx for Miralax sent for patient.

## 2022-05-23 ENCOUNTER — MEDICAL CORRESPONDENCE (OUTPATIENT)
Dept: HEALTH INFORMATION MANAGEMENT | Facility: CLINIC | Age: 42
End: 2022-05-23
Payer: COMMERCIAL

## 2022-06-09 ENCOUNTER — TELEPHONE (OUTPATIENT)
Dept: ENDOCRINOLOGY | Facility: CLINIC | Age: 42
End: 2022-06-09
Payer: COMMERCIAL

## 2022-06-09 DIAGNOSIS — E66.01 MORBID OBESITY (H): ICD-10-CM

## 2022-06-09 RX ORDER — TOPIRAMATE 25 MG/1
75 TABLET, FILM COATED ORAL 2 TIMES DAILY
Qty: 180 TABLET | Refills: 0 | Status: SHIPPED | OUTPATIENT
Start: 2022-06-09 | End: 2022-06-28

## 2022-06-09 NOTE — TELEPHONE ENCOUNTER
Isi, pt's caregiver at [facility], says they, and the pharm, have been trying to get her topiramate refilled. She's out on Sunday. Phar in orig script is confirmed.    Isi: [phone]

## 2022-06-09 NOTE — TELEPHONE ENCOUNTER
Last Clinic Visit: 3/24/2022  [facility] [place]  Recommended 3 month follow up  [place] 6/28/22  Creatinine, chloride, potassium, anion gap care everywhere 5/25/22  Call to Isi, advised prescription sent to requested pharmacy, advised no record of receiving refill request from pharmacy

## 2022-06-13 ENCOUNTER — TELEPHONE (OUTPATIENT)
Dept: GASTROENTEROLOGY | Facility: CLINIC | Age: 42
End: 2022-06-13
Payer: COMMERCIAL

## 2022-06-13 NOTE — TELEPHONE ENCOUNTER
Called to remind patient of their upcoming appointment with our GI clinic, on Friday 6/24/2022 at 10:45AM with Lacy Dunn. This appointment is scheduled as an in-person appt. Please arrive 15 minutes early to check in for your appointment. , if your appointment is virtual (video or telephone) you need to be in [place] for the visit. To reschedule or cancel patient to call [phone].    Ca Snyder MA

## 2022-06-14 ENCOUNTER — TELEPHONE (OUTPATIENT)
Dept: GASTROENTEROLOGY | Facility: CLINIC | Age: 42
End: 2022-06-14
Payer: COMMERCIAL

## 2022-06-14 NOTE — TELEPHONE ENCOUNTER
Called to remind patient of their upcoming appointment with our GI clinic, on Friday 6/24/2022 at 10:45AM with Lacy Dunn. This appointment is scheduled as an in-person appt. Please arrive 15 minutes early to check in for your appointment. , if your appointment is virtual (video or telephone) you need to be in [place] for the visit. To reschedule or cancel patient to call [phone].     Ca Snyder MA

## 2022-06-28 ENCOUNTER — TRANSFERRED RECORDS (OUTPATIENT)
Dept: HEALTH INFORMATION MANAGEMENT | Facility: CLINIC | Age: 42
End: 2022-06-28

## 2022-06-28 ENCOUNTER — MEDICAL CORRESPONDENCE (OUTPATIENT)
Dept: HEALTH INFORMATION MANAGEMENT | Facility: CLINIC | Age: 42
End: 2022-06-28

## 2022-06-28 ENCOUNTER — VIRTUAL VISIT (OUTPATIENT)
Dept: ENDOCRINOLOGY | Facility: CLINIC | Age: 42
End: 2022-06-28
Payer: COMMERCIAL

## 2022-06-28 VITALS — BODY MASS INDEX: 42.18 KG/M2 | WEIGHT: 238.1 LBS

## 2022-06-28 DIAGNOSIS — T50.905A WEIGHT GAIN DUE TO MEDICATION: ICD-10-CM

## 2022-06-28 DIAGNOSIS — E66.813 CLASS 3 SEVERE OBESITY WITH SERIOUS COMORBIDITY AND BODY MASS INDEX (BMI) OF 40.0 TO 44.9 IN ADULT, UNSPECIFIED OBESITY TYPE (H): ICD-10-CM

## 2022-06-28 DIAGNOSIS — E66.01 CLASS 3 SEVERE OBESITY WITH SERIOUS COMORBIDITY AND BODY MASS INDEX (BMI) OF 40.0 TO 44.9 IN ADULT, UNSPECIFIED OBESITY TYPE (H): ICD-10-CM

## 2022-06-28 DIAGNOSIS — E66.01 MORBID OBESITY (H): ICD-10-CM

## 2022-06-28 DIAGNOSIS — R63.5 WEIGHT GAIN DUE TO MEDICATION: ICD-10-CM

## 2022-06-28 PROCEDURE — 99213 OFFICE O/P EST LOW 20 MIN: CPT | Mod: TEL | Performed by: NURSE PRACTITIONER

## 2022-06-28 RX ORDER — TOPIRAMATE 25 MG/1
75 TABLET, FILM COATED ORAL 2 TIMES DAILY
Qty: 180 TABLET | Refills: 3 | Status: SHIPPED | OUTPATIENT
Start: 2022-06-28

## 2022-06-28 RX ORDER — ARIPIPRAZOLE 300 MG
KIT INTRAMUSCULAR
COMMUNITY
Start: 2022-06-23

## 2022-06-28 RX ORDER — METFORMIN HCL 500 MG
1000 TABLET, EXTENDED RELEASE 24 HR ORAL 2 TIMES DAILY WITH MEALS
Qty: 120 TABLET | Refills: 3 | Status: SHIPPED | OUTPATIENT
Start: 2022-06-28

## 2022-06-28 NOTE — PROGRESS NOTES
Total call time 10 min.   26 minutes spent on the date of the encounter doing chart review, history and exam, documentation and further activities per the note    Return Medical Weight Management Note     Jonathon Broussard  MRN:  [id number]  :  1980  PO:  2022    Dear KRAIG Jane,    I had the pleasure of seeing your patient Jonathon Broussard. She is a 42 year old female who I am continuing to see for treatment of obesity related to:       2020   I have the following health issues associated with obesity: High Blood Pressure, Sleep Apnea, GERD (Reflux), Asthma   I have the following symptoms associated with obesity: None of the above       Assessment & Plan   Problem List Items Addressed This Visit        Digestive    Class 3 severe obesity with serious comorbidity and body mass index (BMI) of 40.0 to 44.9 in adult (H)     Down 14lb in the last 3 months. Doing protein shake for breakfast and lunch which she is really enjoying. Has friend doing this with her. Some cravings- mostly in the evening- wondering if she can go up to topiramate 100mg twice daily (currently taking 75mg BID). Previously at this dose with increased depression and SI. Has new psychiatrist. Would like her to talk to them before increasing. Discussed strategies to avoid snacking in the evenings.     Plan:  -continue topiramate 75mg twice daily    -let me know if your psychiatrist says it is okay to increase to 100mg twice daily   -continue metformin 1000mg twice daily   -continue protein shakes for breakfast and lunch  -continue with hydration!   -keep up the great work!   -look for someone to walk with  -ideas to do while watching TV   -arts/ crafts like coloring, knitting      -puzzles   -chewing gum   -glass of crystal light   -Follow up 3 months            Relevant Medications    topiramate (TOPAMAX) 25 MG tablet    metFORMIN (GLUCOPHAGE XR) 500 MG 24 hr tablet      Other Visit Diagnoses     Weight gain due to medication         Relevant Medications    metFORMIN (GLUCOPHAGE XR) 500 MG 24 hr tablet             INTERVAL HISTORY:  Last seen 3/2022- continue metformin, push fluids, do short walks while waiting for insoles.     Increased Depression and pain and fatigue. Seen in ED x 2 since last seen.     Today:   Decreased sweets  Doing protein shake in the AM and for lunch   Some hunger when doing shakes   New psychiatrist at [facility] - Akshat Pratt - July 19th   Finding she wants to snack in the evenings while watching tv   New orthotics - hasn't been walking much yet     CURRENT WEIGHT:   238 lbs 1.6 oz                  Changes and Difficulties 6/28/2022   I have made the following changes to my diet since my last visit: Protein shakes diet and cut back on sugar sweets and replaced with fruits.   With regards to my diet, I am still struggling with: Sweet cravings. I don't have them but I would like them.   I have made the following changes to my activity/exercise since my last visit: No changes.   With regards to my activity/exercise, I am still struggling with: Sitting and thinking too much. A little bit of Catatonia.         MEDICATIONS:   Current Outpatient Medications   Medication Sig Dispense Refill     ABILIFY MAINTENA 300 MG extended release inj syringe INJECT 300MG INTRAMUSCULARLY ONCE EVERY 4 WEEKS.       ACETAMINOPHEN EXTRA STRENGTH 500 MG tablet TAKE 1 TAB BY MOUTH THREE TIMES DAILY       amLODIPine (NORVASC) 10 MG tablet TAKE 1 TABLET BY MOUTH EVERY DAY DX HIGH BLOOD PRESSURE  11     Ascorbic Acid (VITAMIN C) 100 MG CHEW        atomoxetine (STRATTERA) 100 MG capsule One cap QAM (with one 25mg cap for total of 125mg QAM). Take after food.       atomoxetine (STRATTERA) 25 MG capsule One cap QAM (with one 100mg cap for total of 125mg QAM). Take after food.       azelastine (OPTIVAR) 0.05 % ophthalmic solution INSTILL 1 DROP INTO BOTH EYES TWICE DAILY       benzoyl peroxide 5 % external liquid        benztropine (COGENTIN) 0.5  MG tablet Take 0.5 mg by mouth       Calcium Polycarbophil (FIBER) 625 MG tablet TAKE 2 TABS (1,250mg) BY MOUTH TWICE DAILY       carvedilol (COREG) 12.5 MG tablet TAKE 3 TABLETS (37.5mg) BY MOUTH TWICE DAILY DX HIGH BLOOD PRESSURE  11     cholecalciferol 25 MCG (1000 UT) TABS Take 1 tablet by mouth every 24 hours       clindamycin (CLEOCIN T) 1 % external lotion        cloZAPine (CLOZARIL) 100 MG tablet 400mg QHS. (4 tabs QHS)       cyclobenzaprine (FLEXERIL) 10 MG tablet Take 1 tablet (10 mg) by mouth 3 times daily 90 tablet 3     diazepam (VALIUM) 10 MG tablet TAKE 1 TAB BY MOUTH THREE TIMES DAILY.IF TOO SEDATING ,CAN TAKE 1 2 TAB (5MG)  4     diclofenac (VOLTAREN) 1 % topical gel APPLY A PEA SIZED AMOUNT TO AREAS ON FEET THAT ARE PAINFUL UP TO 4 TIMES DAILY AS NEEDED FOR PAIN.       diphenhydrAMINE HCl 50 MG TABS One tab QID prn.       docusate sodium (COLACE) 100 MG capsule Take 100 mg by mouth       escitalopram (LEXAPRO) 20 MG tablet TAKE 1 TAB BY MOUTH ONCE DAILY AFTER EATING       fluticasone (FLOVENT HFA) 110 MCG/ACT inhaler Inhale 110 mcg into the lungs       GABAPENTIN PO Take 1,200 mg by mouth 2 times daily Two tablets twice daily       hydrocortisone (CORTAID) 1 % external cream        ketoconazole (NIZORAL) 2 % external cream        LACTASE ENZYME 3000 units tablet TAKE 1 TAB BY MOUTH THREE TIMES DAILY AS NEEDED WITH FIRST BITE OF DAIRY CONTAINING FOOD       LANsoprazole (PREVACID) 30 MG DR capsule TAKE 1 CAP BY MOUTH ONCE DAILY 30 MINS PRIOR TO FIRST MEAL OF THE DAY 90 capsule 3     levothyroxine (SYNTHROID/LEVOTHROID) 100 MCG tablet TAKE 1 TAB BY MOUTH ON AN EMPTY STOMACH AT LEAST 30 MINS PRIOR TO FOOD OR MEDS       lisinopril (PRINIVIL/ZESTRIL) 5 MG tablet TAKE 1 TAB BY MOUTH ONCE DAILY  11     lithium (ESKALITH) 600 MG capsule Take 1,200 mg by mouth daily        LORazepam (ATIVAN) 2 MG tablet TAKE 1 TAB BY MOUTH THREE TIMES DAILY  4     Lubricants (SURGICAL LUBRICANT) external gel         LUBRICATING PLUS EYE DROPS 0.5 % SOLN ophthalmic solution INSTILL 1 DROP INTO EACH EYE FOUR TIMES DAILY  11     lurasidone (LATUDA) 40 MG TABS tablet Take 120 mg by mouth daily with food        magnesium citrate 1.745 GM/30ML solution Take 296 mLs by mouth once       melatonin 5 MG sublingual tablet TAKE 1 TAB BY MOUTH AT BEDTIME       melatonin 5 MG tablet Take 5 mg by mouth       metFORMIN (GLUCOPHAGE XR) 500 MG 24 hr tablet Take 2 tablets (1,000 mg) by mouth 2 times daily (with meals) 120 tablet 3     Multiple Vitamin (DAILY-KONSTANTIN) TABS Take 1 tablet by mouth At Bedtime       Multiple Vitamins-Minerals (CERTA-KONSTANTIN PO)        MYRBETRIQ 25 MG 24 hr tablet TAKE 1 TAB BY MOUTH ONCE DAILY       nicotine polacrilex (NICORETTE) 4 MG gum        nystatin-triamcinolone (MYCOLOG) 685073-2.1 UNIT/GM-% external ointment APPLY TO AFFECTED AREA(S) ON LABIA TWICE DAILY FOR ONE WEEK FOR CANDIDA       olopatadine (PATANOL) 0.1 % ophthalmic solution INSTILL 1 DROP TO BOTH EYES TWICE A DAY  5     oxybutynin (DITROPAN-XL) 10 MG 24 hr tablet TAKE 1 TAB BY MOUTH ONCE DAILY  99     Paliperidone (INVEGA PO) Take 9 mg by mouth       paliperidone (INVEGA SUSTENNA) 234 MG/1.5ML SUSP Inject 234 mg into the muscle       PARoxetine (PAXIL) 20 MG tablet TAKE 1/2 TAB (10MG) BY MOUTH DAILY FOR 1 WEEK TAKE WITH FOOD;TAKE 1 TAB BY MOUTH DAILY TAKE WITH FOOD       polyethylene glycol (MIRALAX) powder Take 17 g (1 capful) by mouth daily 510 g 11     simethicone (MYLICON) 125 MG chewable tablet Take 125 mg by mouth 2 times daily       SM MICONAZOLE 7 2 % cream INSERT 1 APPLICATORFUL VAGINALLY AT BEDTIME FOR 7 DAYS       SM NICOTINE POLACRILEX 2 MG gum CHEW 1 PIECE EVERY HOUR AS NEEDED FOR NICOTINE CRAVINGS  16     Specialty Vitamins Products (VITAMINS FOR HAIR) CAPS Take 1 tablet by mouth daily       topiramate (TOPAMAX) 25 MG tablet Take 3 tablets (75 mg) by mouth 2 times daily 180 tablet 3     triamcinolone (KENALOG) 0.025 % cream APPLY TWICE DAILY  TO RASH ON WRIST AND UNDER ARMPITS  1     triamcinolone (KENALOG) 0.1 % external cream APPLY TOPICALLY TO BOTH HANDS TWICE DAILY AS NEEDED FOR 2 WEEKS AT A TIME       valACYclovir (VALTREX) 500 MG tablet TAKE 1 TAB BY MOUTH DAILY       VENLAFAXINE HCL PO Take 375 mg by mouth 2 times daily ER       VITAMIN D, CHOLECALCIFEROL, PO Take 2,000 Units by mouth daily          Weight Loss Medication History Reviewed With Patient 6/28/2022   Which weight loss medications are you currently taking on a regular basis?  Topamax (topiramate), Metformin   Are you having any side effects from the weight loss medication that we have prescribed you? No   If you are having side effects please describe: -     EGD 4/2022     Impression:            - Normal esophagus.                          - Normal stomach. Biopsied.                          - Duodenal mucosal lymphangiectasia.   Recommendation:        - Await pathology results.                          - Discharge patient to home (ambulatory).                          - Return to referring physician as previously                          scheduled.                                     Hospital Outpatient Visit on 04/21/2022   Component Date Value Ref Range Status     HCG Qual Urine 04/21/2022 Negative  Negative Final     Internal QC Check POCT 04/21/2022 Valid  Valid Final     POCT Kit Lot Number 04/21/2022 [id number]   Final     POCT Kit Expiration Date 04/21/2022 08/31/2023   Final     Case Report 04/21/2022    Final                    Value:Surgical Pathology Report                         Case: [id number]                                  Authorizing Provider:  Sina Dove Collected:           04/21/2022 09:47 AM                                 MD PETE                                                                        Ordering Location:     [facility] OR  Received:            04/21/2022 10:02 AM                                 Peri                                                                   Pathologist:           Eyad Damon DO                                                            Specimens:   A) - Small Intestine, Duodenum, Duodenum                                                            B) - Stomach, Body, Gastric                                                                 Final Diagnosis 04/21/2022    Final                    Value:This result contains rich text formatting which cannot be displayed here.     Clinical Information 04/21/2022    Final                    Value:This result contains rich text formatting which cannot be displayed here.     Gross Description 04/21/2022    Final                    Value:This result contains rich text formatting which cannot be displayed here.     Microscopic Description 04/21/2022    Final                    Value:This result contains rich text formatting which cannot be displayed here.     Performing Labs 04/21/2022    Final                    Value:This result contains rich text formatting which cannot be displayed here.     Upper GI Endoscopy 04/21/2022    Final                    Value:[facility]  [address] [place], [place] [address]., [place] [address] [phone]     Endoscopy Department  _______________________________________________________________________________  Patient Name: Jonathon Broussard         Procedure Date: 4/21/2022 7:32 AM  MRN: [id number]                       Account Number: [id number]  YOB: 1980              Admit Type: Outpatient  Age: 41                               Room: [facility] PROCEDURE ROOM 03  Gender: Female                        Note Status: Finalized  Attending MD: Sina Dove MD  Pause for the Cause: completed  Total Sedation Time:                    _______________________________________________________________________________     Procedure:             Upper GI endoscopy  Indications:           Epigastric abdominal pain  Providers:              Sina Dove MD, Carolyn Galdamez, Mahogany Zhou CRNA, OSEAS CHARLES MD  Patient Profile:       This is a 41 year old female with hx of GERD came f                          or                          EGD to evaluate.  Referring MD:            Medicines:             Midazolam 2 mg IV, Fentanyl 100 micrograms IV,                          Benzocaine spray  Complications:         No immediate complications.  _______________________________________________________________________________  Procedure:             Pre-Anesthesia Assessment:                         - Prior to the procedure, a History and Physical was                          performed, and patient medications and allergies were                          reviewed. The patient is competent. The risks and                          benefits of the procedure and the sedation options and                          risks were discussed with the patient. All questions                          were answered and informed consent was obtained.                          Patient identification and proposed procedure were                          verified by the physician in the procedure room.                                                    Mental Status Examination: alert and oriented. Airway                          Examination: normal oropharyngeal airway and neck                          mobility. Respiratory Examination: clear to                          auscultation. CV Examination: normal. Prophylactic                          Antibiotics: The patient does not require prophylactic                          antibiotics. Prior Anticoagulants: The patient has                          taken no anticoagulant or antiplatelet agents. ASA                          Grade Assessment: II - A patient with mild systemic                          disease. After reviewing the risks and benefits, the                          patient was deemed in  satisfactory condition to                          undergo the procedure. The anesthesia plan was to use                          moderate sedation / analgesia (conscious sedation).                          Immediately prior to administration of medications,                          the                           patient was re-assessed for adequacy to receive                          sedatives. The heart rate, respiratory rate, oxygen                          saturations, blood pressure, adequacy of pulmonary                          ventilation, and response to care were monitored                          throughout the procedure. The physical status of the                          patient was re-assessed after the procedure.                         After obtaining informed consent, the endoscope was                          passed under direct vision. Throughout the procedure,                          the patient's blood pressure, pulse, and oxygen                          saturations were monitored continuously. The Endoscope                          was introduced through the mouth, and advanced to the                          second part of duodenum. The upper GI endoscopy was                          accomplished without difficulty. The patient tolerated                          the pro                          cedure well.                                                                                   Findings:       The examined esophagus was normal.       The entire examined stomach was normal. Biopsies were taken with a cold        forceps for Helicobacter pylori testing.       Lymphangiectasia was present in the duodenal bulb and in the second        portion of the duodenum. Biopsies for histology were taken with a cold        forceps for evaluation of celiac disease.                                                                                   Moderate Sedation:       Moderate (conscious) sedation  was administered by the endoscopy nurse        and supervised by the endoscopist. The patient's oxygen saturation,        heart rate, blood pressure and response to care were monitored. Total        physician intraservice time was 7 minutes.  Impression:            - Normal esophagus.                         - Normal stomach. Biopsied.                         - Duodenal mucosal lymphangi                          ectasia.  Recommendation:        - Await pathology results.                         - Discharge patient to home (ambulatory).                         - Return to referring physician as previously                          scheduled.                                                                                     electronically signed by JULIA Dove  _____________________  Sina Dove MD  4/21/2022 10:04:07 AM  I was physically present for the entire viewing portion of the exam.  __________________________  Signature of teaching physician  Sina Dove MD    ________________  OSEAS CHARLES MD  Number of Addenda: 0    Note Initiated On: 4/21/2022 7:32 AM  Scope In:  Scope Out:           PHYSICAL EXAM:  Objective    Wt 108 kg (238 lb 1.6 oz)   BMI 42.18 kg/m             Vitals:  No vitals were obtained today due to virtual visit.    GENERAL: Healthy, alert and no distress  EYES: Eyes grossly normal to inspection.  No discharge or erythema, or obvious scleral/conjunctival abnormalities.  RESP: No audible wheeze, cough, or visible cyanosis.  No visible retractions or increased work of breathing.    SKIN: Visible skin clear. No significant rash, abnormal pigmentation or lesions.  NEURO: Cranial nerves grossly intact.  Mentation and speech appropriate for age.  PSYCH: Mentation appears normal, affect normal/bright, judgement and insight intact, normal speech and appearance well-groomed.        Sincerely,    Jessica Contreras NP

## 2022-06-28 NOTE — PATIENT INSTRUCTIONS
"Thank you for allowing us the privilege of caring for you. We hope we provided you with the excellent service you deserve.   Please let us know if there is anything else we can do for you so that we can be sure you are completely satisfied with your care experience.    To ensure the quality of our services you may be receiving a patient satisfaction survey from an independent patient satisfaction monitoring company.    The greatest compliment you can give is a \"Likely to Recommend\"    Your visit was with Jessica Contreras NP today.    Instructions per today's visit:     Varinder Broussard, it was great to visit with you today.  Here is a review of our visit.  If our clinic scheduler is not able to reach you please call [phone] to schedule your next appointments.    -continue topiramate 75mg twice daily    -let me know if your psychiatrist says it is okay to increase to 100mg twice daily   -continue metformin 1000mg twice daily   -continue protein shakes for breakfast and lunch  -continue with hydration!   -keep up the great work!   -look for someone to walk with  -ideas to do while watching TV   -arts/ crafts like coloring, knitting      -puzzles   -chewing gum   -glass of crystal light   -Follow up 3 months       Information about [facility] Visits with [facility]: video visit information  _________________________________________________________________________________________________________________________________________________________  If you are asked by your clinic team to have your blood pressure checked:  [facility] do offer several locations for blood pressure checks. Please follow the below link to schedule an appointment. Scheduling an appointment at the pharmacy for a blood pressure check is now preferred.    Appointment Plus " ([email or web address])  _________________________________________________________________________________________________________________________________________________________  Important contact and scheduling information:  Please call our contact center at [phone] to schedule your next appointments.  To find a lab location near you, please call [phone].  For any nursing questions or concerns call Diana Wong LPN at [phone] or Maegan Franco RN at [phone]  Please call during clinic hours Monday through Friday 8:00a - 4:00p if you have questions or you can contact us via [employer] at anytime and we will reply during clinic hours.    Lab results will be communicated through My Chart or letter (if My Chart not used). Please call the clinic if you have not received communication after 1 week or if you have any questions.?  Clinic Fax: [phone]    _________________________________________________________________________________________________________________________________________________________  Meal Replacement Products:    Here is the link to our new e-store where you can purchase our meal replacement products    [facility] E-Store  [facility].[employer]/store    The one week starter kit is a great way to sample a variety of products and see what works for you.    If you want more information about the product go to: Fresh [employer] Meals  [email or web address]    If you are an employee or [facility] or [facility] please contact your care team for a 10% estore discount    Free Shipping for orders over $75     Benefits of meal replacements products:    Portion and calorie control  Improved nutrition  Structured eating  Simplified food choices  Avoid contact with trigger foods  _________________________________________________________________________________________________________________________________________________________  Interested in working with a health  ?  Health coaches work with you to improve your overall health and wellbeing.  They look at the whole person, and may involve discussion of different areas of life, including, but not limited to the four pillars of health (sleep, exercise, nutrition, and stress management). Discuss with your care team if you would like to start working a health .  Health Coaching-3 Pack: Schedule by calling [phone]    $99 for three health coaching visits    Visits may be done in person or via phone    Coaching is a partnership between the  and the client; Coaches do not prescribe or diagnose    Coaching helps inspire the client to reach his/her personal goals   _________________________________________________________________________________________________________________________________________________________  24 Week Healthy Lifestyle Plan:    Our mission in the 24-week Healthy Lifestyle Plan is to provide you with individualized care by giving you the tools, education and support you need to lose weight and maintain a healthy lifestyle. In your 24-week journey, you ll be supported by a dedicated weight loss team that includes registered dietitians, medical weight management providers, health coaches, and nurses -- all with special expertise in weight loss -- to help you every step of the way.     Monthly meetings with your registered dietician or medical weight management provider help to review your progress, update your care plan, and make any adjustments needed to ensure success. Between these visits, weekly and bi-weekly health  visits will help you focus on the four pillars of weight loss -- stress, sleep, nutrition, and exercise -- and how you can best adapt each to achieve sustainable weight loss results.    In addition, you will be given exclusive access to online wellbeing classes through [employer].  Your initial visit will be with a medical weight management provider who will help to  understand your weight loss goals and ensure this program is the right fit for you. Please let our team know if you are interested in the 24 week plan by sending a message to your care team or calling [phone] to schedule.  _________________________________________________________________________________________________________________________________________________________    COMPREHENSIVE WEIGHT MANAGEMENT PROGRAM  VIRTUAL SUPPORT GROUPS    For Support Group Information:      We offer support groups for patients who are working on weight loss and considering, preparing for or have had weight loss surgery.   There is no cost for this opportunity.  You are invited to attend the?Virtual Support Groups?provided by any of the following locations:    [facility] via Microsoft Teams with Do Malhotra RN  2.   [place] via GoGuide with Jeremy Muñoz, PhD, LP  3.   [place] via GoGuide with Ester Jacinto RN  4.   [facility] via Microsoft Teams with Ester Byrd [facility]    The following Support Group information can also be found on our website:  [email or web address]    [facility] Weight Loss Surgery Support Group    [facility] Weight Loss Surgery Support Group  The support group is a patient-lead forum that meets monthly to share experiences, encouragement and education. It is open to those who have had weight loss surgery, are scheduled for surgery, and those who are considering surgery.   WHEN: This group meets on the 3rd Wednesday of each month from 5:00PM - 6:00PM virtually using Microsoft Teams.   FACILITATOR: Led by Do Malhotra, ANTHONY, LD, RN, the program's Clinical Coordinator.   TO REGISTER: Please contact the clinic via [employer]Life or call the nurse line directly at [phone] to inform our staff that you would like an invite sent to you and to let us know the email you would like the invite sent to. Prior  "to the meeting, a link with directions on how to join the meeting will be sent to you.    2022 Meetings  Sharla 15: \"Let's Talk\" a time for the group to share.  July 20: \"Let's Talk\" a time for the group to share.  August 17: \"Let's Talk\" a time for the group to share.  September 21: \"Let's Talk\" a time for the group to share.  October 19: Guest Speaker: Dr Giovani Muller MD Pulmonologist and Sleep Medicine Physician, \"Getting a Good Night's Sleep\".  November 16: \"Let's Talk\" a time for the group to share.  December 21: \"Let's Talk\" a time for the group to share.    [facility] Support Groups    Connections: Bariatric Care Support Group?  This is open to all [facility] (and those external to this program) pre- and post- operative bariatric surgery patients as well as their support system.   WHEN: This group meets the 2nd Tuesday of each month from 6:30 PM - 8:00 PM virtually using Microsoft Teams.   FACILITATOR: Led by Jeremy Muñoz, Ph.D who is a Licensed Psychologist with the [facility] Comprehensive Weight Management Program.   TO REGISTER: Please send an email to Jeremy Muñoz, Ph.D., LP at?[email or web address]?if you would like an invitation to the group and to learn about using Microsoft Teams.    2022 Meetings  June 14: Lacy Lacy RD, LD at [facility], \"Nutritional Labeling\"  July 12 August 2 (Please Note Date Change)  September 13 October 11 November 8 December 13    Connections: Post-Operative Bariatric Surgery Support Group  This is a support group for [facility] bariatric patients (and those external to [facility]) who have had bariatric surgery and are at least 3 months post-surgery.  WHEN: This support group meets the 4th Wednesday of the month from 11:00 AM - 12:00 PM virtually using Microsoft Teams.   FACILITATOR: Led by Certified Bariatric Nurse, Ester Jacinto RN.   TO REGISTER: Please send an email to Ester at " [email or web address] if you would like an invitation to the group and to learn about using Microsoft Teams.    2022 Meetings June 22 July 27 August 24 September 28 October 26 November 23 December 28       [facility] Healthy Lifestyle Virtual Support Group    Healthy Lifestyle Virtual Support Group?  This is 60 minutes of small group guided discussion, support and resources. All are welcome who want a healthy lifestyle.  WHEN: This group meets monthly on a Friday from 12:30 PM - 1:30 PM virtually using Microsoft Teams.   FACILITATOR: Led by National Board Certified Health and , Ester Byrd [facility].   TO REGISTER: Please send an email to Ester [email or web address] to receive monthly invites to the group or if you have any questions about having a health .  Prior to the meeting, a link with directions on how to join the meeting will be sent to you.    2022 Meetings  June 24: Ester Byrd [facility], \"Setting Limits and Boundaries\".  Jul 29: Open Forum  August 26: Guest Speaker: Stephie Villa Registered Dietitian  September 30: Open Forum  October 28th: Guest Speaker: Senia Sage [facility], Health , \"Gratitude Practices\".  November 18: Guest Speaker: Chyna Young RD Registered Dietitian, \"Navigating How to Eat around the Holidays\".  December 16: Guest Speaker: Adriana Garner [facility], \"Changing Your Relationship with Movement\".    ____________________________________________________________________________________________________________________________________________________________________________  [facility] [facility] Get Moving Program  Our team of physical therapists is trained to help you understand and take control of your condition. They will perform a thorough evaluation to determine your ability for activity and develop a customized plan to fit your goals and physical ability.  Scheduling: Unsure if the Get Moving " program is right for you? Discuss the program with your medical provider or diabetes educator. You can also call us at [phone] to ask questions or schedule an appointment.   [facility] Get Moving Program  ____________________________________________________________________________________________________________________________________________________________________________  [facility] Diabetes Prevention Program (DPP)  If you have prediabetes and Medicare please contact us via [employer] to learn more about the Diabetes Prevention Program (DPP)  Program Details:  [facility] offers the year-long Diabetes Prevention Program (DPP). The program helps you to make lifestyle changes that prevent or delay type 2 diabetes by supporting healthy eating, increased physical activity, stress reduction and use of coping skills.   On average, previous [facility] DPP cohorts have lost and maintained at least 5% of their starting weight throughout the program and averaged more than 150 minutes of physical activity per week.  Participants meet weekly for one-hour group sessions over sixteen weeks, every other week for the next 8 weeks, and monthly for the last six months.   A year-long maintenance program is also available for participants who complete the first year.   Location & Cost:   During the COVID-19 Public Health Emergency, the program is offered virtually. When in-person classes can resume, they will be held at [facility].  For people with Medicare, the program is covered in full. A self-pay option will also be available for those with non-Medicare insurance plans.   _________________________________________________________________________________________________________________________________________________________  Bluetooth Scale:    We hope to provide you with high quality virtual healthcare visits while social distancing for COVID-19 is necessary, as well as in the  future when virtual visits may be more convenient for you.     Our technology team made it possible for Bluetooth scales to send weight measurements to our electronic medical record. This allows weights from you weighing at home to securely flow into the medical record, which will improve telephone and virtual visits.   Additionally, studies have shown that adults actually lose more weight when their weights are automatically sent to someone else, and also that this process is not stressful for those adults.    Below is a link for purchasing the scale, with a discount code for our patients. You may call your insurance company to see if they will reimburse you for the cost of the scale, as a piece of durable medical equipment. The scales only go up to a weight of 400 pounds. This is an issue and we are working with the developer on increasing this. We found no scales that go over 400lb that have blue-tooth for connecting to [employer].    Scale to purchase: the Official Limited Virtual  Body  Scale: https://[email or web address][email or web address]/en/body/shop?[email or web address]    Discount Code: We have a discount code for our patients to bring the cost down to $50, Discount code is: [place]_Scale_20%off  _______________________________________________________________________________________________________________________________________________________________________________    To work with a Behavioral Health Psychologist:    Call to schedule:    Sebastián De La Cruz - [phone]  Hayde Mcclendon - [phone]  Ambika Perdomo - [phone]  Anitra Nielson - [phone]   Chely Woodall PhD (cannot accept Medicare) [phone]        Thank you,   [facility] Comprehensive Weight Management Team

## 2022-06-28 NOTE — LETTER
2022       RE: Jonathon Broussard  [address]  [address]  [facility] [address]     Dear Colleague,    Thank you for referring your patient, Jonathon Broussard, to the [facility]. Please see a copy of my visit note below.    Jonathon Broussard is a 42 year old year old who is being evaluated via a billable telephone visit.      What phone number would you like to be contacted at? [phone]  How would you like to obtain your AVS? Mail a copy    Donovan Menard NREMT      Total call time 10 min.   26 minutes spent on the date of the encounter doing chart review, history and exam, documentation and further activities per the note    Return Medical Weight Management Note     Jonathon Broussard  MRN:  [id number]  :  1980  PO:  2022    Dear KRAIG Jane,    I had the pleasure of seeing your patient Jonathon Broussard. She is a 42 year old female who I am continuing to see for treatment of obesity related to:       2020   I have the following health issues associated with obesity: High Blood Pressure, Sleep Apnea, GERD (Reflux), Asthma   I have the following symptoms associated with obesity: None of the above       Assessment & Plan   Problem List Items Addressed This Visit        Digestive    Class 3 severe obesity with serious comorbidity and body mass index (BMI) of 40.0 to 44.9 in adult (H)     Down 14lb in the last 3 months. Doing protein shake for breakfast and lunch which she is really enjoying. Has friend doing this with her. Some cravings- mostly in the evening- wondering if she can go up to topiramate 100mg twice daily (currently taking 75mg BID). Previously at this dose with increased depression and SI. Has new psychiatrist. Would like her to talk to them before increasing. Discussed strategies to avoid snacking in the evenings.     Plan:  -continue topiramate 75mg twice daily    -let  me know if your psychiatrist says it is okay to increase to 100mg twice daily   -continue metformin 1000mg twice daily   -continue protein shakes for breakfast and lunch  -continue with hydration!   -keep up the great work!   -look for someone to walk with  -ideas to do while watching TV   -arts/ crafts like coloring, knitting      -puzzles   -chewing gum   -glass of crystal light   -Follow up 3 months            Relevant Medications    topiramate (TOPAMAX) 25 MG tablet    metFORMIN (GLUCOPHAGE XR) 500 MG 24 hr tablet      Other Visit Diagnoses     Weight gain due to medication        Relevant Medications    metFORMIN (GLUCOPHAGE XR) 500 MG 24 hr tablet             INTERVAL HISTORY:  Last seen 3/2022- continue metformin, push fluids, do short walks while waiting for insoles.     Increased Depression and pain and fatigue. Seen in ED x 2 since last seen.     Today:   Decreased sweets  Doing protein shake in the AM and for lunch   Some hunger when doing shakes   New psychiatrist at [facility] - Akshat Pratt - July 19th   Finding she wants to snack in the evenings while watching tv   New orthotics - hasn't been walking much yet     CURRENT WEIGHT:   238 lbs 1.6 oz                  Changes and Difficulties 6/28/2022   I have made the following changes to my diet since my last visit: Protein shakes diet and cut back on sugar sweets and replaced with fruits.   With regards to my diet, I am still struggling with: Sweet cravings. I don't have them but I would like them.   I have made the following changes to my activity/exercise since my last visit: No changes.   With regards to my activity/exercise, I am still struggling with: Sitting and thinking too much. A little bit of Catatonia.         MEDICATIONS:   Current Outpatient Medications   Medication Sig Dispense Refill     ABILIFY MAINTENA 300 MG extended release inj syringe INJECT 300MG INTRAMUSCULARLY ONCE EVERY 4 WEEKS.       ACETAMINOPHEN EXTRA STRENGTH 500 MG tablet  TAKE 1 TAB BY MOUTH THREE TIMES DAILY       amLODIPine (NORVASC) 10 MG tablet TAKE 1 TABLET BY MOUTH EVERY DAY DX HIGH BLOOD PRESSURE  11     Ascorbic Acid (VITAMIN C) 100 MG CHEW        atomoxetine (STRATTERA) 100 MG capsule One cap QAM (with one 25mg cap for total of 125mg QAM). Take after food.       atomoxetine (STRATTERA) 25 MG capsule One cap QAM (with one 100mg cap for total of 125mg QAM). Take after food.       azelastine (OPTIVAR) 0.05 % ophthalmic solution INSTILL 1 DROP INTO BOTH EYES TWICE DAILY       benzoyl peroxide 5 % external liquid        benztropine (COGENTIN) 0.5 MG tablet Take 0.5 mg by mouth       Calcium Polycarbophil (FIBER) 625 MG tablet TAKE 2 TABS (1,250mg) BY MOUTH TWICE DAILY       carvedilol (COREG) 12.5 MG tablet TAKE 3 TABLETS (37.5mg) BY MOUTH TWICE DAILY DX HIGH BLOOD PRESSURE  11     cholecalciferol 25 MCG (1000 UT) TABS Take 1 tablet by mouth every 24 hours       clindamycin (CLEOCIN T) 1 % external lotion        cloZAPine (CLOZARIL) 100 MG tablet 400mg QHS. (4 tabs QHS)       cyclobenzaprine (FLEXERIL) 10 MG tablet Take 1 tablet (10 mg) by mouth 3 times daily 90 tablet 3     diazepam (VALIUM) 10 MG tablet TAKE 1 TAB BY MOUTH THREE TIMES DAILY.IF TOO SEDATING ,CAN TAKE 1 2 TAB (5MG)  4     diclofenac (VOLTAREN) 1 % topical gel APPLY A PEA SIZED AMOUNT TO AREAS ON FEET THAT ARE PAINFUL UP TO 4 TIMES DAILY AS NEEDED FOR PAIN.       diphenhydrAMINE HCl 50 MG TABS One tab QID prn.       docusate sodium (COLACE) 100 MG capsule Take 100 mg by mouth       escitalopram (LEXAPRO) 20 MG tablet TAKE 1 TAB BY MOUTH ONCE DAILY AFTER EATING       fluticasone (FLOVENT HFA) 110 MCG/ACT inhaler Inhale 110 mcg into the lungs       GABAPENTIN PO Take 1,200 mg by mouth 2 times daily Two tablets twice daily       hydrocortisone (CORTAID) 1 % external cream        ketoconazole (NIZORAL) 2 % external cream        LACTASE ENZYME 3000 units tablet TAKE 1 TAB BY MOUTH THREE TIMES DAILY AS NEEDED WITH FIRST  BITE OF DAIRY CONTAINING FOOD       LANsoprazole (PREVACID) 30 MG DR capsule TAKE 1 CAP BY MOUTH ONCE DAILY 30 MINS PRIOR TO FIRST MEAL OF THE DAY 90 capsule 3     levothyroxine (SYNTHROID/LEVOTHROID) 100 MCG tablet TAKE 1 TAB BY MOUTH ON AN EMPTY STOMACH AT LEAST 30 MINS PRIOR TO FOOD OR MEDS       lisinopril (PRINIVIL/ZESTRIL) 5 MG tablet TAKE 1 TAB BY MOUTH ONCE DAILY  11     lithium (ESKALITH) 600 MG capsule Take 1,200 mg by mouth daily        LORazepam (ATIVAN) 2 MG tablet TAKE 1 TAB BY MOUTH THREE TIMES DAILY  4     Lubricants (SURGICAL LUBRICANT) external gel        LUBRICATING PLUS EYE DROPS 0.5 % SOLN ophthalmic solution INSTILL 1 DROP INTO EACH EYE FOUR TIMES DAILY  11     lurasidone (LATUDA) 40 MG TABS tablet Take 120 mg by mouth daily with food        magnesium citrate 1.745 GM/30ML solution Take 296 mLs by mouth once       melatonin 5 MG sublingual tablet TAKE 1 TAB BY MOUTH AT BEDTIME       melatonin 5 MG tablet Take 5 mg by mouth       metFORMIN (GLUCOPHAGE XR) 500 MG 24 hr tablet Take 2 tablets (1,000 mg) by mouth 2 times daily (with meals) 120 tablet 3     Multiple Vitamin (DAILY-KONSTANTIN) TABS Take 1 tablet by mouth At Bedtime       Multiple Vitamins-Minerals (CERTA-KONSTANTIN PO)        MYRBETRIQ 25 MG 24 hr tablet TAKE 1 TAB BY MOUTH ONCE DAILY       nicotine polacrilex (NICORETTE) 4 MG gum        nystatin-triamcinolone (MYCOLOG) 686037-5.1 UNIT/GM-% external ointment APPLY TO AFFECTED AREA(S) ON LABIA TWICE DAILY FOR ONE WEEK FOR CANDIDA       olopatadine (PATANOL) 0.1 % ophthalmic solution INSTILL 1 DROP TO BOTH EYES TWICE A DAY  5     oxybutynin (DITROPAN-XL) 10 MG 24 hr tablet TAKE 1 TAB BY MOUTH ONCE DAILY  99     Paliperidone (INVEGA PO) Take 9 mg by mouth       paliperidone (INVEGA SUSTENNA) 234 MG/1.5ML SUSP Inject 234 mg into the muscle       PARoxetine (PAXIL) 20 MG tablet TAKE 1/2 TAB (10MG) BY MOUTH DAILY FOR 1 WEEK TAKE WITH FOOD;TAKE 1 TAB BY MOUTH DAILY TAKE WITH FOOD       polyethylene  glycol (MIRALAX) powder Take 17 g (1 capful) by mouth daily 510 g 11     simethicone (MYLICON) 125 MG chewable tablet Take 125 mg by mouth 2 times daily       SM MICONAZOLE 7 2 % cream INSERT 1 APPLICATORFUL VAGINALLY AT BEDTIME FOR 7 DAYS       SM NICOTINE POLACRILEX 2 MG gum CHEW 1 PIECE EVERY HOUR AS NEEDED FOR NICOTINE CRAVINGS  16     Specialty Vitamins Products (VITAMINS FOR HAIR) CAPS Take 1 tablet by mouth daily       topiramate (TOPAMAX) 25 MG tablet Take 3 tablets (75 mg) by mouth 2 times daily 180 tablet 3     triamcinolone (KENALOG) 0.025 % cream APPLY TWICE DAILY TO RASH ON WRIST AND UNDER ARMPITS  1     triamcinolone (KENALOG) 0.1 % external cream APPLY TOPICALLY TO BOTH HANDS TWICE DAILY AS NEEDED FOR 2 WEEKS AT A TIME       valACYclovir (VALTREX) 500 MG tablet TAKE 1 TAB BY MOUTH DAILY       VENLAFAXINE HCL PO Take 375 mg by mouth 2 times daily ER       VITAMIN D, CHOLECALCIFEROL, PO Take 2,000 Units by mouth daily          Weight Loss Medication History Reviewed With Patient 6/28/2022   Which weight loss medications are you currently taking on a regular basis?  Topamax (topiramate), Metformin   Are you having any side effects from the weight loss medication that we have prescribed you? No   If you are having side effects please describe: -     EGD 4/2022     Impression:            - Normal esophagus.                          - Normal stomach. Biopsied.                          - Duodenal mucosal lymphangiectasia.   Recommendation:        - Await pathology results.                          - Discharge patient to home (ambulatory).                          - Return to referring physician as previously                          scheduled.                                     Hospital Outpatient Visit on 04/21/2022   Component Date Value Ref Range Status     HCG Qual Urine 04/21/2022 Negative  Negative Final     Internal QC Check POCT 04/21/2022 Valid  Valid Final     POCT Kit Lot Number 04/21/2022 [id number]    Final     POCT Kit Expiration Date 04/21/2022 08/31/2023   Final     Case Report 04/21/2022    Final                    Value:Surgical Pathology Report                         Case: [id number]                                  Authorizing Provider:  Sina Dove Collected:           04/21/2022 09:47 AM                                 MD PETE                                                                        Ordering Location:     [facility] OR  Received:            04/21/2022 10:02 AM                                 [place]                                                                  Pathologist:           Eyad Damon DO                                                            Specimens:   A) - Small Intestine, Duodenum, Duodenum                                                            B) - Stomach, Body, Gastric                                                                 Final Diagnosis 04/21/2022    Final                    Value:This result contains rich text formatting which cannot be displayed here.     Clinical Information 04/21/2022    Final                    Value:This result contains rich text formatting which cannot be displayed here.     Gross Description 04/21/2022    Final                    Value:This result contains rich text formatting which cannot be displayed here.     Microscopic Description 04/21/2022    Final                    Value:This result contains rich text formatting which cannot be displayed here.     Performing Labs 04/21/2022    Final                    Value:This result contains rich text formatting which cannot be displayed here.     Upper GI Endoscopy 04/21/2022    Final                    Value:[facility]  [address] [place], [place] [address]., [place] [id number] [phone]     Endoscopy Department  _______________________________________________________________________________  Patient Name: Jonathon Broussard         Procedure Date:  4/21/2022 7:32 AM  MRN: [id number]                       Account Number: [id number]  YOB: 1980              Admit Type: Outpatient  Age: 41                               Room: [facility] PROCEDURE ROOM 03  Gender: Female                        Note Status: Finalized  Attending MD: Sina Dove MD  Pause for the Cause: completed  Total Sedation Time:                    _______________________________________________________________________________     Procedure:             Upper GI endoscopy  Indications:           Epigastric abdominal pain  Providers:             Sina Dove MD, Carolyn Galdamez, Mahogany Zhou CRNA, OSEAS CHARLES MD  Patient Profile:       This is a 41 year old female with hx of GERD came f                          or                          EGD to evaluate.  Referring MD:            Medicines:             Midazolam 2 mg IV, Fentanyl 100 micrograms IV,                          Benzocaine spray  Complications:         No immediate complications.  _______________________________________________________________________________  Procedure:             Pre-Anesthesia Assessment:                         - Prior to the procedure, a History and Physical was                          performed, and patient medications and allergies were                          reviewed. The patient is competent. The risks and                          benefits of the procedure and the sedation options and                          risks were discussed with the patient. All questions                          were answered and informed consent was obtained.                          Patient identification and proposed procedure were                          verified by the physician in the procedure room.                                                    Mental Status Examination: alert and oriented. Airway                          Examination: normal oropharyngeal airway and neck                           mobility. Respiratory Examination: clear to                          auscultation. CV Examination: normal. Prophylactic                          Antibiotics: The patient does not require prophylactic                          antibiotics. Prior Anticoagulants: The patient has                          taken no anticoagulant or antiplatelet agents. ASA                          Grade Assessment: II - A patient with mild systemic                          disease. After reviewing the risks and benefits, the                          patient was deemed in satisfactory condition to                          undergo the procedure. The anesthesia plan was to use                          moderate sedation / analgesia (conscious sedation).                          Immediately prior to administration of medications,                          the                           patient was re-assessed for adequacy to receive                          sedatives. The heart rate, respiratory rate, oxygen                          saturations, blood pressure, adequacy of pulmonary                          ventilation, and response to care were monitored                          throughout the procedure. The physical status of the                          patient was re-assessed after the procedure.                         After obtaining informed consent, the endoscope was                          passed under direct vision. Throughout the procedure,                          the patient's blood pressure, pulse, and oxygen                          saturations were monitored continuously. The Endoscope                          was introduced through the mouth, and advanced to the                          second part of duodenum. The upper GI endoscopy was                          accomplished without difficulty. The patient tolerated                          the pro                          cedure well.                                                                                    Findings:       The examined esophagus was normal.       The entire examined stomach was normal. Biopsies were taken with a cold        forceps for Helicobacter pylori testing.       Lymphangiectasia was present in the duodenal bulb and in the second        portion of the duodenum. Biopsies for histology were taken with a cold        forceps for evaluation of celiac disease.                                                                                   Moderate Sedation:       Moderate (conscious) sedation was administered by the endoscopy nurse        and supervised by the endoscopist. The patient's oxygen saturation,        heart rate, blood pressure and response to care were monitored. Total        physician intraservice time was 7 minutes.  Impression:            - Normal esophagus.                         - Normal stomach. Biopsied.                         - Duodenal mucosal lymphangi                          ectasia.  Recommendation:        - Await pathology results.                         - Discharge patient to home (ambulatory).                         - Return to referring physician as previously                          scheduled.                                                                                     electronically signed by JULIA Dove  _____________________  Sina Dove MD  4/21/2022 10:04:07 AM  I was physically present for the entire viewing portion of the exam.  __________________________  Signature of teaching physician  Sina Dove MD    ________________  OSEAS CHARLES MD  Number of Addenda: 0    Note Initiated On: 4/21/2022 7:32 AM  Scope In:  Scope Out:           PHYSICAL EXAM:  Objective    Wt 108 kg (238 lb 1.6 oz)   BMI 42.18 kg/m             Vitals:  No vitals were obtained today due to virtual visit.    GENERAL: Healthy, alert and no distress  EYES: Eyes grossly normal to inspection.  No discharge or  erythema, or obvious scleral/conjunctival abnormalities.  RESP: No audible wheeze, cough, or visible cyanosis.  No visible retractions or increased work of breathing.    SKIN: Visible skin clear. No significant rash, abnormal pigmentation or lesions.  NEURO: Cranial nerves grossly intact.  Mentation and speech appropriate for age.  PSYCH: Mentation appears normal, affect normal/bright, judgement and insight intact, normal speech and appearance well-groomed.        Sincerely,    Jessica Contreras NP

## 2022-06-28 NOTE — ASSESSMENT & PLAN NOTE
Down 14lb in the last 3 months. Doing protein shake for breakfast and lunch which she is really enjoying. Has friend doing this with her. Some cravings- mostly in the evening- wondering if she can go up to topiramate 100mg twice daily (currently taking 75mg BID). Previously at this dose with increased depression and SI. Has new psychiatrist. Would like her to talk to them before increasing. Discussed strategies to avoid snacking in the evenings.     Plan:  -continue topiramate 75mg twice daily    -let me know if your psychiatrist says it is okay to increase to 100mg twice daily   -continue metformin 1000mg twice daily   -continue protein shakes for breakfast and lunch  -continue with hydration!   -keep up the great work!   -look for someone to walk with  -ideas to do while watching TV   -arts/ crafts like coloring, knitting      -puzzles   -chewing gum   -glass of crystal light   -Follow up 3 months

## 2022-06-28 NOTE — NURSING NOTE
Chief Complaint   Patient presents with     Follow Up     Return weight management.         Vitals:    06/28/22 1719   Weight: 238 lb 1.6 oz       Body mass index is 42.18 kg/m .      Donovan Menard, EMT  [facility]

## 2022-06-28 NOTE — PROGRESS NOTES
Jonathon Broussard is a 42 year old year old who is being evaluated via a billable telephone visit.      What phone number would you like to be contacted at? [phone]  How would you like to obtain your AVS? Mail a copy    Donovan Menard NREMT

## 2022-09-28 NOTE — PROGRESS NOTES
Tele-Visit Check-In    Patient did not answer check in attempts x 3. Left voicemail x 3. Patient has not completed questionnaire and has not connected via [employer]. Provider informed.    CAMDEN KenneyT

## 2022-09-29 ENCOUNTER — VIRTUAL VISIT (OUTPATIENT)
Dept: ENDOCRINOLOGY | Facility: CLINIC | Age: 42
End: 2022-09-29
Payer: COMMERCIAL

## 2022-09-29 DIAGNOSIS — Z91.199 NO-SHOW FOR APPOINTMENT: Primary | ICD-10-CM

## 2022-09-29 PROCEDURE — 99207 PR NO CHARGE LOS: CPT | Performed by: NURSE PRACTITIONER

## 2022-09-29 NOTE — LETTER
9/29/2022       RE: Jonathon Broussard  [address]  [address]  [facility] [address]     Dear Colleague,    Thank you for referring your patient, Jonathon Broussard, to the [facility] [place] [facility]. Please see a copy of my visit note below.    Tele-Visit Check-In    Patient did not answer check in attempts x 3. Left voicemail x 3. Patient has not completed questionnaire and has not connected via [employer]. Provider informed.    Donovan Menard NREMT            Unable to reach patient by phone. Jessica Contreras NP       Again, thank you for allowing me to participate in the care of your patient.      Sincerely,    Jessica Contreras NP

## 2022-09-29 NOTE — LETTER
Date:September 30, 2022      Provider requested that no letter be sent. Do not send.       [facility]

## 2022-11-01 RX ORDER — LANSOPRAZOLE 30 MG/1
CAPSULE, DELAYED RELEASE ORAL
Qty: 90 CAPSULE | Refills: 3 | OUTPATIENT
Start: 2022-11-01

## 2023-08-08 ENCOUNTER — LAB REQUISITION (OUTPATIENT)
Dept: LAB | Facility: CLINIC | Age: 43
End: 2023-08-08
Payer: COMMERCIAL

## 2023-08-08 DIAGNOSIS — E03.9 HYPOTHYROIDISM, UNSPECIFIED: ICD-10-CM

## 2023-08-08 PROCEDURE — 84443 ASSAY THYROID STIM HORMONE: CPT | Mod: ORL | Performed by: INTERNAL MEDICINE

## 2023-08-09 LAB — TSH SERPL DL<=0.005 MIU/L-ACNC: 1.27 UIU/ML (ref 0.3–4.2)

## 2025-03-06 NOTE — PATIENT INSTRUCTIONS
Case Management Discharge Planning Note    Patient name Jacquelyn Fritz  Location /-01 MRN [id number]  : 1959 Date 3/6/2025       Current Admission Date: 3/4/2025  Current Admission Diagnosis:Lumbar compression fracture (HCC)   Patient Active Problem List    Diagnosis Date Noted Date Diagnosed    Lumbar compression fracture (HCC) 2025     Feeling lonely 10/23/2024     Primary osteoarthritis of left knee 2024     Tremor 2024     Falls 2024     Acute pain of left shoulder 2024     Dysarthria 2024     Memory difficulties 2023     Vitamin B12 deficiency 2023     Vitamin D deficiency 10/23/2023     Neuropathy 2023     Vitamin B6 deficiency 2023     Paresthesia of both lower extremities 09/15/2022     Osteopenia 09/15/2022     Alcohol use 2022     Macrocytic anemia 2022     Post-menopausal 2020     COPD, mild (HCC) 2020     Prediabetes 2020     Hypokalemia 2019     Tibial plateau fracture, left 2019     History of lung cancer 2019     Essential hypertension 2019     Mixed hyperlipidemia 2019       LOS (days): 1  Geometric Mean LOS (GMLOS) (days): 2.9  Days to GMLOS:1.7     OBJECTIVE:  Risk of Unplanned Readmission Score: 11.22         Current admission status: Inpatient   Preferred Pharmacy:   [id number] - [place] - [address]  [address]  [facility] [address]  Phone: [phone] Fax: [phone]    [facility] [place] [place] [facility] [address] [address]  Phone: [phone] Fax: [phone]    Primary Care Provider: Zander Feliciano MD    Primary Insurance: [employer]  Secondary Insurance:     DISCHARGE DETAILS:                                                                                                               Facility Insurance Auth Number: [id number]         It was a pleasure taking care of you today.  I've included a brief summary of our discussion and care plan from today's visit below.  Please review this information with your primary care provider.  ______________________________________________________________________    My recommendations are summarized as follows:    -- Recommend Miralax.  This is not a stimulant laxative and is safe to take on a daily basis.  Try 3 cap(s) full every day.  You can titrate this dose (increase or decrease) based on stool frequency and consistency. Do not stop, just decrease the dose if needed.    -- If not bowel movements in 72 hours, then try 1 bottle magnesium citrate.    -- Recommend soluble fiber supplementation on a daily basis (Metamucil, citrucel or benefiber).  Start with 1 tablespoon per day and if tolerated, may increase up to 2-3 tablespoons per day.  You may experience some bloating with initiation of fiber supplementation that will improve over the first month.  A good fiber trial to evaluate the effect is 3-6 months.    Return to GI Clinic in 3 months to review your progress.    ______________________________________________________________________    Who do I call with any questions after my visit?  Please be in touch if there are any further questions that arise following today's visit.  There are multiple ways to contact your gastroenterology care team.        During business hours, you may reach a Gastroenterology nurse at [phone], option 3.       To schedule or reschedule an appointment, please call [phone].       You can always send a secure message through [email or web address].  [email or web address] messages are answered by your nurse or doctor typically within 24 hours.  Please allow extra time on weekends and holidays.        For urgent/emergent questions after business hours, you may reach the on-call GI Fellow by contacting the [facility] at [phone].      In order for your refill to be processed in  a timely fashion, it is your responsibility to ensure you follow the recommendations from your provider regarding your laboratory studies and follow up appointments.       How will I get the results of any tests ordered?    You will receive all of your results.  If you have signed up for [employer], any tests ordered at your visit will be available to you after your physician reviews them.  Typically this takes 1-2 weeks.  If there are urgent results that require a change in your care plan, your physician or nurse will call you to discuss the next steps.      What is [email or web address]?  [email or web address] is a secure way for you to access all of your healthcare records from the [facility].  It is a web based computer program, so you can sign on to it from any location.  It also allows you to send secure messages to your care team.  I recommend signing up for [email or web address][employer]ly access if you have not already done so and are comfortable with using a computer.      How to I schedule a follow-up visit?  If you did not schedule a follow-up visit today, please call [phone] to schedule a follow-up office visit.        Sincerely,    Aubrey Holden PA-C  [facility]  Division of Gastroenterology

## (undated) DEVICE — ENDO BITE BLOCK ADULT OMNI-BLOC

## (undated) DEVICE — ENDO FORCEP BX CAPTURA PRO SPIKE G50696

## (undated) DEVICE — SYR 30ML SLIP TIP W/O NDL 302833

## (undated) DEVICE — GLOVE EXAM NITRILE LG PF LATEX FREE 5064

## (undated) DEVICE — SPECIMEN CONTAINER 3OZ W/FORMALIN 59901

## (undated) DEVICE — SOL WATER IRRIG 500ML BOTTLE 2F7113

## (undated) DEVICE — GOWN IMPERVIOUS 2XL BLUE

## (undated) DEVICE — TUBING SUCTION 12"X1/4" N612

## (undated) DEVICE — SUCTION CATH AIRLIFE TRI-FLO W/CONTROL PORT 14FR  T60C

## (undated) DEVICE — SUCTION MANIFOLD NEPTUNE 2 SYS 1 PORT 702-025-000

## (undated) DEVICE — KIT ENDO TURNOVER/PROCEDURE CARRY-ON 101822

## (undated) RX ORDER — FENTANYL CITRATE 50 UG/ML
INJECTION, SOLUTION INTRAMUSCULAR; INTRAVENOUS
Status: DISPENSED
Start: 2022-04-21

## (undated) RX ORDER — ONDANSETRON 2 MG/ML
INJECTION INTRAMUSCULAR; INTRAVENOUS
Status: DISPENSED
Start: 2022-04-21

## (undated) RX ORDER — DIPHENHYDRAMINE HYDROCHLORIDE 50 MG/ML
INJECTION INTRAMUSCULAR; INTRAVENOUS
Status: DISPENSED
Start: 2022-04-21